# Patient Record
Sex: MALE | Race: ASIAN | NOT HISPANIC OR LATINO | URBAN - METROPOLITAN AREA
[De-identification: names, ages, dates, MRNs, and addresses within clinical notes are randomized per-mention and may not be internally consistent; named-entity substitution may affect disease eponyms.]

---

## 2017-01-01 ENCOUNTER — EMERGENCY (EMERGENCY)
Facility: HOSPITAL | Age: 62
LOS: 1 days | Discharge: HOME | End: 2017-01-01
Attending: EMERGENCY MEDICINE | Admitting: EMERGENCY MEDICINE
Payer: COMMERCIAL

## 2017-01-01 ENCOUNTER — INPATIENT (INPATIENT)
Facility: HOSPITAL | Age: 62
LOS: 1 days | End: 2017-10-28
Attending: SURGERY | Admitting: SURGERY
Payer: COMMERCIAL

## 2017-01-01 ENCOUNTER — INPATIENT (INPATIENT)
Facility: HOSPITAL | Age: 62
LOS: 7 days | Discharge: ANOTHER HOSPITAL | DRG: 305 | End: 2017-10-26
Attending: INTERNAL MEDICINE | Admitting: INTERNAL MEDICINE
Payer: COMMERCIAL

## 2017-01-01 VITALS
DIASTOLIC BLOOD PRESSURE: 81 MMHG | HEART RATE: 68 BPM | RESPIRATION RATE: 16 BRPM | SYSTOLIC BLOOD PRESSURE: 126 MMHG | TEMPERATURE: 98 F | OXYGEN SATURATION: 99 %

## 2017-01-01 VITALS
DIASTOLIC BLOOD PRESSURE: 76 MMHG | RESPIRATION RATE: 15 BRPM | SYSTOLIC BLOOD PRESSURE: 114 MMHG | HEART RATE: 83 BPM | TEMPERATURE: 99 F | OXYGEN SATURATION: 99 %

## 2017-01-01 VITALS
DIASTOLIC BLOOD PRESSURE: 88 MMHG | HEART RATE: 72 BPM | WEIGHT: 102.96 LBS | RESPIRATION RATE: 16 BRPM | HEIGHT: 74 IN | SYSTOLIC BLOOD PRESSURE: 133 MMHG | OXYGEN SATURATION: 100 % | TEMPERATURE: 94 F

## 2017-01-01 VITALS
WEIGHT: 102.07 LBS | RESPIRATION RATE: 20 BRPM | TEMPERATURE: 97 F | HEIGHT: 72 IN | OXYGEN SATURATION: 100 % | DIASTOLIC BLOOD PRESSURE: 76 MMHG | SYSTOLIC BLOOD PRESSURE: 112 MMHG | HEART RATE: 101 BPM

## 2017-01-01 VITALS — DIASTOLIC BLOOD PRESSURE: 95 MMHG | SYSTOLIC BLOOD PRESSURE: 139 MMHG | TEMPERATURE: 98 F | RESPIRATION RATE: 19 BRPM

## 2017-01-01 VITALS — OXYGEN SATURATION: 59 % | RESPIRATION RATE: 8 BRPM

## 2017-01-01 DIAGNOSIS — Z29.9 ENCOUNTER FOR PROPHYLACTIC MEASURES, UNSPECIFIED: ICD-10-CM

## 2017-01-01 DIAGNOSIS — R64 CACHEXIA: ICD-10-CM

## 2017-01-01 DIAGNOSIS — E87.1 HYPO-OSMOLALITY AND HYPONATREMIA: ICD-10-CM

## 2017-01-01 DIAGNOSIS — R62.7 ADULT FAILURE TO THRIVE: ICD-10-CM

## 2017-01-01 DIAGNOSIS — J93.9 PNEUMOTHORAX, UNSPECIFIED: ICD-10-CM

## 2017-01-01 DIAGNOSIS — E83.39 OTHER DISORDERS OF PHOSPHORUS METABOLISM: ICD-10-CM

## 2017-01-01 DIAGNOSIS — R63.0 ANOREXIA: ICD-10-CM

## 2017-01-01 DIAGNOSIS — K52.9 NONINFECTIVE GASTROENTERITIS AND COLITIS, UNSPECIFIED: ICD-10-CM

## 2017-01-01 DIAGNOSIS — E87.6 HYPOKALEMIA: ICD-10-CM

## 2017-01-01 DIAGNOSIS — E87.8 OTHER DISORDERS OF ELECTROLYTE AND FLUID BALANCE, NOT ELSEWHERE CLASSIFIED: ICD-10-CM

## 2017-01-01 DIAGNOSIS — E43 UNSPECIFIED SEVERE PROTEIN-CALORIE MALNUTRITION: ICD-10-CM

## 2017-01-01 DIAGNOSIS — D72.829 ELEVATED WHITE BLOOD CELL COUNT, UNSPECIFIED: ICD-10-CM

## 2017-01-01 DIAGNOSIS — E46 UNSPECIFIED PROTEIN-CALORIE MALNUTRITION: ICD-10-CM

## 2017-01-01 DIAGNOSIS — I73.9 PERIPHERAL VASCULAR DISEASE, UNSPECIFIED: ICD-10-CM

## 2017-01-01 DIAGNOSIS — E16.2 HYPOGLYCEMIA, UNSPECIFIED: ICD-10-CM

## 2017-01-01 LAB
% ALBUMIN: 53.8 % — SIGNIFICANT CHANGE UP
% ALPHA 1: 7.7 % — SIGNIFICANT CHANGE UP
% ALPHA 2: 13.4 % — SIGNIFICANT CHANGE UP
% BETA: 11.1 % — SIGNIFICANT CHANGE UP
% GAMMA: 14 % — SIGNIFICANT CHANGE UP
24R-OH-CALCIDIOL SERPL-MCNC: 26.4 NG/ML — LOW (ref 30–80)
4/8 RATIO: 3.68 RATIO — SIGNIFICANT CHANGE UP (ref 0.9–3.6)
ABS CD8: 10 /UL — LOW (ref 136–757)
ACANTHOCYTES BLD QL SMEAR: SIGNIFICANT CHANGE UP
ACE SERPL-CCNC: 23 U/L — SIGNIFICANT CHANGE UP (ref 14–82)
ACETONE SERPL-MCNC: NEGATIVE — SIGNIFICANT CHANGE UP
ACTH SER-ACNC: 13 PG/ML — SIGNIFICANT CHANGE UP (ref 0–46)
ACTH SER-ACNC: 83 PG/ML — HIGH (ref 0–46)
ALBUMIN SERPL ELPH-MCNC: 1.9 G/DL — LOW (ref 3.3–5)
ALBUMIN SERPL ELPH-MCNC: 2 G/DL — LOW (ref 3.3–5)
ALBUMIN SERPL ELPH-MCNC: 2 G/DL — LOW (ref 3.3–5)
ALBUMIN SERPL ELPH-MCNC: 2.2 G/DL — LOW (ref 3.3–5)
ALBUMIN SERPL ELPH-MCNC: 2.7 G/DL — LOW (ref 3.6–5.5)
ALBUMIN SERPL ELPH-MCNC: 2.9 G/DL — LOW (ref 3.5–5)
ALBUMIN SERPL ELPH-MCNC: 2.9 G/DL — LOW (ref 3.5–5)
ALBUMIN SERPL ELPH-MCNC: 3 G/DL — LOW (ref 3.5–5)
ALBUMIN SERPL ELPH-MCNC: 4 G/DL — SIGNIFICANT CHANGE UP (ref 3.3–5)
ALBUMIN/GLOB SERPL ELPH: 1.2 RATIO — SIGNIFICANT CHANGE UP
ALP SERPL-CCNC: 57 U/L — SIGNIFICANT CHANGE UP (ref 40–120)
ALP SERPL-CCNC: 66 U/L — SIGNIFICANT CHANGE UP (ref 40–120)
ALP SERPL-CCNC: 71 U/L — SIGNIFICANT CHANGE UP (ref 40–120)
ALP SERPL-CCNC: 72 U/L — SIGNIFICANT CHANGE UP (ref 40–120)
ALP SERPL-CCNC: 83 U/L — SIGNIFICANT CHANGE UP (ref 40–120)
ALP SERPL-CCNC: 85 U/L — SIGNIFICANT CHANGE UP (ref 40–120)
ALP SERPL-CCNC: 89 U/L — SIGNIFICANT CHANGE UP (ref 40–120)
ALPHA1 GLOB SERPL ELPH-MCNC: 0.4 G/DL — SIGNIFICANT CHANGE UP (ref 0.1–0.4)
ALPHA2 GLOB SERPL ELPH-MCNC: 0.7 G/DL — SIGNIFICANT CHANGE UP (ref 0.5–1)
ALT FLD-CCNC: 1598 U/L — HIGH (ref 4–41)
ALT FLD-CCNC: 1832 U/L — HIGH (ref 4–41)
ALT FLD-CCNC: 25 U/L RC — SIGNIFICANT CHANGE UP (ref 10–45)
ALT FLD-CCNC: 67 U/L DA — HIGH (ref 10–60)
ALT FLD-CCNC: 687 U/L — HIGH (ref 4–41)
ALT FLD-CCNC: 70 U/L DA — HIGH (ref 10–60)
ALT FLD-CCNC: 72 U/L DA — HIGH (ref 10–60)
AMYLASE P1 CFR SERPL: 117 U/L — HIGH (ref 25–115)
ANA TITR SER: NEGATIVE — SIGNIFICANT CHANGE UP
ANION GAP SERPL CALC-SCNC: 10 MMOL/L — SIGNIFICANT CHANGE UP (ref 5–17)
ANION GAP SERPL CALC-SCNC: 12 MMOL/L — SIGNIFICANT CHANGE UP (ref 5–17)
ANION GAP SERPL CALC-SCNC: 3 MMOL/L — LOW (ref 5–17)
ANION GAP SERPL CALC-SCNC: 4 MMOL/L — LOW (ref 5–17)
ANION GAP SERPL CALC-SCNC: 5 MMOL/L — SIGNIFICANT CHANGE UP (ref 5–17)
ANION GAP SERPL CALC-SCNC: 5 MMOL/L — SIGNIFICANT CHANGE UP (ref 5–17)
ANION GAP SERPL CALC-SCNC: 6 MMOL/L — SIGNIFICANT CHANGE UP (ref 5–17)
ANION GAP SERPL CALC-SCNC: 7 MMOL/L — SIGNIFICANT CHANGE UP (ref 5–17)
ANION GAP SERPL CALC-SCNC: 7 MMOL/L — SIGNIFICANT CHANGE UP (ref 5–17)
ANION GAP SERPL CALC-SCNC: 8 MMOL/L — SIGNIFICANT CHANGE UP (ref 5–17)
ANISOCYTOSIS BLD QL: SLIGHT — SIGNIFICANT CHANGE UP
ANISOCYTOSIS BLD QL: SLIGHT — SIGNIFICANT CHANGE UP
APPEARANCE UR: CLEAR — SIGNIFICANT CHANGE UP
APTT BLD: 32.6 SEC — SIGNIFICANT CHANGE UP (ref 27.5–37.4)
APTT BLD: 38.8 SEC — HIGH (ref 27.5–37.4)
APTT BLD: 42.7 SEC — HIGH (ref 27.5–37.4)
APTT BLD: 46.3 SEC — HIGH (ref 27.5–37.4)
APTT BLD: 50.2 SEC — HIGH (ref 27.5–37.4)
AST SERPL-CCNC: 1120 U/L — HIGH (ref 4–40)
AST SERPL-CCNC: 27 U/L — SIGNIFICANT CHANGE UP (ref 10–40)
AST SERPL-CCNC: 3611 U/L — HIGH (ref 4–40)
AST SERPL-CCNC: 3668 U/L — HIGH (ref 4–40)
AST SERPL-CCNC: 75 U/L — HIGH (ref 10–40)
AST SERPL-CCNC: 78 U/L — HIGH (ref 10–40)
AST SERPL-CCNC: 79 U/L — HIGH (ref 10–40)
B-GLOBULIN SERPL ELPH-MCNC: 0.6 G/DL — SIGNIFICANT CHANGE UP (ref 0.5–1)
BASE EXCESS BLDA CALC-SCNC: -3.6 MMOL/L — SIGNIFICANT CHANGE UP
BASE EXCESS BLDA CALC-SCNC: -6.3 MMOL/L — SIGNIFICANT CHANGE UP
BASE EXCESS BLDA CALC-SCNC: -7.2 MMOL/L — SIGNIFICANT CHANGE UP
BASE EXCESS BLDA CALC-SCNC: -7.5 MMOL/L — SIGNIFICANT CHANGE UP
BASE EXCESS BLDA CALC-SCNC: -9.6 MMOL/L — SIGNIFICANT CHANGE UP
BASE EXCESS BLDV CALC-SCNC: -0.5 MMOL/L — SIGNIFICANT CHANGE UP
BASE EXCESS BLDV CALC-SCNC: 4.1 MMOL/L — HIGH (ref -2–2)
BASOPHILS # BLD AUTO: 0 K/UL — SIGNIFICANT CHANGE UP (ref 0–0.2)
BASOPHILS # BLD AUTO: 0.02 K/UL — SIGNIFICANT CHANGE UP (ref 0–0.2)
BASOPHILS # BLD AUTO: 0.02 K/UL — SIGNIFICANT CHANGE UP (ref 0–0.2)
BASOPHILS # BLD AUTO: 0.1 K/UL — SIGNIFICANT CHANGE UP (ref 0–0.2)
BASOPHILS # BLD AUTO: 0.1 K/UL — SIGNIFICANT CHANGE UP (ref 0–0.2)
BASOPHILS # BLD AUTO: 0.2 K/UL — SIGNIFICANT CHANGE UP (ref 0–0.2)
BASOPHILS NFR BLD AUTO: 0.2 % — SIGNIFICANT CHANGE UP (ref 0–2)
BASOPHILS NFR BLD AUTO: 0.2 % — SIGNIFICANT CHANGE UP (ref 0–2)
BASOPHILS NFR BLD AUTO: 0.3 % — SIGNIFICANT CHANGE UP (ref 0–2)
BASOPHILS NFR BLD AUTO: 0.4 % — SIGNIFICANT CHANGE UP (ref 0–2)
BASOPHILS NFR BLD AUTO: 0.5 % — SIGNIFICANT CHANGE UP (ref 0–2)
BASOPHILS NFR BLD AUTO: 0.5 % — SIGNIFICANT CHANGE UP (ref 0–2)
BASOPHILS NFR BLD AUTO: 0.6 % — SIGNIFICANT CHANGE UP (ref 0–2)
BASOPHILS NFR BLD AUTO: 1.5 % — SIGNIFICANT CHANGE UP (ref 0–2)
BASOPHILS NFR SPEC: 0 % — SIGNIFICANT CHANGE UP (ref 0–2)
BILIRUB DIRECT SERPL-MCNC: 0.3 MG/DL — HIGH (ref 0.1–0.2)
BILIRUB DIRECT SERPL-MCNC: 0.4 MG/DL — HIGH (ref 0.1–0.2)
BILIRUB SERPL-MCNC: 0.5 MG/DL — SIGNIFICANT CHANGE UP (ref 0.2–1.2)
BILIRUB SERPL-MCNC: 0.7 MG/DL — SIGNIFICANT CHANGE UP (ref 0.2–1.2)
BILIRUB SERPL-MCNC: 0.8 MG/DL — SIGNIFICANT CHANGE UP (ref 0.2–1.2)
BILIRUB SERPL-MCNC: 0.8 MG/DL — SIGNIFICANT CHANGE UP (ref 0.2–1.2)
BILIRUB SERPL-MCNC: 0.9 MG/DL — SIGNIFICANT CHANGE UP (ref 0.2–1.2)
BILIRUB SERPL-MCNC: 0.9 MG/DL — SIGNIFICANT CHANGE UP (ref 0.2–1.2)
BILIRUB SERPL-MCNC: 1 MG/DL — SIGNIFICANT CHANGE UP (ref 0.2–1.2)
BILIRUB UR-MCNC: NEGATIVE — SIGNIFICANT CHANGE UP
BLD GP AB SCN SERPL QL: NEGATIVE — SIGNIFICANT CHANGE UP
BLD GP AB SCN SERPL QL: NEGATIVE — SIGNIFICANT CHANGE UP
BLOOD GAS VENOUS - CREATININE: 0.38 MG/DL — LOW (ref 0.5–1.3)
BLOOD UR QL VISUAL: NEGATIVE — SIGNIFICANT CHANGE UP
BUN SERPL-MCNC: 10 MG/DL — SIGNIFICANT CHANGE UP (ref 7–18)
BUN SERPL-MCNC: 10 MG/DL — SIGNIFICANT CHANGE UP (ref 7–18)
BUN SERPL-MCNC: 11 MG/DL — SIGNIFICANT CHANGE UP (ref 7–18)
BUN SERPL-MCNC: 11 MG/DL — SIGNIFICANT CHANGE UP (ref 7–18)
BUN SERPL-MCNC: 13 MG/DL — SIGNIFICANT CHANGE UP (ref 7–18)
BUN SERPL-MCNC: 15 MG/DL — SIGNIFICANT CHANGE UP (ref 7–18)
BUN SERPL-MCNC: 17 MG/DL — SIGNIFICANT CHANGE UP (ref 7–23)
BUN SERPL-MCNC: 19 MG/DL — HIGH (ref 7–18)
BUN SERPL-MCNC: 19 MG/DL — HIGH (ref 7–18)
BUN SERPL-MCNC: 20 MG/DL — HIGH (ref 7–18)
BUN SERPL-MCNC: 20 MG/DL — HIGH (ref 7–18)
BUN SERPL-MCNC: 32 MG/DL — HIGH (ref 7–18)
BUN SERPL-MCNC: 42 MG/DL — HIGH (ref 7–23)
BUN SERPL-MCNC: 43 MG/DL — HIGH (ref 7–23)
BUN SERPL-MCNC: 44 MG/DL — HIGH (ref 7–23)
BUN SERPL-MCNC: 7 MG/DL — SIGNIFICANT CHANGE UP (ref 7–18)
BUN SERPL-MCNC: 8 MG/DL — SIGNIFICANT CHANGE UP (ref 7–18)
BUN SERPL-MCNC: 9 MG/DL — SIGNIFICANT CHANGE UP (ref 7–18)
C DIFF BY PCR RESULT: SIGNIFICANT CHANGE UP
C DIFF TOX GENS STL QL NAA+PROBE: SIGNIFICANT CHANGE UP
C PEPTIDE SERPL-MCNC: 0.6 NG/ML — LOW (ref 0.9–7.1)
C PEPTIDE SERPL-MCNC: 1 NG/ML — SIGNIFICANT CHANGE UP (ref 0.9–7.1)
CA-I BLDA-SCNC: 0.67 MMOL/L — CRITICAL LOW (ref 1.15–1.29)
CA-I BLDA-SCNC: 0.95 MMOL/L — LOW (ref 1.15–1.29)
CA-I BLDA-SCNC: 1.13 MMOL/L — LOW (ref 1.15–1.29)
CA-I BLDA-SCNC: 1.19 MMOL/L — SIGNIFICANT CHANGE UP (ref 1.15–1.29)
CA-I BLDA-SCNC: 1.2 MMOL/L — SIGNIFICANT CHANGE UP (ref 1.15–1.29)
CA-I SERPL-SCNC: 1.26 MMOL/L — SIGNIFICANT CHANGE UP (ref 1.12–1.3)
CALCIUM SERPL-MCNC: 7.1 MG/DL — LOW (ref 8.4–10.5)
CALCIUM SERPL-MCNC: 7.2 MG/DL — LOW (ref 8.4–10.5)
CALCIUM SERPL-MCNC: 7.3 MG/DL — LOW (ref 8.4–10.5)
CALCIUM SERPL-MCNC: 7.4 MG/DL — LOW (ref 8.4–10.5)
CALCIUM SERPL-MCNC: 7.5 MG/DL — LOW (ref 8.4–10.5)
CALCIUM SERPL-MCNC: 7.6 MG/DL — LOW (ref 8.4–10.5)
CALCIUM SERPL-MCNC: 7.7 MG/DL — LOW (ref 8.4–10.5)
CALCIUM SERPL-MCNC: 7.7 MG/DL — LOW (ref 8.4–10.5)
CALCIUM SERPL-MCNC: 7.8 MG/DL — LOW (ref 8.4–10.5)
CALCIUM SERPL-MCNC: 7.9 MG/DL — LOW (ref 8.4–10.5)
CALCIUM SERPL-MCNC: 8 MG/DL — LOW (ref 8.4–10.5)
CALCIUM SERPL-MCNC: 8 MG/DL — LOW (ref 8.4–10.5)
CALCIUM SERPL-MCNC: 8.2 MG/DL — LOW (ref 8.4–10.5)
CALCIUM SERPL-MCNC: 8.4 MG/DL — SIGNIFICANT CHANGE UP (ref 8.4–10.5)
CALCIUM SERPL-MCNC: 9.5 MG/DL — SIGNIFICANT CHANGE UP (ref 8.4–10.5)
CD3 BLASTS SPEC-ACNC: 31 % — LOW (ref 59–85)
CD3 BLASTS SPEC-ACNC: 45 /UL — LOW (ref 799–2171)
CD4 %: 24 % — LOW (ref 36–65)
CD8 %: 7 % — LOW (ref 11–36)
CHLORIDE BLDV-SCNC: 95 MMOL/L — LOW (ref 96–108)
CHLORIDE BLDV-SCNC: 97 MMOL/L — SIGNIFICANT CHANGE UP (ref 96–108)
CHLORIDE SERPL-SCNC: 101 MMOL/L — SIGNIFICANT CHANGE UP (ref 96–108)
CHLORIDE SERPL-SCNC: 81 MMOL/L — LOW (ref 96–108)
CHLORIDE SERPL-SCNC: 94 MMOL/L — LOW (ref 96–108)
CHLORIDE SERPL-SCNC: 94 MMOL/L — LOW (ref 96–108)
CHLORIDE SERPL-SCNC: 94 MMOL/L — LOW (ref 98–107)
CHLORIDE SERPL-SCNC: 95 MMOL/L — LOW (ref 96–108)
CHLORIDE SERPL-SCNC: 95 MMOL/L — LOW (ref 96–108)
CHLORIDE SERPL-SCNC: 96 MMOL/L — LOW (ref 98–107)
CHLORIDE SERPL-SCNC: 96 MMOL/L — SIGNIFICANT CHANGE UP (ref 96–108)
CHLORIDE SERPL-SCNC: 97 MMOL/L — SIGNIFICANT CHANGE UP (ref 96–108)
CHLORIDE SERPL-SCNC: 98 MMOL/L — SIGNIFICANT CHANGE UP (ref 96–108)
CHLORIDE SERPL-SCNC: 98 MMOL/L — SIGNIFICANT CHANGE UP (ref 98–107)
CHLORIDE SERPL-SCNC: 99 MMOL/L — SIGNIFICANT CHANGE UP (ref 96–108)
CHLORIDE SERPL-SCNC: 99 MMOL/L — SIGNIFICANT CHANGE UP (ref 96–108)
CHLORIDE UR-SCNC: 56 MMOL/L — SIGNIFICANT CHANGE UP (ref 55–125)
CHLORIDE UR-SCNC: 91 MMOL/L — SIGNIFICANT CHANGE UP (ref 55–125)
CHOLEST SERPL-MCNC: 83 MG/DL — SIGNIFICANT CHANGE UP (ref 10–199)
CK MB BLD-MCNC: 6.87 NG/ML — HIGH (ref 1–6.6)
CK SERPL-CCNC: 135 U/L — SIGNIFICANT CHANGE UP (ref 30–200)
CO2 BLDV-SCNC: 33 MMOL/L — HIGH (ref 22–30)
CO2 SERPL-SCNC: 15 MMOL/L — LOW (ref 22–31)
CO2 SERPL-SCNC: 18 MMOL/L — LOW (ref 22–31)
CO2 SERPL-SCNC: 25 MMOL/L — SIGNIFICANT CHANGE UP (ref 22–31)
CO2 SERPL-SCNC: 25 MMOL/L — SIGNIFICANT CHANGE UP (ref 22–31)
CO2 SERPL-SCNC: 27 MMOL/L — SIGNIFICANT CHANGE UP (ref 22–31)
CO2 SERPL-SCNC: 27 MMOL/L — SIGNIFICANT CHANGE UP (ref 22–31)
CO2 SERPL-SCNC: 29 MMOL/L — SIGNIFICANT CHANGE UP (ref 22–31)
CO2 SERPL-SCNC: 29 MMOL/L — SIGNIFICANT CHANGE UP (ref 22–31)
CO2 SERPL-SCNC: 30 MMOL/L — SIGNIFICANT CHANGE UP (ref 22–31)
CO2 SERPL-SCNC: 31 MMOL/L — SIGNIFICANT CHANGE UP (ref 22–31)
CO2 SERPL-SCNC: 32 MMOL/L — HIGH (ref 22–31)
CO2 SERPL-SCNC: 33 MMOL/L — HIGH (ref 22–31)
CO2 SERPL-SCNC: 33 MMOL/L — HIGH (ref 22–31)
CO2 SERPL-SCNC: 34 MMOL/L — HIGH (ref 22–31)
CO2 SERPL-SCNC: 34 MMOL/L — HIGH (ref 22–31)
COD CRY URNS QL: SIGNIFICANT CHANGE UP (ref 0–0)
COLOR SPEC: YELLOW — SIGNIFICANT CHANGE UP
CORTIS AM PEAK SERPL-MCNC: 24.4 UG/DL — HIGH (ref 6–18.4)
CORTIS AM PEAK SERPL-MCNC: 24.7 UG/DL — HIGH (ref 6–18.4)
CORTIS AM PEAK SERPL-MCNC: 25.4 UG/DL — HIGH (ref 6–18.4)
CORTIS AM PEAK SERPL-MCNC: 62.9 UG/DL — HIGH (ref 6–18.4)
CREAT ?TM UR-MCNC: <13 MG/DL — SIGNIFICANT CHANGE UP
CREAT ?TM UR-MCNC: <13 MG/DL — SIGNIFICANT CHANGE UP
CREAT SERPL-MCNC: 0.23 MG/DL — LOW (ref 0.5–1.3)
CREAT SERPL-MCNC: 0.23 MG/DL — LOW (ref 0.5–1.3)
CREAT SERPL-MCNC: 0.25 MG/DL — LOW (ref 0.5–1.3)
CREAT SERPL-MCNC: 0.26 MG/DL — LOW (ref 0.5–1.3)
CREAT SERPL-MCNC: 0.27 MG/DL — LOW (ref 0.5–1.3)
CREAT SERPL-MCNC: 0.29 MG/DL — LOW (ref 0.5–1.3)
CREAT SERPL-MCNC: 0.29 MG/DL — LOW (ref 0.5–1.3)
CREAT SERPL-MCNC: 0.3 MG/DL — LOW (ref 0.5–1.3)
CREAT SERPL-MCNC: 0.33 MG/DL — LOW (ref 0.5–1.3)
CREAT SERPL-MCNC: 0.37 MG/DL — LOW (ref 0.5–1.3)
CREAT SERPL-MCNC: 0.39 MG/DL — LOW (ref 0.5–1.3)
CREAT SERPL-MCNC: 0.4 MG/DL — LOW (ref 0.5–1.3)
CREAT SERPL-MCNC: 0.42 MG/DL — LOW (ref 0.5–1.3)
CREAT SERPL-MCNC: 0.48 MG/DL — LOW (ref 0.5–1.3)
CREAT SERPL-MCNC: 0.55 MG/DL — SIGNIFICANT CHANGE UP (ref 0.5–1.3)
CREAT SERPL-MCNC: 0.8 MG/DL — SIGNIFICANT CHANGE UP (ref 0.5–1.3)
CREAT SERPL-MCNC: <0.2 MG/DL — LOW (ref 0.5–1.3)
CREAT SERPL-MCNC: <0.2 MG/DL — LOW (ref 0.5–1.3)
CRP SERPL-MCNC: <0.2 MG/DL — SIGNIFICANT CHANGE UP (ref 0–0.4)
CULTURE - ACID FAST SMEAR CONCENTRATED: SIGNIFICANT CHANGE UP
CULTURE RESULTS: SIGNIFICANT CHANGE UP
D DIMER BLD IA.RAPID-MCNC: 866 NG/ML DDU — HIGH
DIFF PNL FLD: ABNORMAL
DIFF PNL FLD: NEGATIVE — SIGNIFICANT CHANGE UP
ELLIPTOCYTES BLD QL SMEAR: SLIGHT — SIGNIFICANT CHANGE UP
EOSINOPHIL # BLD AUTO: 0 K/UL — SIGNIFICANT CHANGE UP (ref 0–0.5)
EOSINOPHIL NFR BLD AUTO: 0 % — SIGNIFICANT CHANGE UP (ref 0–6)
EOSINOPHIL NFR BLD AUTO: 0.1 % — SIGNIFICANT CHANGE UP (ref 0–6)
EOSINOPHIL NFR BLD AUTO: 0.1 % — SIGNIFICANT CHANGE UP (ref 0–6)
EOSINOPHIL NFR BLD AUTO: 0.2 % — SIGNIFICANT CHANGE UP (ref 0–6)
EOSINOPHIL NFR FLD: 0 % — SIGNIFICANT CHANGE UP (ref 0–6)
ERYTHROCYTE [SEDIMENTATION RATE] IN BLOOD: 6 MM/HR — SIGNIFICANT CHANGE UP (ref 0–20)
FERRITIN SERPL-MCNC: 2385 NG/ML — HIGH (ref 30–400)
FIBRINOGEN PPP-MCNC: 866 MG/DL — HIGH (ref 310–510)
FOLATE SERPL-MCNC: 12.3 NG/ML — SIGNIFICANT CHANGE UP (ref 4.8–24.2)
FOLATE SERPL-MCNC: 15.2 NG/ML — SIGNIFICANT CHANGE UP (ref 4.8–24.2)
FOLATE SERPL-MCNC: > 20 NG/ML — HIGH (ref 4.7–20)
GAMMA GLOBULIN: 0.7 G/DL — SIGNIFICANT CHANGE UP (ref 0.6–1.6)
GAS PNL BLDV: 125 MMOL/L — LOW (ref 136–146)
GAS PNL BLDV: 135 MMOL/L — LOW (ref 136–145)
GAS PNL BLDV: SIGNIFICANT CHANGE UP
GAS PNL BLDV: SIGNIFICANT CHANGE UP
GIANT PLATELETS BLD QL SMEAR: PRESENT — SIGNIFICANT CHANGE UP
GLUCOSE BLDA-MCNC: 104 MG/DL — HIGH (ref 70–99)
GLUCOSE BLDA-MCNC: 108 MG/DL — HIGH (ref 70–99)
GLUCOSE BLDA-MCNC: 126 MG/DL — HIGH (ref 70–99)
GLUCOSE BLDA-MCNC: 60 MG/DL — LOW (ref 70–99)
GLUCOSE BLDA-MCNC: 80 MG/DL — SIGNIFICANT CHANGE UP (ref 70–99)
GLUCOSE BLDC GLUCOMTR-MCNC: 102 MG/DL — HIGH (ref 70–99)
GLUCOSE BLDC GLUCOMTR-MCNC: 104 MG/DL — HIGH (ref 70–99)
GLUCOSE BLDC GLUCOMTR-MCNC: 106 MG/DL — HIGH (ref 70–99)
GLUCOSE BLDC GLUCOMTR-MCNC: 114 MG/DL — HIGH (ref 70–99)
GLUCOSE BLDC GLUCOMTR-MCNC: 118 MG/DL — HIGH (ref 70–99)
GLUCOSE BLDC GLUCOMTR-MCNC: 123 MG/DL — HIGH (ref 70–99)
GLUCOSE BLDC GLUCOMTR-MCNC: 125 MG/DL — HIGH (ref 70–99)
GLUCOSE BLDC GLUCOMTR-MCNC: 126 MG/DL — HIGH (ref 70–99)
GLUCOSE BLDC GLUCOMTR-MCNC: 129 MG/DL — HIGH (ref 70–99)
GLUCOSE BLDC GLUCOMTR-MCNC: 130 MG/DL — HIGH (ref 70–99)
GLUCOSE BLDC GLUCOMTR-MCNC: 135 MG/DL — HIGH (ref 70–99)
GLUCOSE BLDC GLUCOMTR-MCNC: 148 MG/DL — HIGH (ref 70–99)
GLUCOSE BLDC GLUCOMTR-MCNC: 168 MG/DL — HIGH (ref 70–99)
GLUCOSE BLDC GLUCOMTR-MCNC: 193 MG/DL — HIGH (ref 70–99)
GLUCOSE BLDC GLUCOMTR-MCNC: 202 MG/DL — HIGH (ref 70–99)
GLUCOSE BLDC GLUCOMTR-MCNC: 207 MG/DL — HIGH (ref 70–99)
GLUCOSE BLDC GLUCOMTR-MCNC: 233 MG/DL — HIGH (ref 70–99)
GLUCOSE BLDC GLUCOMTR-MCNC: 332 MG/DL — HIGH (ref 70–99)
GLUCOSE BLDC GLUCOMTR-MCNC: 342 MG/DL — HIGH (ref 70–99)
GLUCOSE BLDC GLUCOMTR-MCNC: 42 MG/DL — CRITICAL LOW (ref 70–99)
GLUCOSE BLDC GLUCOMTR-MCNC: 45 MG/DL — CRITICAL LOW (ref 70–99)
GLUCOSE BLDC GLUCOMTR-MCNC: 56 MG/DL — LOW (ref 70–99)
GLUCOSE BLDC GLUCOMTR-MCNC: 58 MG/DL — LOW (ref 70–99)
GLUCOSE BLDC GLUCOMTR-MCNC: 62 MG/DL — LOW (ref 70–99)
GLUCOSE BLDC GLUCOMTR-MCNC: 73 MG/DL — SIGNIFICANT CHANGE UP (ref 70–99)
GLUCOSE BLDC GLUCOMTR-MCNC: 79 MG/DL — SIGNIFICANT CHANGE UP (ref 70–99)
GLUCOSE BLDC GLUCOMTR-MCNC: 81 MG/DL — SIGNIFICANT CHANGE UP (ref 70–99)
GLUCOSE BLDC GLUCOMTR-MCNC: 85 MG/DL — SIGNIFICANT CHANGE UP (ref 70–99)
GLUCOSE BLDC GLUCOMTR-MCNC: 86 MG/DL — SIGNIFICANT CHANGE UP (ref 70–99)
GLUCOSE BLDC GLUCOMTR-MCNC: 99 MG/DL — SIGNIFICANT CHANGE UP (ref 70–99)
GLUCOSE BLDC GLUCOMTR-MCNC: >600 MG/DL — CRITICAL HIGH (ref 70–99)
GLUCOSE BLDV-MCNC: 132 — HIGH (ref 70–99)
GLUCOSE BLDV-MCNC: 83 MG/DL — SIGNIFICANT CHANGE UP (ref 70–99)
GLUCOSE SERPL-MCNC: 108 MG/DL — HIGH (ref 70–99)
GLUCOSE SERPL-MCNC: 110 MG/DL — HIGH (ref 70–99)
GLUCOSE SERPL-MCNC: 139 MG/DL — HIGH (ref 70–99)
GLUCOSE SERPL-MCNC: 151 MG/DL — HIGH (ref 70–99)
GLUCOSE SERPL-MCNC: 166 MG/DL — HIGH (ref 70–99)
GLUCOSE SERPL-MCNC: 23 MG/DL — CRITICAL LOW (ref 70–99)
GLUCOSE SERPL-MCNC: 53 MG/DL — LOW (ref 70–99)
GLUCOSE SERPL-MCNC: 54 MG/DL — LOW (ref 70–99)
GLUCOSE SERPL-MCNC: 54 MG/DL — LOW (ref 70–99)
GLUCOSE SERPL-MCNC: 61 MG/DL — LOW (ref 70–99)
GLUCOSE SERPL-MCNC: 62 MG/DL — LOW (ref 70–99)
GLUCOSE SERPL-MCNC: 78 MG/DL — SIGNIFICANT CHANGE UP (ref 70–99)
GLUCOSE SERPL-MCNC: 82 MG/DL — SIGNIFICANT CHANGE UP (ref 70–99)
GLUCOSE SERPL-MCNC: 83 MG/DL — SIGNIFICANT CHANGE UP (ref 70–99)
GLUCOSE SERPL-MCNC: 84 MG/DL — SIGNIFICANT CHANGE UP (ref 70–99)
GLUCOSE SERPL-MCNC: 85 MG/DL — SIGNIFICANT CHANGE UP (ref 70–99)
GLUCOSE SERPL-MCNC: 87 MG/DL — SIGNIFICANT CHANGE UP (ref 70–99)
GLUCOSE SERPL-MCNC: 90 MG/DL — SIGNIFICANT CHANGE UP (ref 70–99)
GLUCOSE SERPL-MCNC: 93 MG/DL — SIGNIFICANT CHANGE UP (ref 70–99)
GLUCOSE SERPL-MCNC: 95 MG/DL — SIGNIFICANT CHANGE UP (ref 70–99)
GLUCOSE UR QL: NEGATIVE — SIGNIFICANT CHANGE UP
GLUCOSE UR QL: NEGATIVE — SIGNIFICANT CHANGE UP
GLUCOSE UR-MCNC: 250 — SIGNIFICANT CHANGE UP
GRAM STN WND: SIGNIFICANT CHANGE UP
H PYLORI AB SER-ACNC: 12.9 UNITS — SIGNIFICANT CHANGE UP
H PYLORI AG STL QL: NEGATIVE — SIGNIFICANT CHANGE UP
HBA1C BLD-MCNC: 5.7 % — HIGH (ref 4–5.6)
HBA1C BLD-MCNC: 5.8 % — HIGH (ref 4–5.6)
HCO3 BLDA-SCNC: 17 MMOL/L — LOW (ref 22–26)
HCO3 BLDA-SCNC: 18 MMOL/L — LOW (ref 22–26)
HCO3 BLDA-SCNC: 20 MMOL/L — LOW (ref 22–26)
HCO3 BLDV-SCNC: 24 MMOL/L — SIGNIFICANT CHANGE UP (ref 20–27)
HCO3 BLDV-SCNC: 31 MMOL/L — HIGH (ref 21–29)
HCT VFR BLD CALC: 21.1 % — LOW (ref 39–50)
HCT VFR BLD CALC: 22.2 % — LOW (ref 39–50)
HCT VFR BLD CALC: 25.8 % — LOW (ref 39–50)
HCT VFR BLD CALC: 34 % — LOW (ref 39–50)
HCT VFR BLD CALC: 34.1 % — LOW (ref 39–50)
HCT VFR BLD CALC: 35.3 % — LOW (ref 39–50)
HCT VFR BLD CALC: 36.4 % — LOW (ref 39–50)
HCT VFR BLD CALC: 36.5 % — LOW (ref 39–50)
HCT VFR BLD CALC: 38.4 % — LOW (ref 39–50)
HCT VFR BLD CALC: 38.5 % — LOW (ref 39–50)
HCT VFR BLD CALC: 38.5 % — LOW (ref 39–50)
HCT VFR BLD CALC: 42.6 % — SIGNIFICANT CHANGE UP (ref 39–50)
HCT VFR BLDA CALC: 23.3 % — LOW (ref 39–51)
HCT VFR BLDA CALC: 27.3 % — LOW (ref 39–51)
HCT VFR BLDA CALC: 29.2 % — LOW (ref 39–51)
HCT VFR BLDA CALC: 34.4 % — LOW (ref 39–51)
HCT VFR BLDA CALC: 36.5 % — LOW (ref 39–51)
HCT VFR BLDA CALC: 43 % — SIGNIFICANT CHANGE UP (ref 39–50)
HCT VFR BLDV CALC: 26.6 % — LOW (ref 39–51)
HDLC SERPL-MCNC: 75 MG/DL — SIGNIFICANT CHANGE UP (ref 40–125)
HGB BLD CALC-MCNC: 13.9 G/DL — SIGNIFICANT CHANGE UP (ref 13–17)
HGB BLD-MCNC: 11.7 G/DL — LOW (ref 13–17)
HGB BLD-MCNC: 11.8 G/DL — LOW (ref 13–17)
HGB BLD-MCNC: 11.9 G/DL — LOW (ref 13–17)
HGB BLD-MCNC: 12.2 G/DL — LOW (ref 13–17)
HGB BLD-MCNC: 12.9 G/DL — LOW (ref 13–17)
HGB BLD-MCNC: 13 G/DL — SIGNIFICANT CHANGE UP (ref 13–17)
HGB BLD-MCNC: 14 G/DL — SIGNIFICANT CHANGE UP (ref 13–17)
HGB BLD-MCNC: 7 G/DL — CRITICAL LOW (ref 13–17)
HGB BLD-MCNC: 7.7 G/DL — LOW (ref 13–17)
HGB BLD-MCNC: 8.7 G/DL — LOW (ref 13–17)
HGB BLDA-MCNC: 11.2 G/DL — LOW (ref 13–17)
HGB BLDA-MCNC: 11.9 G/DL — LOW (ref 13–17)
HGB BLDA-MCNC: 7.5 G/DL — LOW (ref 13–17)
HGB BLDA-MCNC: 8.8 G/DL — LOW (ref 13–17)
HGB BLDA-MCNC: 9.4 G/DL — LOW (ref 13–17)
HGB BLDV-MCNC: 8.6 G/DL — LOW (ref 13–17)
HIV 1 & 2 AB SERPL IA.RAPID: SIGNIFICANT CHANGE UP
HYALINE CASTS # UR AUTO: SIGNIFICANT CHANGE UP (ref 0–?)
IGA FLD-MCNC: 166 MG/DL — SIGNIFICANT CHANGE UP (ref 68–378)
IGA FLD-MCNC: 177 MG/DL — SIGNIFICANT CHANGE UP (ref 68–378)
IGG FLD-MCNC: 643 MG/DL — LOW (ref 694–1618)
IGG FLD-MCNC: 653 MG/DL — LOW (ref 694–1618)
IGM SERPL-MCNC: 164 MG/DL — SIGNIFICANT CHANGE UP (ref 40–230)
IGM SERPL-MCNC: 170 MG/DL — SIGNIFICANT CHANGE UP (ref 40–230)
IMM GRANULOCYTES # BLD AUTO: 0.03 # — SIGNIFICANT CHANGE UP
IMM GRANULOCYTES # BLD AUTO: 0.04 # — SIGNIFICANT CHANGE UP
IMM GRANULOCYTES NFR BLD AUTO: 0.4 % — SIGNIFICANT CHANGE UP (ref 0–1.5)
IMM GRANULOCYTES NFR BLD AUTO: 0.5 % — SIGNIFICANT CHANGE UP (ref 0–1.5)
INR BLD: 1.37 RATIO — HIGH (ref 0.88–1.16)
INR BLD: 1.42 RATIO — HIGH (ref 0.88–1.16)
INR BLD: 1.52 RATIO — HIGH (ref 0.88–1.16)
INR BLD: 1.75 — HIGH (ref 0.88–1.17)
INR BLD: 2.43 — HIGH (ref 0.88–1.17)
INR BLD: 3.57 — HIGH (ref 0.88–1.17)
INSULIN SERPL-MCNC: 1.8 UU/ML — LOW (ref 3–17)
INTERPRETATION SERPL IFE-IMP: SIGNIFICANT CHANGE UP
IRON SATN MFR SERPL: 175 UG/DL — HIGH (ref 65–170)
IRON SATN MFR SERPL: 88 % — HIGH (ref 20–55)
KAPPA LC SER QL IFE: 0.78 MG/DL — SIGNIFICANT CHANGE UP (ref 0.33–1.94)
KAPPA LC SER QL IFE: 0.98 MG/DL — SIGNIFICANT CHANGE UP (ref 0.33–1.94)
KAPPA/LAMBDA FREE LIGHT CHAIN RATIO, SERUM: 0.63 RATIO — SIGNIFICANT CHANGE UP (ref 0.26–1.65)
KAPPA/LAMBDA FREE LIGHT CHAIN RATIO, SERUM: 0.71 RATIO — SIGNIFICANT CHANGE UP (ref 0.26–1.65)
KETONES UR-MCNC: NEGATIVE — SIGNIFICANT CHANGE UP
LACTATE BLDA-SCNC: 6.4 MMOL/L — CRITICAL HIGH (ref 0.5–2)
LACTATE BLDA-SCNC: 8.3 MMOL/L — CRITICAL HIGH (ref 0.5–2)
LACTATE BLDA-SCNC: 8.6 MMOL/L — CRITICAL HIGH (ref 0.5–2)
LACTATE BLDV-MCNC: 1.4 MMOL/L — SIGNIFICANT CHANGE UP (ref 0.7–2)
LACTATE BLDV-MCNC: 4 MMOL/L — CRITICAL HIGH (ref 0.5–2)
LACTATE SERPL-SCNC: 0.7 MMOL/L — SIGNIFICANT CHANGE UP (ref 0.7–2)
LACTATE SERPL-SCNC: 1.1 MMOL/L — SIGNIFICANT CHANGE UP (ref 0.7–2)
LACTATE SERPL-SCNC: 4.9 MMOL/L — CRITICAL HIGH (ref 0.5–2)
LAMBDA LC SER QL IFE: 1.23 MG/DL — SIGNIFICANT CHANGE UP (ref 0.57–2.63)
LAMBDA LC SER QL IFE: 1.39 MG/DL — SIGNIFICANT CHANGE UP (ref 0.57–2.63)
LDH SERPL L TO P-CCNC: 332 U/L — HIGH (ref 120–225)
LEUKOCYTE ESTERASE UR-ACNC: NEGATIVE — SIGNIFICANT CHANGE UP
LIDOCAIN IGE QN: 377 U/L — SIGNIFICANT CHANGE UP (ref 73–393)
LIDOCAIN IGE QN: 638 U/L — HIGH (ref 73–393)
LIDOCAIN IGE QN: 66 U/L — HIGH (ref 7–60)
LIPID PNL WITH DIRECT LDL SERPL: >5 MG/DL — SIGNIFICANT CHANGE UP
LYMPHOCYTES # BLD AUTO: 0.12 K/UL — LOW (ref 1–3.3)
LYMPHOCYTES # BLD AUTO: 0.2 K/UL — LOW (ref 1–3.3)
LYMPHOCYTES # BLD AUTO: 1.1 K/UL — SIGNIFICANT CHANGE UP (ref 1–3.3)
LYMPHOCYTES # BLD AUTO: 1.2 K/UL — SIGNIFICANT CHANGE UP (ref 1–3.3)
LYMPHOCYTES # BLD AUTO: 1.2 K/UL — SIGNIFICANT CHANGE UP (ref 1–3.3)
LYMPHOCYTES # BLD AUTO: 1.3 K/UL — SIGNIFICANT CHANGE UP (ref 1–3.3)
LYMPHOCYTES # BLD AUTO: 1.4 % — LOW (ref 13–44)
LYMPHOCYTES # BLD AUTO: 1.4 K/UL — SIGNIFICANT CHANGE UP (ref 1–3.3)
LYMPHOCYTES # BLD AUTO: 1.8 K/UL — SIGNIFICANT CHANGE UP (ref 1–3.3)
LYMPHOCYTES # BLD AUTO: 10.6 % — LOW (ref 13–44)
LYMPHOCYTES # BLD AUTO: 12 % — LOW (ref 13–44)
LYMPHOCYTES # BLD AUTO: 12.4 % — LOW (ref 13–44)
LYMPHOCYTES # BLD AUTO: 12.7 % — LOW (ref 13–44)
LYMPHOCYTES # BLD AUTO: 2.8 % — LOW (ref 13–44)
LYMPHOCYTES # BLD AUTO: 8.1 % — LOW (ref 13–44)
LYMPHOCYTES # BLD AUTO: 8.1 % — LOW (ref 13–44)
LYMPHOCYTES NFR SPEC AUTO: 1.7 % — LOW (ref 13–44)
M TB TUBERC IFN-G BLD QL: -0.02 IU/ML — SIGNIFICANT CHANGE UP
M TB TUBERC IFN-G BLD QL: 0.04 IU/ML — SIGNIFICANT CHANGE UP
M TB TUBERC IFN-G BLD QL: ABNORMAL
MACROCYTES BLD QL: SLIGHT — SIGNIFICANT CHANGE UP
MAGNESIUM SERPL-MCNC: 1.7 MG/DL — SIGNIFICANT CHANGE UP (ref 1.6–2.6)
MAGNESIUM SERPL-MCNC: 1.7 MG/DL — SIGNIFICANT CHANGE UP (ref 1.6–2.6)
MAGNESIUM SERPL-MCNC: 1.8 MG/DL — SIGNIFICANT CHANGE UP (ref 1.6–2.6)
MAGNESIUM SERPL-MCNC: 1.9 MG/DL — SIGNIFICANT CHANGE UP (ref 1.6–2.6)
MAGNESIUM SERPL-MCNC: 1.9 MG/DL — SIGNIFICANT CHANGE UP (ref 1.6–2.6)
MAGNESIUM SERPL-MCNC: 2 MG/DL — SIGNIFICANT CHANGE UP (ref 1.6–2.6)
MAGNESIUM SERPL-MCNC: 2 MG/DL — SIGNIFICANT CHANGE UP (ref 1.6–2.6)
MANUAL SMEAR VERIFICATION: SIGNIFICANT CHANGE UP
MANUAL SMEAR VERIFICATION: SIGNIFICANT CHANGE UP
MCHC RBC-ENTMCNC: 30.3 PG — SIGNIFICANT CHANGE UP (ref 27–34)
MCHC RBC-ENTMCNC: 30.3 PG — SIGNIFICANT CHANGE UP (ref 27–34)
MCHC RBC-ENTMCNC: 30.6 PG — SIGNIFICANT CHANGE UP (ref 27–34)
MCHC RBC-ENTMCNC: 30.9 PG — SIGNIFICANT CHANGE UP (ref 27–34)
MCHC RBC-ENTMCNC: 30.9 PG — SIGNIFICANT CHANGE UP (ref 27–34)
MCHC RBC-ENTMCNC: 31.4 PG — SIGNIFICANT CHANGE UP (ref 27–34)
MCHC RBC-ENTMCNC: 31.6 PG — SIGNIFICANT CHANGE UP (ref 27–34)
MCHC RBC-ENTMCNC: 31.9 GM/DL — LOW (ref 32–36)
MCHC RBC-ENTMCNC: 31.9 PG — SIGNIFICANT CHANGE UP (ref 27–34)
MCHC RBC-ENTMCNC: 32.1 PG — SIGNIFICANT CHANGE UP (ref 27–34)
MCHC RBC-ENTMCNC: 32.1 PG — SIGNIFICANT CHANGE UP (ref 27–34)
MCHC RBC-ENTMCNC: 32.4 PG — SIGNIFICANT CHANGE UP (ref 27–34)
MCHC RBC-ENTMCNC: 32.6 PG — SIGNIFICANT CHANGE UP (ref 27–34)
MCHC RBC-ENTMCNC: 32.9 GM/DL — SIGNIFICANT CHANGE UP (ref 32–36)
MCHC RBC-ENTMCNC: 33.2 % — SIGNIFICANT CHANGE UP (ref 32–36)
MCHC RBC-ENTMCNC: 33.2 GM/DL — SIGNIFICANT CHANGE UP (ref 32–36)
MCHC RBC-ENTMCNC: 33.3 GM/DL — SIGNIFICANT CHANGE UP (ref 32–36)
MCHC RBC-ENTMCNC: 33.4 GM/DL — SIGNIFICANT CHANGE UP (ref 32–36)
MCHC RBC-ENTMCNC: 33.5 GM/DL — SIGNIFICANT CHANGE UP (ref 32–36)
MCHC RBC-ENTMCNC: 33.7 % — SIGNIFICANT CHANGE UP (ref 32–36)
MCHC RBC-ENTMCNC: 33.9 GM/DL — SIGNIFICANT CHANGE UP (ref 32–36)
MCHC RBC-ENTMCNC: 34.5 GM/DL — SIGNIFICANT CHANGE UP (ref 32–36)
MCHC RBC-ENTMCNC: 34.7 % — SIGNIFICANT CHANGE UP (ref 32–36)
MCHC RBC-ENTMCNC: 35 GM/DL — SIGNIFICANT CHANGE UP (ref 32–36)
MCV RBC AUTO: 88.1 FL — SIGNIFICANT CHANGE UP (ref 80–100)
MCV RBC AUTO: 91.3 FL — SIGNIFICANT CHANGE UP (ref 80–100)
MCV RBC AUTO: 91.5 FL — SIGNIFICANT CHANGE UP (ref 80–100)
MCV RBC AUTO: 91.8 FL — SIGNIFICANT CHANGE UP (ref 80–100)
MCV RBC AUTO: 92.7 FL — SIGNIFICANT CHANGE UP (ref 80–100)
MCV RBC AUTO: 94.4 FL — SIGNIFICANT CHANGE UP (ref 80–100)
MCV RBC AUTO: 94.4 FL — SIGNIFICANT CHANGE UP (ref 80–100)
MCV RBC AUTO: 94.7 FL — SIGNIFICANT CHANGE UP (ref 80–100)
MCV RBC AUTO: 94.9 FL — SIGNIFICANT CHANGE UP (ref 80–100)
MCV RBC AUTO: 95.2 FL — SIGNIFICANT CHANGE UP (ref 80–100)
MCV RBC AUTO: 95.5 FL — SIGNIFICANT CHANGE UP (ref 80–100)
MCV RBC AUTO: 97.7 FL — SIGNIFICANT CHANGE UP (ref 80–100)
METAMYELOCYTES # FLD: 20 % — HIGH (ref 0–1)
MITOGEN IGNF BCKGRD COR BLD-ACNC: 0 IU/ML — SIGNIFICANT CHANGE UP
MONOCYTES # BLD AUTO: 0.11 K/UL — SIGNIFICANT CHANGE UP (ref 0–0.9)
MONOCYTES # BLD AUTO: 0.2 K/UL — SIGNIFICANT CHANGE UP (ref 0–0.9)
MONOCYTES # BLD AUTO: 0.3 K/UL — SIGNIFICANT CHANGE UP (ref 0–0.9)
MONOCYTES # BLD AUTO: 0.4 K/UL — SIGNIFICANT CHANGE UP (ref 0–0.9)
MONOCYTES # BLD AUTO: 0.5 K/UL — SIGNIFICANT CHANGE UP (ref 0–0.9)
MONOCYTES # BLD AUTO: 0.8 K/UL — SIGNIFICANT CHANGE UP (ref 0–0.9)
MONOCYTES NFR BLD AUTO: 1.5 % — LOW (ref 2–14)
MONOCYTES NFR BLD AUTO: 2.1 % — SIGNIFICANT CHANGE UP (ref 2–14)
MONOCYTES NFR BLD AUTO: 2.3 % — SIGNIFICANT CHANGE UP (ref 2–14)
MONOCYTES NFR BLD AUTO: 2.9 % — SIGNIFICANT CHANGE UP (ref 2–14)
MONOCYTES NFR BLD AUTO: 3.3 % — SIGNIFICANT CHANGE UP (ref 2–14)
MONOCYTES NFR BLD AUTO: 3.6 % — SIGNIFICANT CHANGE UP (ref 2–14)
MONOCYTES NFR BLD AUTO: 3.8 % — SIGNIFICANT CHANGE UP (ref 2–14)
MONOCYTES NFR BLD AUTO: 5.3 % — SIGNIFICANT CHANGE UP (ref 2–14)
MONOCYTES NFR BLD: 4.4 % — SIGNIFICANT CHANGE UP (ref 2–9)
MUCOUS THREADS # UR AUTO: SIGNIFICANT CHANGE UP
MYELOCYTES NFR BLD: 2.6 % — HIGH (ref 0–0)
NEUTROPHIL AB SER-ACNC: 37.4 % — LOW (ref 43–77)
NEUTROPHILS # BLD AUTO: 10.2 K/UL — HIGH (ref 1.8–7.4)
NEUTROPHILS # BLD AUTO: 10.4 K/UL — HIGH (ref 1.8–7.4)
NEUTROPHILS # BLD AUTO: 11.6 K/UL — HIGH (ref 1.8–7.4)
NEUTROPHILS # BLD AUTO: 11.8 K/UL — HIGH (ref 1.8–7.4)
NEUTROPHILS # BLD AUTO: 12.8 K/UL — HIGH (ref 1.8–7.4)
NEUTROPHILS # BLD AUTO: 6.81 K/UL — SIGNIFICANT CHANGE UP (ref 1.8–7.4)
NEUTROPHILS # BLD AUTO: 8 K/UL — HIGH (ref 1.8–7.4)
NEUTROPHILS # BLD AUTO: 8.26 K/UL — HIGH (ref 1.8–7.4)
NEUTROPHILS NFR BLD AUTO: 81.6 % — HIGH (ref 43–77)
NEUTROPHILS NFR BLD AUTO: 83 % — HIGH (ref 43–77)
NEUTROPHILS NFR BLD AUTO: 84.2 % — HIGH (ref 43–77)
NEUTROPHILS NFR BLD AUTO: 86.7 % — HIGH (ref 43–77)
NEUTROPHILS NFR BLD AUTO: 87.5 % — HIGH (ref 43–77)
NEUTROPHILS NFR BLD AUTO: 88.1 % — HIGH (ref 43–77)
NEUTROPHILS NFR BLD AUTO: 95 % — HIGH (ref 43–77)
NEUTROPHILS NFR BLD AUTO: 95.6 % — HIGH (ref 43–77)
NEUTS BAND # BLD: 33.9 % — HIGH (ref 0–6)
NITRITE UR-MCNC: NEGATIVE — SIGNIFICANT CHANGE UP
NON-SQ EPI CELLS # UR AUTO: <1 — SIGNIFICANT CHANGE UP
NRBC # FLD: 0 — SIGNIFICANT CHANGE UP
OB PNL STL: POSITIVE
OB PNL STL: POSITIVE — SIGNIFICANT CHANGE UP
OSMOLALITY SERPL: 258 MOS/KG — LOW (ref 275–300)
OSMOLALITY UR: 207 MOS/KG — SIGNIFICANT CHANGE UP (ref 50–1200)
OSMOLALITY UR: 321 MOS/KG — SIGNIFICANT CHANGE UP (ref 50–1200)
OSMOLALITY UR: 342 MOS/KG — SIGNIFICANT CHANGE UP (ref 50–1200)
OVALOCYTES BLD QL SMEAR: SLIGHT — SIGNIFICANT CHANGE UP
PCO2 BLDA: 38 MMHG — SIGNIFICANT CHANGE UP (ref 35–48)
PCO2 BLDA: 45 MMHG — SIGNIFICANT CHANGE UP (ref 35–48)
PCO2 BLDA: 46 MMHG — SIGNIFICANT CHANGE UP (ref 35–48)
PCO2 BLDA: 56 MMHG — HIGH (ref 35–48)
PCO2 BLDA: 61 MMHG — HIGH (ref 35–48)
PCO2 BLDV: 50 MMHG — SIGNIFICANT CHANGE UP (ref 41–51)
PCO2 BLDV: 60 MMHG — HIGH (ref 35–50)
PCP SPEC-MCNC: SIGNIFICANT CHANGE UP
PF4 HEPARIN CMPLX AB SER-ACNC: NEGATIVE — SIGNIFICANT CHANGE UP
PF4 HEPARIN CMPLX AB SERPL QL IA: 0.23 ABS — SIGNIFICANT CHANGE UP
PH BLDA: 7.16 PH — CRITICAL LOW (ref 7.35–7.45)
PH BLDA: 7.23 PH — LOW (ref 7.35–7.45)
PH BLDA: 7.24 PH — LOW (ref 7.35–7.45)
PH BLDA: 7.24 PH — LOW (ref 7.35–7.45)
PH BLDA: 7.25 PH — LOW (ref 7.35–7.45)
PH BLDV: 7.32 PH — SIGNIFICANT CHANGE UP (ref 7.32–7.43)
PH BLDV: 7.33 — LOW (ref 7.35–7.45)
PH UR: 6 — SIGNIFICANT CHANGE UP (ref 4.6–8)
PH UR: 7 — SIGNIFICANT CHANGE UP (ref 5–8)
PH UR: 8 — SIGNIFICANT CHANGE UP (ref 5–8)
PHOSPHATE 24H UR-MCNC: 27 MG/DL — SIGNIFICANT CHANGE UP
PHOSPHATE SERPL-MCNC: 0.8 MG/DL — CRITICAL LOW (ref 2.5–4.5)
PHOSPHATE SERPL-MCNC: 1.5 MG/DL — LOW (ref 2.5–4.5)
PHOSPHATE SERPL-MCNC: 1.9 MG/DL — LOW (ref 2.5–4.5)
PHOSPHATE SERPL-MCNC: 2 MG/DL — LOW (ref 2.5–4.5)
PHOSPHATE SERPL-MCNC: 2.9 MG/DL — SIGNIFICANT CHANGE UP (ref 2.5–4.5)
PHOSPHATE SERPL-MCNC: 3.3 MG/DL — SIGNIFICANT CHANGE UP (ref 2.5–4.5)
PHOSPHATE SERPL-MCNC: 4.6 MG/DL — HIGH (ref 2.5–4.5)
PLATELET # BLD AUTO: 110 K/UL — LOW (ref 150–400)
PLATELET # BLD AUTO: 114 K/UL — LOW (ref 150–400)
PLATELET # BLD AUTO: 119 K/UL — LOW (ref 150–400)
PLATELET # BLD AUTO: 139 K/UL — LOW (ref 150–400)
PLATELET # BLD AUTO: 164 K/UL — SIGNIFICANT CHANGE UP (ref 150–400)
PLATELET # BLD AUTO: 186 K/UL — SIGNIFICANT CHANGE UP (ref 150–400)
PLATELET # BLD AUTO: 196 K/UL — SIGNIFICANT CHANGE UP (ref 150–400)
PLATELET # BLD AUTO: 209 K/UL — SIGNIFICANT CHANGE UP (ref 150–400)
PLATELET # BLD AUTO: 245 K/UL — SIGNIFICANT CHANGE UP (ref 150–400)
PLATELET # BLD AUTO: 358 K/UL — SIGNIFICANT CHANGE UP (ref 150–400)
PLATELET # BLD AUTO: 76 K/UL — LOW (ref 150–400)
PLATELET # BLD AUTO: 82 K/UL — LOW (ref 150–400)
PLATELET COUNT - ESTIMATE: SIGNIFICANT CHANGE UP
PLATELET COUNT - ESTIMATE: SIGNIFICANT CHANGE UP
PMV BLD: 12.3 FL — SIGNIFICANT CHANGE UP (ref 7–13)
PMV BLD: 13 FL — SIGNIFICANT CHANGE UP (ref 7–13)
PMV BLD: SIGNIFICANT CHANGE UP FL (ref 7–13)
PO2 BLDA: 276 MMHG — HIGH (ref 83–108)
PO2 BLDA: 336 MMHG — HIGH (ref 83–108)
PO2 BLDA: 418 MMHG — HIGH (ref 83–108)
PO2 BLDA: 61 MMHG — LOW (ref 83–108)
PO2 BLDA: 86 MMHG — SIGNIFICANT CHANGE UP (ref 83–108)
PO2 BLDV: 19 MMHG — LOW (ref 25–45)
PO2 BLDV: 57 MMHG — HIGH (ref 35–40)
POIKILOCYTOSIS BLD QL AUTO: SLIGHT — SIGNIFICANT CHANGE UP
POIKILOCYTOSIS BLD QL AUTO: SLIGHT — SIGNIFICANT CHANGE UP
POTASSIUM BLDA-SCNC: 3.6 MMOL/L — SIGNIFICANT CHANGE UP (ref 3.4–4.5)
POTASSIUM BLDA-SCNC: 3.9 MMOL/L — SIGNIFICANT CHANGE UP (ref 3.4–4.5)
POTASSIUM BLDA-SCNC: 3.9 MMOL/L — SIGNIFICANT CHANGE UP (ref 3.4–4.5)
POTASSIUM BLDA-SCNC: 4.7 MMOL/L — HIGH (ref 3.4–4.5)
POTASSIUM BLDA-SCNC: 5.2 MMOL/L — HIGH (ref 3.4–4.5)
POTASSIUM BLDV-SCNC: 3.8 MMOL/L — SIGNIFICANT CHANGE UP (ref 3.5–5)
POTASSIUM BLDV-SCNC: 4.4 MMOL/L — SIGNIFICANT CHANGE UP (ref 3.4–4.5)
POTASSIUM SERPL-MCNC: 3 MMOL/L — LOW (ref 3.5–5.3)
POTASSIUM SERPL-MCNC: 3.2 MMOL/L — LOW (ref 3.5–5.3)
POTASSIUM SERPL-MCNC: 3.3 MMOL/L — LOW (ref 3.5–5.3)
POTASSIUM SERPL-MCNC: 3.4 MMOL/L — LOW (ref 3.5–5.3)
POTASSIUM SERPL-MCNC: 3.4 MMOL/L — LOW (ref 3.5–5.3)
POTASSIUM SERPL-MCNC: 3.5 MMOL/L — SIGNIFICANT CHANGE UP (ref 3.5–5.3)
POTASSIUM SERPL-MCNC: 3.5 MMOL/L — SIGNIFICANT CHANGE UP (ref 3.5–5.3)
POTASSIUM SERPL-MCNC: 3.6 MMOL/L — SIGNIFICANT CHANGE UP (ref 3.5–5.3)
POTASSIUM SERPL-MCNC: 3.6 MMOL/L — SIGNIFICANT CHANGE UP (ref 3.5–5.3)
POTASSIUM SERPL-MCNC: 3.7 MMOL/L — SIGNIFICANT CHANGE UP (ref 3.5–5.3)
POTASSIUM SERPL-MCNC: 4.1 MMOL/L — SIGNIFICANT CHANGE UP (ref 3.5–5.3)
POTASSIUM SERPL-MCNC: 4.2 MMOL/L — SIGNIFICANT CHANGE UP (ref 3.5–5.3)
POTASSIUM SERPL-MCNC: 4.3 MMOL/L — SIGNIFICANT CHANGE UP (ref 3.5–5.3)
POTASSIUM SERPL-MCNC: 4.3 MMOL/L — SIGNIFICANT CHANGE UP (ref 3.5–5.3)
POTASSIUM SERPL-MCNC: 4.5 MMOL/L — SIGNIFICANT CHANGE UP (ref 3.5–5.3)
POTASSIUM SERPL-MCNC: 4.6 MMOL/L — SIGNIFICANT CHANGE UP (ref 3.5–5.3)
POTASSIUM SERPL-MCNC: 4.9 MMOL/L — SIGNIFICANT CHANGE UP (ref 3.5–5.3)
POTASSIUM SERPL-MCNC: 5 MMOL/L — SIGNIFICANT CHANGE UP (ref 3.5–5.3)
POTASSIUM SERPL-SCNC: 3 MMOL/L — LOW (ref 3.5–5.3)
POTASSIUM SERPL-SCNC: 3.2 MMOL/L — LOW (ref 3.5–5.3)
POTASSIUM SERPL-SCNC: 3.3 MMOL/L — LOW (ref 3.5–5.3)
POTASSIUM SERPL-SCNC: 3.4 MMOL/L — LOW (ref 3.5–5.3)
POTASSIUM SERPL-SCNC: 3.4 MMOL/L — LOW (ref 3.5–5.3)
POTASSIUM SERPL-SCNC: 3.5 MMOL/L — SIGNIFICANT CHANGE UP (ref 3.5–5.3)
POTASSIUM SERPL-SCNC: 3.5 MMOL/L — SIGNIFICANT CHANGE UP (ref 3.5–5.3)
POTASSIUM SERPL-SCNC: 3.6 MMOL/L — SIGNIFICANT CHANGE UP (ref 3.5–5.3)
POTASSIUM SERPL-SCNC: 3.6 MMOL/L — SIGNIFICANT CHANGE UP (ref 3.5–5.3)
POTASSIUM SERPL-SCNC: 3.7 MMOL/L — SIGNIFICANT CHANGE UP (ref 3.5–5.3)
POTASSIUM SERPL-SCNC: 4.1 MMOL/L — SIGNIFICANT CHANGE UP (ref 3.5–5.3)
POTASSIUM SERPL-SCNC: 4.2 MMOL/L — SIGNIFICANT CHANGE UP (ref 3.5–5.3)
POTASSIUM SERPL-SCNC: 4.3 MMOL/L — SIGNIFICANT CHANGE UP (ref 3.5–5.3)
POTASSIUM SERPL-SCNC: 4.3 MMOL/L — SIGNIFICANT CHANGE UP (ref 3.5–5.3)
POTASSIUM SERPL-SCNC: 4.5 MMOL/L — SIGNIFICANT CHANGE UP (ref 3.5–5.3)
POTASSIUM SERPL-SCNC: 4.6 MMOL/L — SIGNIFICANT CHANGE UP (ref 3.5–5.3)
POTASSIUM SERPL-SCNC: 4.9 MMOL/L — SIGNIFICANT CHANGE UP (ref 3.5–5.3)
POTASSIUM SERPL-SCNC: 5 MMOL/L — SIGNIFICANT CHANGE UP (ref 3.5–5.3)
PREALB SERPL-MCNC: 5 MG/DL — LOW (ref 20–40)
PROT PATTERN SERPL ELPH-IMP: SIGNIFICANT CHANGE UP
PROT SERPL-MCNC: 3.5 G/DL — LOW (ref 6–8.3)
PROT SERPL-MCNC: 3.8 G/DL — LOW (ref 6–8.3)
PROT SERPL-MCNC: 4.1 G/DL — LOW (ref 6–8.3)
PROT SERPL-MCNC: 5 G/DL — LOW (ref 6–8.3)
PROT SERPL-MCNC: 5 G/DL — LOW (ref 6–8.3)
PROT SERPL-MCNC: 5.5 G/DL — LOW (ref 6–8.3)
PROT SERPL-MCNC: 5.6 G/DL — LOW (ref 6–8.3)
PROT SERPL-MCNC: 6 G/DL — SIGNIFICANT CHANGE UP (ref 6–8.3)
PROT SERPL-MCNC: 6.9 G/DL — SIGNIFICANT CHANGE UP (ref 6–8.3)
PROT UR-MCNC: 100 — SIGNIFICANT CHANGE UP
PROT UR-MCNC: NEGATIVE — SIGNIFICANT CHANGE UP
PROT UR-MCNC: NEGATIVE — SIGNIFICANT CHANGE UP
PROTHROM AB SERPL-ACNC: 15 SEC — HIGH (ref 9.8–12.7)
PROTHROM AB SERPL-ACNC: 15.6 SEC — HIGH (ref 9.8–12.7)
PROTHROM AB SERPL-ACNC: 16.7 SEC — HIGH (ref 9.8–12.7)
PROTHROM AB SERPL-ACNC: 19.8 SEC — HIGH (ref 9.8–13.1)
PROTHROM AB SERPL-ACNC: 27.7 SEC — HIGH (ref 9.8–13.1)
PROTHROM AB SERPL-ACNC: 41 SEC — HIGH (ref 9.8–13.1)
RBC # BLD: 2.31 M/UL — LOW (ref 4.2–5.8)
RBC # BLD: 2.52 M/UL — LOW (ref 4.2–5.8)
RBC # BLD: 2.82 M/UL — LOW (ref 4.2–5.8)
RBC # BLD: 3.61 M/UL — LOW (ref 4.2–5.8)
RBC # BLD: 3.7 M/UL — LOW (ref 4.2–5.8)
RBC # BLD: 3.74 M/UL — LOW (ref 4.2–5.8)
RBC # BLD: 3.82 M/UL — LOW (ref 4.2–5.8)
RBC # BLD: 3.94 M/UL — LOW (ref 4.2–5.8)
RBC # BLD: 4.03 M/UL — LOW (ref 4.2–5.8)
RBC # BLD: 4.04 M/UL — LOW (ref 4.2–5.8)
RBC # BLD: 4.06 M/UL — LOW (ref 4.2–5.8)
RBC # BLD: 4.1 M/UL — LOW (ref 4.2–5.8)
RBC # BLD: 4.36 M/UL — SIGNIFICANT CHANGE UP (ref 4.2–5.8)
RBC # FLD: 12.4 % — SIGNIFICANT CHANGE UP (ref 10.3–14.5)
RBC # FLD: 12.4 % — SIGNIFICANT CHANGE UP (ref 10.3–14.5)
RBC # FLD: 12.8 % — SIGNIFICANT CHANGE UP (ref 10.3–14.5)
RBC # FLD: 12.8 % — SIGNIFICANT CHANGE UP (ref 10.3–14.5)
RBC # FLD: 13 % — SIGNIFICANT CHANGE UP (ref 10.3–14.5)
RBC # FLD: 13.2 % — SIGNIFICANT CHANGE UP (ref 10.3–14.5)
RBC # FLD: 13.2 % — SIGNIFICANT CHANGE UP (ref 10.3–14.5)
RBC # FLD: 13.9 % — SIGNIFICANT CHANGE UP (ref 10.3–14.5)
RBC # FLD: 14 % — SIGNIFICANT CHANGE UP (ref 10.3–14.5)
RBC # FLD: SIGNIFICANT CHANGE UP % (ref 10.3–14.5)
RBC CASTS # UR COMP ASSIST: SIGNIFICANT CHANGE UP (ref 0–?)
RETICS #: 18 K/UL — LOW (ref 25–125)
RETICS/RBC NFR: 0.4 % — LOW (ref 0.5–2.5)
RH IG SCN BLD-IMP: POSITIVE — SIGNIFICANT CHANGE UP
RH IG SCN BLD-IMP: POSITIVE — SIGNIFICANT CHANGE UP
SAO2 % BLDA: 87 % — LOW (ref 95–99)
SAO2 % BLDA: 93.7 % — LOW (ref 95–99)
SAO2 % BLDA: 98.9 % — SIGNIFICANT CHANGE UP (ref 95–99)
SAO2 % BLDA: 99.2 % — HIGH (ref 95–99)
SAO2 % BLDA: 99.2 % — HIGH (ref 95–99)
SAO2 % BLDV: 26 % — LOW (ref 67–88)
SAO2 % BLDV: 84.6 % — SIGNIFICANT CHANGE UP (ref 60–85)
SCHISTOCYTES BLD QL AUTO: SLIGHT — SIGNIFICANT CHANGE UP
SCHISTOCYTES BLD QL AUTO: SLIGHT — SIGNIFICANT CHANGE UP
SODIUM BLDA-SCNC: 128 MMOL/L — LOW (ref 136–146)
SODIUM BLDA-SCNC: 132 MMOL/L — LOW (ref 136–146)
SODIUM BLDA-SCNC: 134 MMOL/L — LOW (ref 136–146)
SODIUM BLDA-SCNC: 135 MMOL/L — LOW (ref 136–146)
SODIUM BLDA-SCNC: 135 MMOL/L — LOW (ref 136–146)
SODIUM SERPL-SCNC: 121 MMOL/L — LOW (ref 135–145)
SODIUM SERPL-SCNC: 129 MMOL/L — LOW (ref 135–145)
SODIUM SERPL-SCNC: 129 MMOL/L — LOW (ref 135–145)
SODIUM SERPL-SCNC: 130 MMOL/L — LOW (ref 135–145)
SODIUM SERPL-SCNC: 130 MMOL/L — LOW (ref 135–145)
SODIUM SERPL-SCNC: 131 MMOL/L — LOW (ref 135–145)
SODIUM SERPL-SCNC: 132 MMOL/L — LOW (ref 135–145)
SODIUM SERPL-SCNC: 133 MMOL/L — LOW (ref 135–145)
SODIUM SERPL-SCNC: 134 MMOL/L — LOW (ref 135–145)
SODIUM SERPL-SCNC: 135 MMOL/L — SIGNIFICANT CHANGE UP (ref 135–145)
SODIUM SERPL-SCNC: 135 MMOL/L — SIGNIFICANT CHANGE UP (ref 135–145)
SODIUM SERPL-SCNC: 136 MMOL/L — SIGNIFICANT CHANGE UP (ref 135–145)
SODIUM SERPL-SCNC: 137 MMOL/L — SIGNIFICANT CHANGE UP (ref 135–145)
SODIUM SERPL-SCNC: 137 MMOL/L — SIGNIFICANT CHANGE UP (ref 135–145)
SODIUM UR-SCNC: 65 MMOL/L — SIGNIFICANT CHANGE UP (ref 40–220)
SODIUM UR-SCNC: 70 MMOL/L — SIGNIFICANT CHANGE UP (ref 40–220)
SODIUM UR-SCNC: 96 MMOL/L — SIGNIFICANT CHANGE UP (ref 40–220)
SP GR SPEC: 1.01 — SIGNIFICANT CHANGE UP (ref 1.01–1.02)
SP GR SPEC: 1.01 — SIGNIFICANT CHANGE UP (ref 1.01–1.02)
SP GR SPEC: 1.03 — HIGH (ref 1–1.03)
SPECIMEN SOURCE: SIGNIFICANT CHANGE UP
T PALLIDUM AB TITR SER: NEGATIVE — SIGNIFICANT CHANGE UP
T-CELL CD4 SUBSET PNL BLD: 36 /UL — LOW (ref 489–1457)
T4 FREE SERPL-MCNC: 1.6 NG/DL — SIGNIFICANT CHANGE UP (ref 0.9–1.8)
TIBC SERPL-MCNC: 199 UG/DL — LOW (ref 250–450)
TOTAL CHOLESTEROL/HDL RATIO MEASUREMENT: 1.1 RATIO — LOW (ref 3.4–9.6)
TRIGL SERPL-MCNC: <15 MG/DL — SIGNIFICANT CHANGE UP (ref 10–149)
TROPONIN T SERPL-MCNC: 0.31 NG/ML — HIGH (ref 0–0.06)
TSH SERPL-MCNC: 3.33 UU/ML — SIGNIFICANT CHANGE UP (ref 0.34–4.82)
UIBC SERPL-MCNC: 24 UG/DL — LOW (ref 110–370)
UROBILINOGEN FLD QL: NEGATIVE — SIGNIFICANT CHANGE UP
UROBILINOGEN FLD QL: NEGATIVE — SIGNIFICANT CHANGE UP
UROBILINOGEN FLD QL: NORMAL E.U. — SIGNIFICANT CHANGE UP (ref 0.1–0.2)
VIT B12 SERPL-MCNC: >2000 PG/ML — HIGH (ref 243–894)
VIT B12 SERPL-MCNC: >2000 PG/ML — HIGH (ref 243–894)
VIT D25+D1,25 OH+D1,25 PNL SERPL-MCNC: 82.5 PG/ML — HIGH (ref 19.9–79.3)
WBC # BLD: 10.1 K/UL — SIGNIFICANT CHANGE UP (ref 3.8–10.5)
WBC # BLD: 10.95 K/UL — HIGH (ref 3.8–10.5)
WBC # BLD: 12 K/UL — HIGH (ref 3.8–10.5)
WBC # BLD: 12 K/UL — HIGH (ref 3.8–10.5)
WBC # BLD: 12.5 K/UL — HIGH (ref 3.8–10.5)
WBC # BLD: 13.4 K/UL — HIGH (ref 3.8–10.5)
WBC # BLD: 14.3 K/UL — HIGH (ref 3.8–10.5)
WBC # BLD: 14.6 K/UL — HIGH (ref 3.8–10.5)
WBC # BLD: 15.3 K/UL — HIGH (ref 3.8–10.5)
WBC # BLD: 7.17 K/UL — SIGNIFICANT CHANGE UP (ref 3.8–10.5)
WBC # BLD: 8.64 K/UL — SIGNIFICANT CHANGE UP (ref 3.8–10.5)
WBC # BLD: 9.6 K/UL — SIGNIFICANT CHANGE UP (ref 3.8–10.5)
WBC # FLD AUTO: 10.1 K/UL — SIGNIFICANT CHANGE UP (ref 3.8–10.5)
WBC # FLD AUTO: 10.95 K/UL — HIGH (ref 3.8–10.5)
WBC # FLD AUTO: 12 K/UL — HIGH (ref 3.8–10.5)
WBC # FLD AUTO: 12 K/UL — HIGH (ref 3.8–10.5)
WBC # FLD AUTO: 12.5 K/UL — HIGH (ref 3.8–10.5)
WBC # FLD AUTO: 13.4 K/UL — HIGH (ref 3.8–10.5)
WBC # FLD AUTO: 14.3 K/UL — HIGH (ref 3.8–10.5)
WBC # FLD AUTO: 14.6 K/UL — HIGH (ref 3.8–10.5)
WBC # FLD AUTO: 15.3 K/UL — HIGH (ref 3.8–10.5)
WBC # FLD AUTO: 7.17 K/UL — SIGNIFICANT CHANGE UP (ref 3.8–10.5)
WBC # FLD AUTO: 8.64 K/UL — SIGNIFICANT CHANGE UP (ref 3.8–10.5)
WBC # FLD AUTO: 9.6 K/UL — SIGNIFICANT CHANGE UP (ref 3.8–10.5)
WBC UR QL: SIGNIFICANT CHANGE UP (ref 0–?)

## 2017-01-01 PROCEDURE — 82533 TOTAL CORTISOL: CPT

## 2017-01-01 PROCEDURE — 84165 PROTEIN E-PHORESIS SERUM: CPT

## 2017-01-01 PROCEDURE — 82652 VIT D 1 25-DIHYDROXY: CPT

## 2017-01-01 PROCEDURE — 74020: CPT | Mod: 26

## 2017-01-01 PROCEDURE — 87177 OVA AND PARASITES SMEARS: CPT

## 2017-01-01 PROCEDURE — 87329 GIARDIA AG IA: CPT

## 2017-01-01 PROCEDURE — 81001 URINALYSIS AUTO W/SCOPE: CPT

## 2017-01-01 PROCEDURE — 86780 TREPONEMA PALLIDUM: CPT

## 2017-01-01 PROCEDURE — 82570 ASSAY OF URINE CREATININE: CPT

## 2017-01-01 PROCEDURE — 85384 FIBRINOGEN ACTIVITY: CPT

## 2017-01-01 PROCEDURE — 87493 C DIFF AMPLIFIED PROBE: CPT

## 2017-01-01 PROCEDURE — 85045 AUTOMATED RETICULOCYTE COUNT: CPT

## 2017-01-01 PROCEDURE — 99254 IP/OBS CNSLTJ NEW/EST MOD 60: CPT | Mod: 57

## 2017-01-01 PROCEDURE — 84300 ASSAY OF URINE SODIUM: CPT

## 2017-01-01 PROCEDURE — 80048 BASIC METABOLIC PNL TOTAL CA: CPT

## 2017-01-01 PROCEDURE — 71010: CPT | Mod: 26

## 2017-01-01 PROCEDURE — 99053 MED SERV 10PM-8AM 24 HR FAC: CPT

## 2017-01-01 PROCEDURE — 82705 FATS/LIPIDS FECES QUAL: CPT

## 2017-01-01 PROCEDURE — 85379 FIBRIN DEGRADATION QUANT: CPT

## 2017-01-01 PROCEDURE — 85014 HEMATOCRIT: CPT

## 2017-01-01 PROCEDURE — 99223 1ST HOSP IP/OBS HIGH 75: CPT | Mod: 57,GC

## 2017-01-01 PROCEDURE — 85027 COMPLETE CBC AUTOMATED: CPT

## 2017-01-01 PROCEDURE — 99239 HOSP IP/OBS DSCHRG MGMT >30: CPT

## 2017-01-01 PROCEDURE — 99285 EMERGENCY DEPT VISIT HI MDM: CPT | Mod: 25

## 2017-01-01 PROCEDURE — 99233 SBSQ HOSP IP/OBS HIGH 50: CPT | Mod: GC

## 2017-01-01 PROCEDURE — 86703 HIV-1/HIV-2 1 RESULT ANTBDY: CPT

## 2017-01-01 PROCEDURE — 83690 ASSAY OF LIPASE: CPT

## 2017-01-01 PROCEDURE — 84295 ASSAY OF SERUM SODIUM: CPT

## 2017-01-01 PROCEDURE — 82164 ANGIOTENSIN I ENZYME TEST: CPT

## 2017-01-01 PROCEDURE — 71010: CPT | Mod: 26,77

## 2017-01-01 PROCEDURE — 83605 ASSAY OF LACTIC ACID: CPT

## 2017-01-01 PROCEDURE — 99285 EMERGENCY DEPT VISIT HI MDM: CPT

## 2017-01-01 PROCEDURE — 81003 URINALYSIS AUTO W/O SCOPE: CPT

## 2017-01-01 PROCEDURE — 82746 ASSAY OF FOLIC ACID SERUM: CPT

## 2017-01-01 PROCEDURE — 93010 ELECTROCARDIOGRAM REPORT: CPT

## 2017-01-01 PROCEDURE — 86682 HELMINTH ANTIBODY: CPT

## 2017-01-01 PROCEDURE — 82728 ASSAY OF FERRITIN: CPT

## 2017-01-01 PROCEDURE — 74177 CT ABD & PELVIS W/CONTRAST: CPT

## 2017-01-01 PROCEDURE — 35761: CPT | Mod: GC

## 2017-01-01 PROCEDURE — 70551 MRI BRAIN STEM W/O DYE: CPT

## 2017-01-01 PROCEDURE — 82962 GLUCOSE BLOOD TEST: CPT

## 2017-01-01 PROCEDURE — 82150 ASSAY OF AMYLASE: CPT

## 2017-01-01 PROCEDURE — 80061 LIPID PANEL: CPT

## 2017-01-01 PROCEDURE — 86038 ANTINUCLEAR ANTIBODIES: CPT

## 2017-01-01 PROCEDURE — 49000 EXPLORATION OF ABDOMEN: CPT | Mod: GC

## 2017-01-01 PROCEDURE — 99232 SBSQ HOSP IP/OBS MODERATE 35: CPT

## 2017-01-01 PROCEDURE — 84100 ASSAY OF PHOSPHORUS: CPT

## 2017-01-01 PROCEDURE — 71250 CT THORAX DX C-: CPT | Mod: 26

## 2017-01-01 PROCEDURE — 80053 COMPREHEN METABOLIC PANEL: CPT

## 2017-01-01 PROCEDURE — 85730 THROMBOPLASTIN TIME PARTIAL: CPT

## 2017-01-01 PROCEDURE — 74177 CT ABD & PELVIS W/CONTRAST: CPT | Mod: 26

## 2017-01-01 PROCEDURE — 82009 KETONE BODYS QUAL: CPT

## 2017-01-01 PROCEDURE — 82803 BLOOD GASES ANY COMBINATION: CPT

## 2017-01-01 PROCEDURE — 74020: CPT

## 2017-01-01 PROCEDURE — 99284 EMERGENCY DEPT VISIT MOD MDM: CPT | Mod: 25

## 2017-01-01 PROCEDURE — 86900 BLOOD TYPING SEROLOGIC ABO: CPT

## 2017-01-01 PROCEDURE — 83993 ASSAY FOR CALPROTECTIN FECAL: CPT

## 2017-01-01 PROCEDURE — 74185 MRA ABD W OR W/O CNTRST: CPT | Mod: 26

## 2017-01-01 PROCEDURE — 99233 SBSQ HOSP IP/OBS HIGH 50: CPT

## 2017-01-01 PROCEDURE — C8900: CPT

## 2017-01-01 PROCEDURE — 71045 X-RAY EXAM CHEST 1 VIEW: CPT

## 2017-01-01 PROCEDURE — 83550 IRON BINDING TEST: CPT

## 2017-01-01 PROCEDURE — 87040 BLOOD CULTURE FOR BACTERIA: CPT

## 2017-01-01 PROCEDURE — 84439 ASSAY OF FREE THYROXINE: CPT

## 2017-01-01 PROCEDURE — 87338 HPYLORI STOOL AG IA: CPT

## 2017-01-01 PROCEDURE — 86901 BLOOD TYPING SEROLOGIC RH(D): CPT

## 2017-01-01 PROCEDURE — 82306 VITAMIN D 25 HYDROXY: CPT

## 2017-01-01 PROCEDURE — 36415 COLL VENOUS BLD VENIPUNCTURE: CPT

## 2017-01-01 PROCEDURE — 82784 ASSAY IGA/IGD/IGG/IGM EACH: CPT

## 2017-01-01 PROCEDURE — 83036 HEMOGLOBIN GLYCOSYLATED A1C: CPT

## 2017-01-01 PROCEDURE — 84681 ASSAY OF C-PEPTIDE: CPT

## 2017-01-01 PROCEDURE — 83935 ASSAY OF URINE OSMOLALITY: CPT

## 2017-01-01 PROCEDURE — 88312 SPECIAL STAINS GROUP 1: CPT

## 2017-01-01 PROCEDURE — 85652 RBC SED RATE AUTOMATED: CPT

## 2017-01-01 PROCEDURE — 99223 1ST HOSP IP/OBS HIGH 75: CPT | Mod: 25

## 2017-01-01 PROCEDURE — 88312 SPECIAL STAINS GROUP 1: CPT | Mod: 26

## 2017-01-01 PROCEDURE — 82607 VITAMIN B-12: CPT

## 2017-01-01 PROCEDURE — 96375 TX/PRO/DX INJ NEW DRUG ADDON: CPT

## 2017-01-01 PROCEDURE — 70551 MRI BRAIN STEM W/O DYE: CPT | Mod: 26

## 2017-01-01 PROCEDURE — 86140 C-REACTIVE PROTEIN: CPT

## 2017-01-01 PROCEDURE — 86850 RBC ANTIBODY SCREEN: CPT

## 2017-01-01 PROCEDURE — 74183 MRI ABD W/O CNTR FLWD CNTR: CPT | Mod: 26

## 2017-01-01 PROCEDURE — 83525 ASSAY OF INSULIN: CPT

## 2017-01-01 PROCEDURE — 71250 CT THORAX DX C-: CPT

## 2017-01-01 PROCEDURE — 84132 ASSAY OF SERUM POTASSIUM: CPT

## 2017-01-01 PROCEDURE — 93005 ELECTROCARDIOGRAM TRACING: CPT

## 2017-01-01 PROCEDURE — 99223 1ST HOSP IP/OBS HIGH 75: CPT | Mod: GC

## 2017-01-01 PROCEDURE — 96374 THER/PROPH/DIAG INJ IV PUSH: CPT | Mod: XU

## 2017-01-01 PROCEDURE — 94640 AIRWAY INHALATION TREATMENT: CPT

## 2017-01-01 PROCEDURE — 82436 ASSAY OF URINE CHLORIDE: CPT

## 2017-01-01 PROCEDURE — 82272 OCCULT BLD FECES 1-3 TESTS: CPT

## 2017-01-01 PROCEDURE — 86360 T CELL ABSOLUTE COUNT/RATIO: CPT

## 2017-01-01 PROCEDURE — 82947 ASSAY GLUCOSE BLOOD QUANT: CPT

## 2017-01-01 PROCEDURE — 87046 STOOL CULTR AEROBIC BACT EA: CPT

## 2017-01-01 PROCEDURE — 86480 TB TEST CELL IMMUN MEASURE: CPT

## 2017-01-01 PROCEDURE — 82024 ASSAY OF ACTH: CPT

## 2017-01-01 PROCEDURE — 82435 ASSAY OF BLOOD CHLORIDE: CPT

## 2017-01-01 PROCEDURE — 74183 MRI ABD W/O CNTR FLWD CNTR: CPT

## 2017-01-01 PROCEDURE — 83615 LACTATE (LD) (LDH) ENZYME: CPT

## 2017-01-01 PROCEDURE — 80307 DRUG TEST PRSMV CHEM ANLYZR: CPT

## 2017-01-01 PROCEDURE — 87045 FECES CULTURE AEROBIC BACT: CPT

## 2017-01-01 PROCEDURE — 84105 ASSAY OF URINE PHOSPHORUS: CPT

## 2017-01-01 PROCEDURE — 43239 EGD BIOPSY SINGLE/MULTIPLE: CPT

## 2017-01-01 PROCEDURE — 97162 PT EVAL MOD COMPLEX 30 MIN: CPT

## 2017-01-01 PROCEDURE — 86022 PLATELET ANTIBODIES: CPT

## 2017-01-01 PROCEDURE — 83930 ASSAY OF BLOOD OSMOLALITY: CPT

## 2017-01-01 PROCEDURE — 99291 CRITICAL CARE FIRST HOUR: CPT

## 2017-01-01 PROCEDURE — 85610 PROTHROMBIN TIME: CPT

## 2017-01-01 PROCEDURE — 82330 ASSAY OF CALCIUM: CPT

## 2017-01-01 PROCEDURE — 83735 ASSAY OF MAGNESIUM: CPT

## 2017-01-01 PROCEDURE — 84443 ASSAY THYROID STIM HORMONE: CPT

## 2017-01-01 RX ORDER — DEXTROSE 50 % IN WATER 50 %
1 SYRINGE (ML) INTRAVENOUS ONCE
Qty: 0 | Refills: 0 | Status: DISCONTINUED | OUTPATIENT
Start: 2017-01-01 | End: 2017-01-01

## 2017-01-01 RX ORDER — DEXTROSE 50 % IN WATER 50 %
25 SYRINGE (ML) INTRAVENOUS ONCE
Qty: 0 | Refills: 0 | Status: COMPLETED | OUTPATIENT
Start: 2017-01-01 | End: 2017-01-01

## 2017-01-01 RX ORDER — DEXTROSE MONOHYDRATE, SODIUM CHLORIDE, AND POTASSIUM CHLORIDE 50; .745; 4.5 G/1000ML; G/1000ML; G/1000ML
1000 INJECTION, SOLUTION INTRAVENOUS
Qty: 0 | Refills: 0 | Status: DISCONTINUED | OUTPATIENT
Start: 2017-01-01 | End: 2017-01-01

## 2017-01-01 RX ORDER — DEXTROSE 50 % IN WATER 50 %
25 SYRINGE (ML) INTRAVENOUS ONCE
Qty: 0 | Refills: 0 | Status: DISCONTINUED | OUTPATIENT
Start: 2017-01-01 | End: 2017-01-01

## 2017-01-01 RX ORDER — DEXTROSE 50 % IN WATER 50 %
12.5 SYRINGE (ML) INTRAVENOUS ONCE
Qty: 0 | Refills: 0 | Status: COMPLETED | OUTPATIENT
Start: 2017-01-01 | End: 2017-01-01

## 2017-01-01 RX ORDER — MORPHINE SULFATE 50 MG/1
2 CAPSULE, EXTENDED RELEASE ORAL EVERY 6 HOURS
Qty: 0 | Refills: 0 | Status: DISCONTINUED | OUTPATIENT
Start: 2017-01-01 | End: 2017-01-01

## 2017-01-01 RX ORDER — SODIUM CHLORIDE 9 MG/ML
1000 INJECTION INTRAMUSCULAR; INTRAVENOUS; SUBCUTANEOUS ONCE
Qty: 0 | Refills: 0 | Status: COMPLETED | OUTPATIENT
Start: 2017-01-01 | End: 2017-01-01

## 2017-01-01 RX ORDER — MORPHINE SULFATE 50 MG/1
2 CAPSULE, EXTENDED RELEASE ORAL ONCE
Qty: 0 | Refills: 0 | Status: DISCONTINUED | OUTPATIENT
Start: 2017-01-01 | End: 2017-01-01

## 2017-01-01 RX ORDER — SODIUM CHLORIDE 9 MG/ML
1000 INJECTION, SOLUTION INTRAVENOUS
Qty: 0 | Refills: 0 | Status: DISCONTINUED | OUTPATIENT
Start: 2017-01-01 | End: 2017-01-01

## 2017-01-01 RX ORDER — OXYCODONE HYDROCHLORIDE 5 MG/1
0 TABLET ORAL
Qty: 0 | Refills: 0 | COMMUNITY

## 2017-01-01 RX ORDER — NOREPINEPHRINE BITARTRATE/D5W 8 MG/250ML
0.1 PLASTIC BAG, INJECTION (ML) INTRAVENOUS
Qty: 8 | Refills: 0 | Status: DISCONTINUED | OUTPATIENT
Start: 2017-01-01 | End: 2017-01-01

## 2017-01-01 RX ORDER — MORPHINE SULFATE 50 MG/1
1 CAPSULE, EXTENDED RELEASE ORAL EVERY 6 HOURS
Qty: 0 | Refills: 0 | Status: DISCONTINUED | OUTPATIENT
Start: 2017-01-01 | End: 2017-01-01

## 2017-01-01 RX ORDER — TUBERCULIN PURIFIED PROTEIN DERIVATIVE 5 [IU]/.1ML
5 INJECTION, SOLUTION INTRADERMAL ONCE
Qty: 0 | Refills: 0 | Status: COMPLETED | OUTPATIENT
Start: 2017-01-01 | End: 2017-01-01

## 2017-01-01 RX ORDER — ONDANSETRON 8 MG/1
4 TABLET, FILM COATED ORAL
Qty: 0 | Refills: 0 | COMMUNITY
Start: 2017-01-01

## 2017-01-01 RX ORDER — POTASSIUM CHLORIDE 20 MEQ
10 PACKET (EA) ORAL ONCE
Qty: 0 | Refills: 0 | Status: COMPLETED | OUTPATIENT
Start: 2017-01-01 | End: 2017-01-01

## 2017-01-01 RX ORDER — POTASSIUM CHLORIDE 20 MEQ
40 PACKET (EA) ORAL ONCE
Qty: 0 | Refills: 0 | Status: COMPLETED | OUTPATIENT
Start: 2017-01-01 | End: 2017-01-01

## 2017-01-01 RX ORDER — ENOXAPARIN SODIUM 100 MG/ML
40 INJECTION SUBCUTANEOUS EVERY 24 HOURS
Qty: 0 | Refills: 0 | Status: DISCONTINUED | OUTPATIENT
Start: 2017-01-01 | End: 2017-01-01

## 2017-01-01 RX ORDER — SODIUM CHLORIDE 9 MG/ML
1 INJECTION INTRAMUSCULAR; INTRAVENOUS; SUBCUTANEOUS
Qty: 0 | Refills: 0 | COMMUNITY
Start: 2017-01-01

## 2017-01-01 RX ORDER — ASPIRIN/CALCIUM CARB/MAGNESIUM 324 MG
81 TABLET ORAL DAILY
Qty: 0 | Refills: 0 | Status: DISCONTINUED | OUTPATIENT
Start: 2017-01-01 | End: 2017-01-01

## 2017-01-01 RX ORDER — PHYTONADIONE (VIT K1) 5 MG
5 TABLET ORAL ONCE
Qty: 0 | Refills: 0 | Status: COMPLETED | OUTPATIENT
Start: 2017-01-01 | End: 2017-01-01

## 2017-01-01 RX ORDER — DEXTROSE 50 % IN WATER 50 %
50 SYRINGE (ML) INTRAVENOUS ONCE
Qty: 0 | Refills: 0 | Status: COMPLETED | OUTPATIENT
Start: 2017-01-01 | End: 2017-01-01

## 2017-01-01 RX ORDER — PANTOPRAZOLE SODIUM 20 MG/1
0 TABLET, DELAYED RELEASE ORAL
Qty: 0 | Refills: 0 | COMMUNITY

## 2017-01-01 RX ORDER — PANTOPRAZOLE SODIUM 20 MG/1
40 TABLET, DELAYED RELEASE ORAL DAILY
Qty: 0 | Refills: 0 | Status: DISCONTINUED | OUTPATIENT
Start: 2017-01-01 | End: 2017-01-01

## 2017-01-01 RX ORDER — VASOPRESSIN 20 [USP'U]/ML
0.04 INJECTION INTRAVENOUS
Qty: 100 | Refills: 0 | Status: DISCONTINUED | OUTPATIENT
Start: 2017-01-01 | End: 2017-01-01

## 2017-01-01 RX ORDER — PHYTONADIONE (VIT K1) 5 MG
5 TABLET ORAL ONCE
Qty: 0 | Refills: 0 | Status: DISCONTINUED | OUTPATIENT
Start: 2017-01-01 | End: 2017-01-01

## 2017-01-01 RX ORDER — DOCUSATE SODIUM 100 MG
0 CAPSULE ORAL
Qty: 0 | Refills: 0 | COMMUNITY

## 2017-01-01 RX ORDER — SODIUM CHLORIDE 9 MG/ML
1 INJECTION INTRAMUSCULAR; INTRAVENOUS; SUBCUTANEOUS
Qty: 0 | Refills: 0 | Status: DISCONTINUED | OUTPATIENT
Start: 2017-01-01 | End: 2017-01-01

## 2017-01-01 RX ORDER — GLUCAGON INJECTION, SOLUTION 0.5 MG/.1ML
1 INJECTION, SOLUTION SUBCUTANEOUS ONCE
Qty: 0 | Refills: 0 | Status: DISCONTINUED | OUTPATIENT
Start: 2017-01-01 | End: 2017-01-01

## 2017-01-01 RX ORDER — IPRATROPIUM/ALBUTEROL SULFATE 18-103MCG
3 AEROSOL WITH ADAPTER (GRAM) INHALATION
Qty: 0 | Refills: 0 | COMMUNITY
Start: 2017-01-01

## 2017-01-01 RX ORDER — ENOXAPARIN SODIUM 100 MG/ML
40 INJECTION SUBCUTANEOUS DAILY
Qty: 0 | Refills: 0 | Status: DISCONTINUED | OUTPATIENT
Start: 2017-01-01 | End: 2017-01-01

## 2017-01-01 RX ORDER — POTASSIUM PHOSPHATE, MONOBASIC POTASSIUM PHOSPHATE, DIBASIC 236; 224 MG/ML; MG/ML
15 INJECTION, SOLUTION INTRAVENOUS ONCE
Qty: 0 | Refills: 0 | Status: COMPLETED | OUTPATIENT
Start: 2017-01-01 | End: 2017-01-01

## 2017-01-01 RX ORDER — FAMOTIDINE 10 MG/ML
20 INJECTION INTRAVENOUS ONCE
Qty: 0 | Refills: 0 | Status: COMPLETED | OUTPATIENT
Start: 2017-01-01 | End: 2017-01-01

## 2017-01-01 RX ORDER — SODIUM CHLORIDE 9 MG/ML
1000 INJECTION, SOLUTION INTRAVENOUS
Qty: 0 | Refills: 0 | COMMUNITY
Start: 2017-01-01

## 2017-01-01 RX ORDER — POTASSIUM CHLORIDE 20 MEQ
10 PACKET (EA) ORAL ONCE
Qty: 0 | Refills: 0 | Status: DISCONTINUED | OUTPATIENT
Start: 2017-01-01 | End: 2017-01-01

## 2017-01-01 RX ORDER — MULTIVIT-MIN/FERROUS GLUCONATE 9 MG/15 ML
1 LIQUID (ML) ORAL
Qty: 0 | Refills: 0 | COMMUNITY
Start: 2017-01-01

## 2017-01-01 RX ORDER — SODIUM,POTASSIUM PHOSPHATES 278-250MG
1 POWDER IN PACKET (EA) ORAL
Qty: 0 | Refills: 0 | Status: COMPLETED | OUTPATIENT
Start: 2017-01-01 | End: 2017-01-01

## 2017-01-01 RX ORDER — LIDOCAINE 4 G/100G
10 CREAM TOPICAL ONCE
Qty: 0 | Refills: 0 | Status: COMPLETED | OUTPATIENT
Start: 2017-01-01 | End: 2017-01-01

## 2017-01-01 RX ORDER — IPRATROPIUM/ALBUTEROL SULFATE 18-103MCG
3 AEROSOL WITH ADAPTER (GRAM) INHALATION EVERY 6 HOURS
Qty: 0 | Refills: 0 | Status: DISCONTINUED | OUTPATIENT
Start: 2017-01-01 | End: 2017-01-01

## 2017-01-01 RX ORDER — METRONIDAZOLE 500 MG
0 TABLET ORAL
Qty: 0 | Refills: 0 | COMMUNITY

## 2017-01-01 RX ORDER — POTASSIUM CHLORIDE 20 MEQ
20 PACKET (EA) ORAL
Qty: 0 | Refills: 0 | Status: COMPLETED | OUTPATIENT
Start: 2017-01-01 | End: 2017-01-01

## 2017-01-01 RX ORDER — LOPERAMIDE HCL 2 MG
0 TABLET ORAL
Qty: 0 | Refills: 0 | COMMUNITY

## 2017-01-01 RX ORDER — THIAMINE MONONITRATE (VIT B1) 100 MG
1 TABLET ORAL
Qty: 0 | Refills: 0 | COMMUNITY
Start: 2017-01-01

## 2017-01-01 RX ORDER — FLUCONAZOLE 150 MG/1
200 TABLET ORAL ONCE
Qty: 0 | Refills: 0 | Status: COMPLETED | OUTPATIENT
Start: 2017-01-01 | End: 2017-01-01

## 2017-01-01 RX ORDER — THIAMINE MONONITRATE (VIT B1) 100 MG
500 TABLET ORAL DAILY
Qty: 0 | Refills: 0 | Status: DISCONTINUED | OUTPATIENT
Start: 2017-01-01 | End: 2017-01-01

## 2017-01-01 RX ORDER — DEXTROSE 50 % IN WATER 50 %
12.5 SYRINGE (ML) INTRAVENOUS ONCE
Qty: 0 | Refills: 0 | Status: DISCONTINUED | OUTPATIENT
Start: 2017-01-01 | End: 2017-01-01

## 2017-01-01 RX ORDER — PROTHROMBIN COMPLEX CONCENTRATE (HUMAN) 25.5; 16.5; 24; 22; 22; 26 [IU]/ML; [IU]/ML; [IU]/ML; [IU]/ML; [IU]/ML; [IU]/ML
1500 POWDER, FOR SOLUTION INTRAVENOUS ONCE
Qty: 0 | Refills: 0 | Status: COMPLETED | OUTPATIENT
Start: 2017-01-01 | End: 2017-01-01

## 2017-01-01 RX ORDER — SODIUM,POTASSIUM PHOSPHATES 278-250MG
1 POWDER IN PACKET (EA) ORAL
Qty: 0 | Refills: 0 | Status: DISCONTINUED | OUTPATIENT
Start: 2017-01-01 | End: 2017-01-01

## 2017-01-01 RX ORDER — ASPIRIN/CALCIUM CARB/MAGNESIUM 324 MG
1 TABLET ORAL
Qty: 0 | Refills: 0 | COMMUNITY
Start: 2017-01-01

## 2017-01-01 RX ORDER — FLUCONAZOLE 150 MG/1
100 TABLET ORAL DAILY
Qty: 0 | Refills: 0 | Status: DISCONTINUED | OUTPATIENT
Start: 2017-01-01 | End: 2017-01-01

## 2017-01-01 RX ORDER — THIAMINE MONONITRATE (VIT B1) 100 MG
100 TABLET ORAL DAILY
Qty: 0 | Refills: 0 | Status: DISCONTINUED | OUTPATIENT
Start: 2017-01-01 | End: 2017-01-01

## 2017-01-01 RX ORDER — SODIUM CHLORIDE 9 MG/ML
1 INJECTION INTRAMUSCULAR; INTRAVENOUS; SUBCUTANEOUS DAILY
Qty: 0 | Refills: 0 | Status: DISCONTINUED | OUTPATIENT
Start: 2017-01-01 | End: 2017-01-01

## 2017-01-01 RX ORDER — INFLUENZA VIRUS VACCINE 15; 15; 15; 15 UG/.5ML; UG/.5ML; UG/.5ML; UG/.5ML
0.5 SUSPENSION INTRAMUSCULAR ONCE
Qty: 0 | Refills: 0 | Status: DISCONTINUED | OUTPATIENT
Start: 2017-01-01 | End: 2017-01-01

## 2017-01-01 RX ORDER — SODIUM CHLORIDE 9 MG/ML
1000 INJECTION, SOLUTION INTRAVENOUS ONCE
Qty: 0 | Refills: 0 | Status: COMPLETED | OUTPATIENT
Start: 2017-01-01 | End: 2017-01-01

## 2017-01-01 RX ORDER — PHYTONADIONE (VIT K1) 5 MG
10 TABLET ORAL ONCE
Qty: 0 | Refills: 0 | Status: COMPLETED | OUTPATIENT
Start: 2017-01-01 | End: 2017-01-01

## 2017-01-01 RX ORDER — SIMETHICONE 80 MG/1
1 TABLET, CHEWABLE ORAL
Qty: 0 | Refills: 0 | COMMUNITY
Start: 2017-01-01

## 2017-01-01 RX ORDER — ONDANSETRON 8 MG/1
4 TABLET, FILM COATED ORAL EVERY 8 HOURS
Qty: 0 | Refills: 0 | Status: DISCONTINUED | OUTPATIENT
Start: 2017-01-01 | End: 2017-01-01

## 2017-01-01 RX ORDER — MORPHINE SULFATE 50 MG/1
2 CAPSULE, EXTENDED RELEASE ORAL
Qty: 0 | Refills: 0 | COMMUNITY
Start: 2017-01-01

## 2017-01-01 RX ORDER — FENTANYL CITRATE 50 UG/ML
4 INJECTION INTRAVENOUS
Qty: 2500 | Refills: 0 | Status: DISCONTINUED | OUTPATIENT
Start: 2017-01-01 | End: 2017-01-01

## 2017-01-01 RX ORDER — SODIUM CHLORIDE 9 MG/ML
3 INJECTION INTRAMUSCULAR; INTRAVENOUS; SUBCUTANEOUS ONCE
Qty: 0 | Refills: 0 | Status: COMPLETED | OUTPATIENT
Start: 2017-01-01 | End: 2017-01-01

## 2017-01-01 RX ORDER — VASOPRESSIN 20 [USP'U]/ML
0.01 INJECTION INTRAVENOUS
Qty: 100 | Refills: 0 | Status: DISCONTINUED | OUTPATIENT
Start: 2017-01-01 | End: 2017-01-01

## 2017-01-01 RX ORDER — SIMETHICONE 80 MG/1
80 TABLET, CHEWABLE ORAL DAILY
Qty: 0 | Refills: 0 | Status: DISCONTINUED | OUTPATIENT
Start: 2017-01-01 | End: 2017-01-01

## 2017-01-01 RX ORDER — ONDANSETRON 8 MG/1
4 TABLET, FILM COATED ORAL ONCE
Qty: 0 | Refills: 0 | Status: COMPLETED | OUTPATIENT
Start: 2017-01-01 | End: 2017-01-01

## 2017-01-01 RX ORDER — POTASSIUM CHLORIDE 20 MEQ
40 PACKET (EA) ORAL EVERY 4 HOURS
Qty: 0 | Refills: 0 | Status: COMPLETED | OUTPATIENT
Start: 2017-01-01 | End: 2017-01-01

## 2017-01-01 RX ORDER — MULTIVIT-MIN/FERROUS GLUCONATE 9 MG/15 ML
1 LIQUID (ML) ORAL DAILY
Qty: 0 | Refills: 0 | Status: DISCONTINUED | OUTPATIENT
Start: 2017-01-01 | End: 2017-01-01

## 2017-01-01 RX ORDER — DEXTROSE MONOHYDRATE, SODIUM CHLORIDE, AND POTASSIUM CHLORIDE 50; .745; 4.5 G/1000ML; G/1000ML; G/1000ML
1000 INJECTION, SOLUTION INTRAVENOUS
Qty: 0 | Refills: 0 | COMMUNITY
Start: 2017-01-01

## 2017-01-01 RX ORDER — PANTOPRAZOLE SODIUM 20 MG/1
40 TABLET, DELAYED RELEASE ORAL ONCE
Qty: 0 | Refills: 0 | Status: COMPLETED | OUTPATIENT
Start: 2017-01-01 | End: 2017-01-01

## 2017-01-01 RX ORDER — ENOXAPARIN SODIUM 100 MG/ML
40 INJECTION SUBCUTANEOUS
Qty: 0 | Refills: 0 | COMMUNITY
Start: 2017-01-01

## 2017-01-01 RX ORDER — DEXTROSE 10 % IN WATER 10 %
1000 INTRAVENOUS SOLUTION INTRAVENOUS
Qty: 0 | Refills: 0 | Status: DISCONTINUED | OUTPATIENT
Start: 2017-01-01 | End: 2017-01-01

## 2017-01-01 RX ORDER — KETOROLAC TROMETHAMINE 30 MG/ML
15 SYRINGE (ML) INJECTION ONCE
Qty: 0 | Refills: 0 | Status: DISCONTINUED | OUTPATIENT
Start: 2017-01-01 | End: 2017-01-01

## 2017-01-01 RX ORDER — ASPIRIN/CALCIUM CARB/MAGNESIUM 324 MG
1 TABLET ORAL
Qty: 30 | Refills: 0 | OUTPATIENT
Start: 2017-01-01 | End: 2017-11-25

## 2017-01-01 RX ADMIN — Medication 1 TABLET(S): at 12:35

## 2017-01-01 RX ADMIN — VASOPRESSIN 0.6 UNIT(S)/MIN: 20 INJECTION INTRAVENOUS at 15:46

## 2017-01-01 RX ADMIN — Medication 1 TABLET(S): at 17:05

## 2017-01-01 RX ADMIN — Medication 200 MILLILITER(S): at 08:24

## 2017-01-01 RX ADMIN — PANTOPRAZOLE SODIUM 40 MILLIGRAM(S): 20 TABLET, DELAYED RELEASE ORAL at 11:55

## 2017-01-01 RX ADMIN — FLUCONAZOLE 200 MILLIGRAM(S): 150 TABLET ORAL at 17:18

## 2017-01-01 RX ADMIN — PANTOPRAZOLE SODIUM 40 MILLIGRAM(S): 20 TABLET, DELAYED RELEASE ORAL at 13:04

## 2017-01-01 RX ADMIN — Medication 2 MILLIGRAM(S): at 08:22

## 2017-01-01 RX ADMIN — FAMOTIDINE 20 MILLIGRAM(S): 10 INJECTION INTRAVENOUS at 16:27

## 2017-01-01 RX ADMIN — SODIUM CHLORIDE 1000 MILLILITER(S): 9 INJECTION INTRAMUSCULAR; INTRAVENOUS; SUBCUTANEOUS at 16:28

## 2017-01-01 RX ADMIN — FENTANYL CITRATE 4.63 MICROGRAM(S)/KG/HR: 50 INJECTION INTRAVENOUS at 17:30

## 2017-01-01 RX ADMIN — Medication 20 MILLIEQUIVALENT(S): at 12:47

## 2017-01-01 RX ADMIN — Medication 50 MILLILITER(S): at 16:14

## 2017-01-01 RX ADMIN — Medication 1 TABLET(S): at 22:40

## 2017-01-01 RX ADMIN — TUBERCULIN PURIFIED PROTEIN DERIVATIVE 5 UNIT(S): 5 INJECTION, SOLUTION INTRADERMAL at 17:50

## 2017-01-01 RX ADMIN — Medication 50 MILLILITER(S): at 11:57

## 2017-01-01 RX ADMIN — ENOXAPARIN SODIUM 40 MILLIGRAM(S): 100 INJECTION SUBCUTANEOUS at 15:52

## 2017-01-01 RX ADMIN — DEXTROSE MONOHYDRATE, SODIUM CHLORIDE, AND POTASSIUM CHLORIDE 100 MILLILITER(S): 50; .745; 4.5 INJECTION, SOLUTION INTRAVENOUS at 12:07

## 2017-01-01 RX ADMIN — ONDANSETRON 4 MILLIGRAM(S): 8 TABLET, FILM COATED ORAL at 16:27

## 2017-01-01 RX ADMIN — MORPHINE SULFATE 2 MILLIGRAM(S): 50 CAPSULE, EXTENDED RELEASE ORAL at 01:24

## 2017-01-01 RX ADMIN — SIMETHICONE 80 MILLIGRAM(S): 80 TABLET, CHEWABLE ORAL at 14:28

## 2017-01-01 RX ADMIN — FENTANYL CITRATE 9.26 MICROGRAM(S)/KG/HR: 50 INJECTION INTRAVENOUS at 20:15

## 2017-01-01 RX ADMIN — Medication 40 MILLIEQUIVALENT(S): at 17:05

## 2017-01-01 RX ADMIN — DEXTROSE MONOHYDRATE, SODIUM CHLORIDE, AND POTASSIUM CHLORIDE 100 MILLILITER(S): 50; .745; 4.5 INJECTION, SOLUTION INTRAVENOUS at 01:00

## 2017-01-01 RX ADMIN — DEXTROSE MONOHYDRATE, SODIUM CHLORIDE, AND POTASSIUM CHLORIDE 100 MILLILITER(S): 50; .745; 4.5 INJECTION, SOLUTION INTRAVENOUS at 22:20

## 2017-01-01 RX ADMIN — Medication 15 MILLIGRAM(S): at 18:34

## 2017-01-01 RX ADMIN — MORPHINE SULFATE 1 MILLIGRAM(S): 50 CAPSULE, EXTENDED RELEASE ORAL at 14:33

## 2017-01-01 RX ADMIN — Medication 100 MILLIEQUIVALENT(S): at 05:15

## 2017-01-01 RX ADMIN — DEXTROSE MONOHYDRATE, SODIUM CHLORIDE, AND POTASSIUM CHLORIDE 100 MILLILITER(S): 50; .745; 4.5 INJECTION, SOLUTION INTRAVENOUS at 06:59

## 2017-01-01 RX ADMIN — FLUCONAZOLE 100 MILLIGRAM(S): 150 TABLET ORAL at 12:11

## 2017-01-01 RX ADMIN — Medication 20 MILLIEQUIVALENT(S): at 08:55

## 2017-01-01 RX ADMIN — Medication 40 MILLIEQUIVALENT(S): at 20:34

## 2017-01-01 RX ADMIN — Medication 3 MILLILITER(S): at 14:43

## 2017-01-01 RX ADMIN — SODIUM CHLORIDE 2000 MILLILITER(S): 9 INJECTION, SOLUTION INTRAVENOUS at 08:01

## 2017-01-01 RX ADMIN — LIDOCAINE 10 MILLILITER(S): 4 CREAM TOPICAL at 16:27

## 2017-01-01 RX ADMIN — Medication 1 TABLET(S): at 15:52

## 2017-01-01 RX ADMIN — DEXTROSE MONOHYDRATE, SODIUM CHLORIDE, AND POTASSIUM CHLORIDE 100 MILLILITER(S): 50; .745; 4.5 INJECTION, SOLUTION INTRAVENOUS at 17:26

## 2017-01-01 RX ADMIN — Medication 200 MILLILITER(S): at 17:15

## 2017-01-01 RX ADMIN — FLUCONAZOLE 100 MILLIGRAM(S): 150 TABLET ORAL at 12:35

## 2017-01-01 RX ADMIN — PANTOPRAZOLE SODIUM 40 MILLIGRAM(S): 20 TABLET, DELAYED RELEASE ORAL at 01:24

## 2017-01-01 RX ADMIN — SIMETHICONE 80 MILLIGRAM(S): 80 TABLET, CHEWABLE ORAL at 23:43

## 2017-01-01 RX ADMIN — FLUCONAZOLE 100 MILLIGRAM(S): 150 TABLET ORAL at 11:55

## 2017-01-01 RX ADMIN — ENOXAPARIN SODIUM 40 MILLIGRAM(S): 100 INJECTION SUBCUTANEOUS at 12:11

## 2017-01-01 RX ADMIN — Medication 25 GRAM(S): at 06:57

## 2017-01-01 RX ADMIN — FENTANYL CITRATE 4 MICROGRAM(S)/KG/HR: 50 INJECTION INTRAVENOUS at 20:30

## 2017-01-01 RX ADMIN — SODIUM CHLORIDE 100 MILLILITER(S): 9 INJECTION, SOLUTION INTRAVENOUS at 22:45

## 2017-01-01 RX ADMIN — PANTOPRAZOLE SODIUM 40 MILLIGRAM(S): 20 TABLET, DELAYED RELEASE ORAL at 12:00

## 2017-01-01 RX ADMIN — PANTOPRAZOLE SODIUM 40 MILLIGRAM(S): 20 TABLET, DELAYED RELEASE ORAL at 13:40

## 2017-01-01 RX ADMIN — PANTOPRAZOLE SODIUM 40 MILLIGRAM(S): 20 TABLET, DELAYED RELEASE ORAL at 12:34

## 2017-01-01 RX ADMIN — ENOXAPARIN SODIUM 40 MILLIGRAM(S): 100 INJECTION SUBCUTANEOUS at 13:40

## 2017-01-01 RX ADMIN — DEXTROSE MONOHYDRATE, SODIUM CHLORIDE, AND POTASSIUM CHLORIDE 60 MILLILITER(S): 50; .745; 4.5 INJECTION, SOLUTION INTRAVENOUS at 22:40

## 2017-01-01 RX ADMIN — ENOXAPARIN SODIUM 40 MILLIGRAM(S): 100 INJECTION SUBCUTANEOUS at 22:45

## 2017-01-01 RX ADMIN — Medication 5 MILLIGRAM(S): at 03:05

## 2017-01-01 RX ADMIN — Medication 1 TABLET(S): at 23:49

## 2017-01-01 RX ADMIN — Medication 10 MILLIGRAM(S): at 06:01

## 2017-01-01 RX ADMIN — Medication 8.68 MICROGRAM(S)/KG/MIN: at 20:15

## 2017-01-01 RX ADMIN — Medication 3 MILLILITER(S): at 09:46

## 2017-01-01 RX ADMIN — Medication 12.5 GRAM(S): at 08:00

## 2017-01-01 RX ADMIN — ENOXAPARIN SODIUM 40 MILLIGRAM(S): 100 INJECTION SUBCUTANEOUS at 12:46

## 2017-01-01 RX ADMIN — PROTHROMBIN COMPLEX CONCENTRATE (HUMAN) 400 INTERNATIONAL UNIT(S): 25.5; 16.5; 24; 22; 22; 26 POWDER, FOR SOLUTION INTRAVENOUS at 10:04

## 2017-01-01 RX ADMIN — VASOPRESSIN 2.4 UNIT(S)/MIN: 20 INJECTION INTRAVENOUS at 20:15

## 2017-01-01 RX ADMIN — Medication 2 MILLIGRAM(S): at 09:12

## 2017-01-01 RX ADMIN — Medication 20 MILLIEQUIVALENT(S): at 16:46

## 2017-01-01 RX ADMIN — SODIUM CHLORIDE 3000 MILLILITER(S): 9 INJECTION INTRAMUSCULAR; INTRAVENOUS; SUBCUTANEOUS at 01:24

## 2017-01-01 RX ADMIN — Medication 20 MILLIEQUIVALENT(S): at 14:52

## 2017-01-01 RX ADMIN — FENTANYL CITRATE 4 MICROGRAM(S)/KG/HR: 50 INJECTION INTRAVENOUS at 18:00

## 2017-01-01 RX ADMIN — POTASSIUM PHOSPHATE, MONOBASIC POTASSIUM PHOSPHATE, DIBASIC 62.5 MILLIMOLE(S): 236; 224 INJECTION, SOLUTION INTRAVENOUS at 12:01

## 2017-01-01 RX ADMIN — Medication 1 TABLET(S): at 11:54

## 2017-01-01 RX ADMIN — MORPHINE SULFATE 2 MILLIGRAM(S): 50 CAPSULE, EXTENDED RELEASE ORAL at 01:25

## 2017-01-01 RX ADMIN — Medication 20 MILLIEQUIVALENT(S): at 12:01

## 2017-01-01 RX ADMIN — Medication 50 MILLILITER(S): at 09:50

## 2017-01-01 RX ADMIN — Medication 50 MILLILITER(S): at 17:18

## 2017-01-01 RX ADMIN — Medication 100 MILLIEQUIVALENT(S): at 10:04

## 2017-01-01 RX ADMIN — Medication 30 MILLILITER(S): at 16:27

## 2017-01-01 RX ADMIN — MORPHINE SULFATE 1 MILLIGRAM(S): 50 CAPSULE, EXTENDED RELEASE ORAL at 01:20

## 2017-01-01 RX ADMIN — FENTANYL CITRATE 9.26 MICROGRAM(S)/KG/HR: 50 INJECTION INTRAVENOUS at 08:24

## 2017-01-01 RX ADMIN — DEXTROSE MONOHYDRATE, SODIUM CHLORIDE, AND POTASSIUM CHLORIDE 75 MILLILITER(S): 50; .745; 4.5 INJECTION, SOLUTION INTRAVENOUS at 05:56

## 2017-01-01 RX ADMIN — DEXTROSE MONOHYDRATE, SODIUM CHLORIDE, AND POTASSIUM CHLORIDE 75 MILLILITER(S): 50; .745; 4.5 INJECTION, SOLUTION INTRAVENOUS at 18:35

## 2017-01-01 RX ADMIN — SODIUM CHLORIDE 3000 MILLILITER(S): 9 INJECTION INTRAMUSCULAR; INTRAVENOUS; SUBCUTANEOUS at 04:16

## 2017-01-01 RX ADMIN — DEXTROSE MONOHYDRATE, SODIUM CHLORIDE, AND POTASSIUM CHLORIDE 60 MILLILITER(S): 50; .745; 4.5 INJECTION, SOLUTION INTRAVENOUS at 12:00

## 2017-01-01 RX ADMIN — POTASSIUM PHOSPHATE, MONOBASIC POTASSIUM PHOSPHATE, DIBASIC 62.5 MILLIMOLE(S): 236; 224 INJECTION, SOLUTION INTRAVENOUS at 09:31

## 2017-01-01 RX ADMIN — PANTOPRAZOLE SODIUM 40 MILLIGRAM(S): 20 TABLET, DELAYED RELEASE ORAL at 12:11

## 2017-01-01 RX ADMIN — DEXTROSE MONOHYDRATE, SODIUM CHLORIDE, AND POTASSIUM CHLORIDE 60 MILLILITER(S): 50; .745; 4.5 INJECTION, SOLUTION INTRAVENOUS at 17:48

## 2017-01-01 RX ADMIN — Medication 40 MILLIEQUIVALENT(S): at 13:40

## 2017-01-01 RX ADMIN — ENOXAPARIN SODIUM 40 MILLIGRAM(S): 100 INJECTION SUBCUTANEOUS at 11:55

## 2017-01-01 RX ADMIN — Medication 2 MILLIGRAM(S): at 19:15

## 2017-01-01 RX ADMIN — SODIUM CHLORIDE 100 MILLILITER(S): 9 INJECTION, SOLUTION INTRAVENOUS at 18:49

## 2017-01-01 RX ADMIN — PANTOPRAZOLE SODIUM 40 MILLIGRAM(S): 20 TABLET, DELAYED RELEASE ORAL at 14:27

## 2017-01-01 RX ADMIN — FLUCONAZOLE 100 MILLIGRAM(S): 150 TABLET ORAL at 12:00

## 2017-01-01 RX ADMIN — SODIUM CHLORIDE 1 GRAM(S): 9 INJECTION INTRAMUSCULAR; INTRAVENOUS; SUBCUTANEOUS at 12:35

## 2017-01-01 RX ADMIN — Medication 25 GRAM(S): at 06:38

## 2017-01-01 RX ADMIN — PANTOPRAZOLE SODIUM 40 MILLIGRAM(S): 20 TABLET, DELAYED RELEASE ORAL at 16:50

## 2017-01-01 RX ADMIN — Medication 200 MILLILITER(S): at 20:15

## 2017-01-01 RX ADMIN — Medication 15 MILLIGRAM(S): at 19:30

## 2017-01-01 RX ADMIN — FLUCONAZOLE 100 MILLIGRAM(S): 150 TABLET ORAL at 12:46

## 2017-01-01 RX ADMIN — Medication 8.68 MICROGRAM(S)/KG/MIN: at 16:46

## 2017-01-01 RX ADMIN — SODIUM CHLORIDE 3 MILLILITER(S): 9 INJECTION INTRAMUSCULAR; INTRAVENOUS; SUBCUTANEOUS at 01:25

## 2017-01-01 RX ADMIN — Medication 20 MILLIEQUIVALENT(S): at 14:05

## 2017-01-01 RX ADMIN — ENOXAPARIN SODIUM 40 MILLIGRAM(S): 100 INJECTION SUBCUTANEOUS at 12:01

## 2017-01-01 RX ADMIN — Medication 101 MILLIGRAM(S): at 23:59

## 2017-01-01 RX ADMIN — Medication 1 TABLET(S): at 08:47

## 2017-01-01 RX ADMIN — Medication 25 MILLILITER(S): at 13:30

## 2017-01-01 RX ADMIN — DEXTROSE MONOHYDRATE, SODIUM CHLORIDE, AND POTASSIUM CHLORIDE 125 MILLILITER(S): 50; .745; 4.5 INJECTION, SOLUTION INTRAVENOUS at 11:55

## 2017-01-01 RX ADMIN — MORPHINE SULFATE 1 MILLIGRAM(S): 50 CAPSULE, EXTENDED RELEASE ORAL at 01:06

## 2017-09-08 NOTE — ED PROVIDER NOTE - ATTENDING CONTRIBUTION TO CARE
Patient with chronic abdominal pain, intermittent intensity over last year, having worsening pain today.  No persisting trigger that he has noted.  On protonix and ciproheptadine without relief.  Reporting outpatient CT (results unknown), also with outpatient EGD/colonoscopy that just showed "inflammation".  Reporting associated weight loss.    On exam patient vital signs within normal limits, non toxic appearing, thin, RRR S1/S2, lungs clear to ascultation bilaterally, abdomen soft, non tender, non distended.    DDx includes chronic gastritis, chronic pancreatitis, PUD, gastric/GI cancer, no evidence of acute surgical process by exam, plan for labs, CT A/P for possible mass and re-evaluate.

## 2017-09-08 NOTE — ED ADULT NURSE NOTE - OBJECTIVE STATEMENT
61 year old ambulatory male presents to ED c/o losing weight x1year unintentional and abd pain x 1 year. Patient states he has no PMH and went to his PMD who scanned him and did blood work of which he has no results other than a diagnosis of acid reflux. Patient states he is a 10 cigarettes per day for 42 years. Lung sounds ronchi bilaterally. Abd nondistended slightly tender to epigastric region. Skin warm and dry. In no acute distress.

## 2017-09-08 NOTE — ED PROVIDER NOTE - OBJECTIVE STATEMENT
61 year old male, hx of inguinal hernia in past, acid reflux, presents to the ED for abdominal pain. Reports periumbilical abdominal pain, non-radiating, intermittent, sharp, no aggravating or relieving factors. . Reports decreased PO intake. Reports anorexia, reports 60 pound weight loss in past year. No nausea or vomiting. Reports non bloody diarrhea over past year multiple episodes per day but no bowel movement in 3-4 days. Passing flatulence. No fevers  or chills. No chest pain or shortness of breath. No sick contacts or recent travel.     PCP: Dr. Samanta Strange

## 2017-09-08 NOTE — ED PROVIDER NOTE - CARE PLAN
Instructions for follow-up, activity and diet:	1) Follow up with GI in 1-2 days call [phone] for an appointment   2) You were given a copy of your results, please show them to your doctor for review.   3) Also, Please follow up with your primary medical doctor in 1-2 days for reevaluation. If you do not have pmd please call the general medicine clinic for an appointment at [phone].   4) Return to the ED for worsening pain, nausea, vomiting, fever greater than 100.4, chest pain, shortness of breath, diarrhea, constipation, blood in the stool, or if you have any other new, worsening, or concerning symptoms. Principal Discharge DX:	Abdominal pain  Instructions for follow-up, activity and diet:	1) Follow up with GI in 1-2 days call [phone] for an appointment   2) You were given a copy of your results, please show them to your doctor for review.   3) Also, Please follow up with your primary medical doctor in 1-2 days for reevaluation. If you do not have pmd please call the general medicine clinic for an appointment at [phone].   4) Return to the ED for worsening pain, nausea, vomiting, fever greater than 100.4, chest pain, shortness of breath, diarrhea, constipation, blood in the stool, or if you have any other new, worsening, or concerning symptoms.

## 2017-09-08 NOTE — ED PROVIDER NOTE - PLAN OF CARE
1) Follow up with GI in 1-2 days call [phone] for an appointment   2) You were given a copy of your results, please show them to your doctor for review.   3) Also, Please follow up with your primary medical doctor in 1-2 days for reevaluation. If you do not have pmd please call the general medicine clinic for an appointment at [phone].   4) Return to the ED for worsening pain, nausea, vomiting, fever greater than 100.4, chest pain, shortness of breath, diarrhea, constipation, blood in the stool, or if you have any other new, worsening, or concerning symptoms.

## 2017-09-08 NOTE — ED PROVIDER NOTE - MEDICAL DECISION MAKING DETAILS
61 year old male, hx of inguinal hernia in past, acid reflux, presents to the ED for abdominal pain. Minimal epigastric suprapubic tenderness. Could be GED vs ulcer, but given significant weight loss eval for cancer. Will obtain labwork and ct. pain control.

## 2017-09-08 NOTE — ED PROVIDER NOTE - PROGRESS NOTE DETAILS
feels improved, CT with enteritis, patient to follow up with GI, ok for discharge, strict return precautions,

## 2017-10-19 NOTE — CONSULT NOTE ADULT - ATTENDING COMMENTS
[facility] NEPHROLOGY  Bob Peñaloza M.D.  Bandar Angel D.O.  Matilde Robbins M.D.  Darlyn Luo, MSN, ANP-C  [phone]    [id number] [place] [address]

## 2017-10-19 NOTE — H&P ADULT - NSHPREVIEWOFSYSTEMS_GEN_ALL_CORE
REVIEW OF SYSTEMS:    CONSTITUTIONAL: No fevers or chills. very cachetic looking, weak and unable to ambulate due to generalized weakness.     RESPIRATORY: No cough, wheezing, hemoptysis; No shortness of breath    CARDIOVASCULAR: No chest pain or palpitations    GASTROINTESTINAL: Chronic Abdominal pain, nausea, vomiting and diarrhea as mentioned above.     NEUROLOGICAL: generalized weakness and feeling heavy to move around from weakness.     All other review of systems is negative unless indicated above.

## 2017-10-19 NOTE — H&P ADULT - PROBLEM SELECTOR PLAN 1
Likely from GI malignancy as associated with nausea, vomiting, weight loss and loss of appetite.   DDx -IBD as patient stated that EGD and colonoscopy with inflammation in GI tract.   CT abd with no acute pathology.   Unable to reach wife for medication history, HIPPA form faxed to [facility] at . Please follow up with patient medical record.   IVF as appropriate to patient electrolyte status with BMP.   Liquid diet, GI-Dr Holden. Plan for EDG/ ?colonoscopy tomorrow.

## 2017-10-19 NOTE — H&P ADULT - ASSESSMENT
61 year old male from home with no significant PMH presented with chronic abdominal pain, nausea, vomiting and diarrhea for 7 months since Feb 2017.

## 2017-10-19 NOTE — ED PROVIDER NOTE - MEDICAL DECISION MAKING DETAILS
60 y/o M pt with recurrent nausea, vomiting, diarrhea, and with severe weight loss and recent hospitalization. Will obtain labs, CT abd, EKG and reassess.

## 2017-10-19 NOTE — ED PROVIDER NOTE - OBJECTIVE STATEMENT
62 y/o M pt with no significant PMHx presents to ED c/o abd pain. Pt reports for the past month he has recurrent nausea, vomiting, diarrhea and abd pain and has been seen in [facility] for evaluation for complaint. Pt states he was admitted for 1 day and was discharge 2 days ago. Pt notes that sx recurred today therefore visits today for further evaluation. As per pt, pt has lost 70-80 lbs in the last year. Pt denies fever, chills, shortness of breath, cough, chest pain, palpitations, dysuria, urinary frequency, hematuria, recent outside [place] travels, sick contacts or known spoiled food consumption, or any other complaints. NKDA.

## 2017-10-19 NOTE — H&P ADULT - PROBLEM SELECTOR PLAN 2
Likely from chronic diarrhea, concern for malignancy.   EGD colonoscopy tomorrow with Dr Holden as above.  Concern for TB as patient from [facility], f/u [facility] and CT chest without contrast.

## 2017-10-19 NOTE — CONSULT NOTE ADULT - PROBLEM SELECTOR RECOMMENDATION 5
with 70 lb weightloss/ decreased PO intake.   r/o malignancy. ?insulinoma vs, gastric pathology, check C pepetide   consider EGD  Management as per primary team. with 70 lb weightloss/ decreased PO intake. HIV neg.    Check stool for H. Pylori  r/o malignancy. ?insulinoma vs, gastric pathology, check C pepetide   consider EGD  Management as per primary team.

## 2017-10-19 NOTE — ED ADULT NURSE REASSESSMENT NOTE - NS ED NURSE REASSESS COMMENT FT1
Hypoglycemia reported from lab Dex 50%/50cc as per MAR order Pt seen and evaluated by Dr Osmin Davies continue monitoring safety maintained
Pt alert and verbally responsive,  due meds given reputed BMP done admitted to 4S hand off report given to Barbara SAHU transferred via stretcher safety maintained
Pt noted cachectic  alert and oriented x1, VS WNL confused and become restless at times MAR Rayo called and made aware IV Potasium in progress
Pt  become more alert and cooperative VS WNL denies having abd pain or diarrhea continue monitoring

## 2017-10-19 NOTE — H&P ADULT - NSHPPHYSICALEXAM_GEN_ALL_CORE
Physical Exam    General: Anorexic and cachetic looking patient.     Neurology: A&Ox3, generalized weakness but no focal neurological deficit.     Respiratory: CTA B/L, no wheezes, no rhonchi, no rales    CV: RRR, S1S2, no murmur    Abdominal: Scaphoid abdomen with bowel sound positive, non tender on exam, No rebound, guarding or rigidity.     Genital exam; within normal limit with no discharge or no scrotal swelling seen.     Digital Rectal Exam: Multiple External hemorrhoid with no evident of bleeding. No stool in rectum. No fistula, fissure, ulcer evident on exam. No sign of constipation.      MSK: No edema, + peripheral pulses    No other pertinent positive finding on physical exam except mentioned above.

## 2017-10-19 NOTE — H&P ADULT - PROBLEM SELECTOR PLAN 3
Hyponatremia, Hypokalemia and Hypoglycemia  Concern for adrenal insufficiency.   f/u random cortisol level.   Hyponatremia 121 on admission corrected to 130 in 9 hour.   Will hold on IVF for now. f/u BMP Q4 for now.  Dr Lopez

## 2017-10-19 NOTE — ED PROVIDER NOTE - PROGRESS NOTE DETAILS
labs show Na 121-given 2L NS, K 3.3-given IV repletion, CR 0.3  UA neg for uti, CT abd unremarkable  repeat temp 97 on bear hugger  patient stable for admission

## 2017-10-19 NOTE — ED PROVIDER NOTE - CHPI ED SYMPTOMS NEG
no fever, no chills, no shortness of breath, no cough, no chest pain, no palpitations, no dysuria, no urinary frequency, no hematuria

## 2017-10-19 NOTE — CONSULT NOTE ADULT - SUBJECTIVE AND OBJECTIVE BOX
Patient is a 61y old  Male who presents with a chief complaint of abdominal pain, nausea, vomiting and diarrhea (19 Oct 2017 12:21)      HPI:  61 year old male from home with no significant PMH presented with chronic abdominal pain, nausea, vomiting and diarrhea. Patient stated that he started having pain in his stomach for 7 months with no relationship to food. He had loss of appetite and loss of weight for 70 lb over this 7 months. The pain is also associated with nausea, vomiting and diarrhea. Patient stated that sometimes he has diarrhea about 8 times per days. he went to [facility] and had EGD and colonoscopy and was tole he had inflammation in his gastrointestinal tract.  Also patient was evaluated in September at [facility]. Patient also stated that he has some obstruction causing him constipation and sometime he had dark bowel movement. Patient denied having any other symptom such as chest pain, cough, palpitation or any urinary habit change. Patient was originally from [facility] but denied recent travel or diet change. Patient is smoker and use alcohol before with risk for malignancy. (19 Oct 2017 12:21)pt admits to having hypoglycemic sx for the last 2 wks. + postural dizziness      PAST MEDICAL & SURGICAL HISTORY:  Chronic diarrhea  Anorexia  Cachexia  Acid reflux  No significant past surgical history         MEDICATIONS  (STANDING):  dextrose 50% Injectable 25 milliLiter(s) IV Push once  dextrose 50% Injectable 50 milliLiter(s) IV Push once  enoxaparin Injectable 40 milliGRAM(s) SubCutaneous daily  pantoprazole  Injectable 40 milliGRAM(s) IV Push daily  potassium chloride    Tablet ER 40 milliEquivalent(s) Oral every 4 hours    MEDICATIONS  (PRN):  ondansetron Injectable 4 milliGRAM(s) IV Push every 8 hours PRN Nausea and/or Vomiting      FAMILY HISTORY:  No pertinent family history in first degree relatives      SOCIAL HISTORY:      REVIEW OF SYSTEMS:  CONSTITUTIONAL: No fever, weight loss, or fatigue  EYES: No eye pain, visual disturbances, or discharge  ENT:  No difficulty hearing, tinnitus, vertigo; No sinus or throat pain  NECK: No pain or stiffness  RESPIRATORY: No cough, wheezing, chills or hemoptysis; No Shortness of Breath  CARDIOVASCULAR: No chest pain, palpitations, passing out, dizziness, or leg swelling  GASTROINTESTINAL: No abdominal or epigastric pain. No nausea, vomiting, or hematemesis; No diarrhea or constipation. No melena or hematochezia.  GENITOURINARY: No dysuria, frequency, hematuria, or incontinence  NEUROLOGICAL: No headaches, memory loss, loss of strength, numbness, or tremors  SKIN: No itching, burning, rashes, or lesions   LYMPH Nodes: No enlarged glands  ENDOCRINE: No heat or cold intolerance; No hair loss  MUSCULOSKELETAL: No joint pain or swelling; No muscle, back, or extremity pain  PSYCHIATRIC: No depression, anxiety, mood swings, or difficulty sleeping  HEME/LYMPH: No easy bruising, or bleeding gums  ALLERGY AND IMMUNOLOGIC: No hives or eczema	        Vital Signs Last 24 Hrs  T(C): 34.4 (19 Oct 2017 16:06), Max: 36.4 (19 Oct 2017 07:20)  T(F): 94 (19 Oct 2017 16:06), Max: 97.5 (19 Oct 2017 07:20)  HR: 77 (19 Oct 2017 16:06) (66 - 94)  BP: 114/79 (19 Oct 2017 16:06) (105/75 - 133/88)  BP(mean): --  RR: 14 (19 Oct 2017 16:06) (14 - 18)  SpO2: 100% (19 Oct 2017 16:06) (97% - 100%)      Constitutional:    HEENT: temporal wasting    Neck:  No JVD, bruits or thyromegaly    Respiratory:  Clear without rales or rhonchi    Cardiovascular:  RR without murmur, rub or gallop.    Gastrointestinal: Soft without hepatosplenomegaly.    Extremities: without cyanosis, clubbing or edema.    Neurological:  Oriented   x3      . No gross sensory or motor defects.        LABS:                        11.9   14.6  )-----------( 196      ( 19 Oct 2017 09:27 )             34.0     10-19    130<L>  |  94<L>  |  20<H>  ----------------------------<  23<LL>  3.2<L>   |  30  |  <0.20<L>    Ca    7.3<L>      19 Oct 2017 09:27  Phos  1.9     10-19  Mg     1.9     10-19    TPro  5.5<L>  /  Alb  2.9<L>  /  TBili  0.8  /  DBili  x   /  AST  79<H>  /  ALT  67<H>  /  AlkPhos  83  10-19        PT/INR - ( 19 Oct 2017 15:09 )   PT: 15.0 sec;   INR: 1.37 ratio           Urinalysis Basic - ( 19 Oct 2017 01:05 )    Color: Yellow / Appearance: Clear / S.010 / pH: x  Gluc: x / Ketone: Negative  / Bili: Negative / Urobili: Negative   Blood: x / Protein: Negative / Nitrite: Negative   Leuk Esterase: Negative / RBC: 0-2 /HPF / WBC 0-2 /HPF   Sq Epi: x / Non Sq Epi: Occasional / Bacteria: Few /HPF      CAPILLARY BLOOD GLUCOSE  50 (19 Oct 2017 16:06)  81 (19 Oct 2017 15:09)  42 (19 Oct 2017 13:42)  104 (19 Oct 2017 11:19)      POCT Blood Glucose.: 81 mg/dL (19 Oct 2017 15:06)  POCT Blood Glucose.: 168 mg/dL (19 Oct 2017 13:50)  POCT Blood Glucose.: 42 mg/dL (19 Oct 2017 13:37)  POCT Blood Glucose.: 104 mg/dL (19 Oct 2017 11:17)  POCT Blood Glucose.: 116 mg/dL (19 Oct 2017 02:04)      RADIOLOGY & ADDITIONAL STUDIES:

## 2017-10-19 NOTE — CONSULT NOTE ADULT - PROBLEM SELECTOR RECOMMENDATION 9
likely due to malnutrition  r/o AI, unlikely insulinoma  obtain am cortisol level and acth  obtain insulin and c peptide levels  fsg q2hrs  ensure tid and bed time  consider ivfluids  free t4 level  check orthosatic bp

## 2017-10-19 NOTE — H&P ADULT - NSHPLABSRESULTS_GEN_ALL_CORE
Vital Signs Last 24 Hrs      T(C): 36.4 (19 Oct 2017 11:22), Max: 36.4 (19 Oct 2017 07:20)  T(F): 97.5 (19 Oct 2017 11:22), Max: 97.5 (19 Oct 2017 07:20)  HR: 94 (19 Oct 2017 11:22) (66 - 94)  BP: 118/82 (19 Oct 2017 11:22) (105/75 - 133/88)  BP(mean): --  RR: 18 (19 Oct 2017 11:22) (16 - 18)  SpO2: 100% (19 Oct 2017 11:22) (100% - 100%)

## 2017-10-19 NOTE — ED ADULT NURSE REASSESSMENT NOTE - GENERAL PATIENT STATE
patient care endorsed to RN Acrmen/cooperative/comfortable appearance
comfortable appearance
cooperative/resting/sleeping/comfortable appearance

## 2017-10-19 NOTE — CONSULT NOTE ADULT - PROBLEM SELECTOR RECOMMENDATION 9
Hypovolemic hyponatremia in the setting of diarrhea, n/v and decreased PO intake. Urine lytes reviewed. Urine lytes performed after IVF. TSH wnl  Check AM coritsol  Pt given 2L NS in ER with rapid increase in SNa from 121 to 130. Pt asymptomatic with no neuro deficits at this time.   Please hold all IVF. Check BMP q 4-6 hrs. If serum sodium increasing. Start D5 IV @ 50 cc/ hr. Goal SNa 127-129.  Serum sodium may increase with potassium supplementation. Check BMP q 4-6 hrs. Monitor serum Na.

## 2017-10-19 NOTE — CONSULT NOTE ADULT - SUBJECTIVE AND OBJECTIVE BOX
[facility] NEPHROLOGY- CONSULTATION NOTE    Patient is a 60yo M with no sign PMH p/w intermittent chronic diarrhea 10-18 times / day for 1year, +weight loss 70 lbs/ 1 yr & 3 months with recent N/ Vomiting x2 episodes. Pt c/o decreased PO intake due to decreased taste and no desire to eat. Pt states he suffers from Acid reflux. He was recently seen in [facility] ER in 2017 for similar symptoms s/p CT a/p with ?Enteritis. Pt was not admitted but advised to f/u with GI. Pt states he had an EGD/ Colonscopy in 2017 and as per pt he had gastritis with ?infection and acid reflux. Pt c/o epigastric pain with burning sensation to chest. Pt is unable to tolerate "salty and spicy" food. Pt denies any sick contacts or recent trave. Pt' s uncle had gastric CA. He quit drinking alcohol 7 yrs ago and smokes 5-7 cigg/day.   Pt found to be hypoglycemic and hyponatremic. s/p CT abd/pelvis wnl.   Pt found to be hyponatremic 121 in the ER. s/p 2L NS bolus and serum sodium increased to 130. Renal consulted for hyponatremia.       PAST MEDICAL & SURGICAL HISTORY:  Chronic diarrhea  Anorexia  Cachexia  Acid reflux  No significant past surgical history    No Known Allergies    Home Medications Reviewed  Hospital Medications:   MEDICATIONS  (STANDING):  potassium chloride    Tablet ER 40 milliEquivalent(s) Oral every 4 hours    SOCIAL HISTORY:  Denies ex. ETOh use. Current smoker.    FAMILY HISTORY: Uncle- Gastric CA.       REVIEW OF SYSTEMS:  Gen: + weight loss  HEENT: no rhinorrhea  Neck: no sore throat  Cards: no chest pain  Resp: no dyspnea  GI: +nausea + vomiting +diarrhea + epigastric pain  : no dysuria or hematuria  Vascular: no LE edema  Derm: no rashes  Neuro: no numbness/tingling  All other review of systems is negative unless indicated above.    VITALS:  T(F): 97.5 (10-19-17 @ 11:22), Max: 97.5 (10-19-17 @ 07:20)  HR: 94 (10-19-17 @ 11:22)  BP: 118/82 (10-19-17 @ 11:22)  RR: 18 (10-19-17 @ 11:22)  SpO2: 100% (10-19-17 @ 11:22)  Wt(kg): --    Height (cm): 187.96 (10-18 @ 23:52)  Weight (kg): 46.7 (10-18 @ 23:52)  BMI (kg/m2): 13.2 (10-18 @ 23:52)  BSA (m2): 1.64 (10-18 @ 23:52)    PHYSICAL EXAM:  Gen: Cachectic  HEENT: dry MM  Neck: no JVD  Cards: RRR, +S1/S2, no M/G/R  Resp: CTA B/L  GI: soft, NT/ND, NABS  : no CVA tenderness  Extremities: no LE edema B/L  Derm: no rashes  Neuro: non-focal    LABS:  10-19  130 <--, 121 <--  130<L>  |  94<L>  |  20<H>  ----------------------------<  23<LL>  3.2<L>   |  30  |  <0.20<L>    Ca    7.3<L>      19 Oct 2017 09:27  Phos  1.9     10-19  Mg     1.9     10-19    TPro  5.5<L>  /  Alb  2.9<L>  /  TBili  0.8  /  DBili      /  AST  79<H>  /  ALT  67<H>  /  AlkPhos  83  10-19    Creatinine Trend: <0.20 <--, 0.30 <--                        11.9   14.6  )-----------( 196      ( 19 Oct 2017 09:27 )             34.0     Urine Studies:  Urinalysis Basic - ( 19 Oct 2017 01:05 )    Color: Yellow / Appearance: Clear / S.010 / pH:   Gluc:  / Ketone: Negative  / Bili: Negative / Urobili: Negative   Blood:  / Protein: Negative / Nitrite: Negative   Leuk Esterase: Negative / RBC: 0-2 /HPF / WBC 0-2 /HPF   Sq Epi:  / Non Sq Epi: Occasional / Bacteria: Few /HPF      Osmolality, Random Urine: 207 mos/kg (10-19 @ 09:56)  Sodium, Random Urine: 65 mmol/L (10-19 @ 04:17)  Chloride, Random Urine: 56 mmol/L (10-19 @ 04:17)  Creatinine, Random Urine: <13 mg/dL (10-19 @ 04:17)    RADIOLOGY & ADDITIONAL STUDIES:      < from: CT Abdomen and Pelvis w/ Oral Cont and w/ IV Cont (10.19.17 @ 05:54) >    EXAM:  CT ABDOMEN AND PELVIS OC IC                            PROCEDURE DATE:  10/19/2017      < end of copied text >  < from: CT Abdomen and Pelvis w/ Oral Cont and w/ IV Cont (10.19.17 @ 05:54) >  KIDNEYS/URETERS: Symmetrically enhance without hydronephrosi    < end of copied text >  < from: CT Abdomen and Pelvis w/ Oral Cont and w/ IV Cont (10.19.17 @ 05:54) >  IMPRESSION:   No small bowel obstruction or active bowel inflammation.    Nonvisualization of the appendix.    < end of copied text >

## 2017-10-19 NOTE — H&P ADULT - ATTENDING COMMENTS
Patient seen and examined with PGY2 MAR; Agree with PGY 2 A/P above with editing as needed. d/w Dr. Fischer plan of care Patient seen and examined with PGY2 MAR; Agree with PGY 2 A/P above with editing as needed. d/w Dr. Fischer plan of care    61 year old male from home with no significant PMH presented with chronic abdominal pain, nausea, vomiting and diarrhea. Patient stated that he started having pain in his stomach for 7 months with no relationship to food. He had loss of appetite and loss of weight for 70 lb over this 7 months. The pain is also associated with nausea, vomiting and diarrhea. Patient stated that sometimes he has diarrhea about 8 times per days.  Patient had EGD and Colonoscopy in the past but unable to recall where. He also was in [facility] few days ago. He lives with his wife who we are unable to reach.     Patient is chronic ill looking, cachectic and malnourished. No vomting this morning. has intermittent sharp pain.     Vitals: Reviewed; As above    P/E: As above  Gen: cachectic, severely malnourished;   Neuro: AAO x 3; No gorss focal deficits  CVS: S1S2 present; regular  resp: BLAE+, No wheeze or Rhonchi  GI: Soft, scaphoid abdomen, tender in epigsatrium, non distended  Extr: No edema or Calf tenderness B/L LE    labs: reviewed    CT Abdomen and Pelvis w/ Oral Cont and w/ IV Cont (10.19.17 @ 05:54)     FINDINGS:     LOWER CHEST: Within normal limits.   LIVER: Within normal limits.   BILE DUCTS: Normal caliber.   GALLBLADDER: Within normal limits.   SPLEEN: Within normal limits.   PANCREAS: Within normal limits.   ADRENALS: Within normal limits.   KIDNEYS/URETERS: Symmetrically enhance without hydronephrosis.   BLADDER: Within normal limits.   REPRODUCTIVE ORGANS: The prostate is within normal limits.   BOWEL: No bowel obstruction. The appendix is not definitely visualized.   PERITONEUM: No ascites.   VESSELS:  Atherosclerotic calcifications. Extensive atheromatous plaque in the infrarenal abdominal aorta.  RETROPERITONEUM: No lymphadenopathy.   ABDOMINAL WALL: Within normal limits.   BONES: Within normal limits.     IMPRESSION:  No small bowel obstruction or active bowel inflammation.    Nonvisualization of the appendix.    D/D:  Hyponatremia likely Hypovolemic due to severe dehydration due to poor oral intake and GI losses (diarrhea, vomiting).  Weight loss with severe cachexia and GI symptoms concerning for Malignancy especially Gastric  Ca  Severe Protein Calorie Malnutrition.    Plan:  Medicine; Clear liquids orally; Protonix IV  Add Ensure MH thrice daily  CT chest without contrast to rule out esophageal/ gastric pathology  Na improved with IV Normal saline Patient seen and examined with PGY2 MAR; Agree with PGY 2 A/P above with editing as needed. d/w Dr. Fischer plan of care    61 year old male from home with no significant PMH presented with chronic abdominal pain, nausea, vomiting and diarrhea. Patient stated that he started having pain in his stomach for 7 months with no relationship to food. He had loss of appetite and loss of weight for 70 lb over this 7 months. The pain is also associated with nausea, vomiting and diarrhea. Patient stated that sometimes he has diarrhea about 8 times per days.  Patient had EGD and Colonoscopy in the past but unable to recall where. He also was in [facility] few days ago. He lives with his wife who we are unable to reach.     Patient is chronic ill looking, cachectic and malnourished. No vomting this morning. has intermittent sharp pain.     Vitals: Reviewed; As above    P/E: As above  Gen: cachectic, severely malnourished;   Neuro: AAO x 3; No gorss focal deficits  CVS: S1S2 present; regular  resp: BLAE+, No wheeze or Rhonchi  GI: Soft, scaphoid abdomen, tender in epigsatrium, non distended  Extr: No edema or Calf tenderness B/L LE    labs: reviewed    CT Abdomen and Pelvis w/ Oral Cont and w/ IV Cont (10.19.17 @ 05:54)     FINDINGS:     LOWER CHEST: Within normal limits.   LIVER: Within normal limits.   BILE DUCTS: Normal caliber.   GALLBLADDER: Within normal limits.   SPLEEN: Within normal limits.   PANCREAS: Within normal limits.   ADRENALS: Within normal limits.   KIDNEYS/URETERS: Symmetrically enhance without hydronephrosis.   BLADDER: Within normal limits.   REPRODUCTIVE ORGANS: The prostate is within normal limits.   BOWEL: No bowel obstruction. The appendix is not definitely visualized.   PERITONEUM: No ascites.   VESSELS:  Atherosclerotic calcifications. Extensive atheromatous plaque in the infrarenal abdominal aorta.  RETROPERITONEUM: No lymphadenopathy.   ABDOMINAL WALL: Within normal limits.   BONES: Within normal limits.     IMPRESSION:  No small bowel obstruction or active bowel inflammation.    Nonvisualization of the appendix.    D/D:  Hyponatremia likely Hypovolemic due to severe dehydration due to poor oral intake and GI losses (diarrhea, vomiting).  Weight loss with severe cachexia and GI symptoms concerning for Malignancy especially Gastric  Ca  Hypoglycemia and Hypothermia with Hyponatremia concerning for Adrenal insufficiency  Severe Protein Calorie Malnutrition.    Plan:  Medicine; Clear liquids orally; Protonix IV  Add Ensure MH thrice daily  CT chest without contrast to rule out esophageal/ gastric pathology  Na improved with IV Normal saline; Repeat BMP at 2PM; will resume IVF accordingly  GI consult d/w Dr. Gillette will schedule for EGD tomorrow. Will hold off Colonoscopy until clinically more stable  Nephrology eval d/w Dr. Robbins; Consult appreciated  Will get Endocrine lindsey Casey.    Discussed with PGY2 MAR Dr. Fischer; Discussed with RN  Discussed with patient plan of care and possible etiology including need to rule out occult malignancy. Patient seen and examined with PGY2 MAR; Agree with PGY 2 A/P above with editing as needed. d/w Dr. Fischer plan of care    61 year old male from home with no significant PMH presented with chronic abdominal pain, nausea, vomiting and diarrhea. Patient stated that he started having pain in his stomach for 7 months with no relationship to food. He had loss of appetite and loss of weight for 70 lb over this 7 months. The pain is also associated with nausea, vomiting and diarrhea. Patient stated that sometimes he has diarrhea about 8 times per days.  Patient had EGD and Colonoscopy in the past but unable to recall where. He also was in [facility] few days ago. He lives with his wife who we are unable to reach.     Patient is chronic ill looking, cachectic and malnourished. No vomiting this morning. has intermittent sharp pain.     Vitals: Reviewed; As above    P/E: As above  Gen: cachectic, severely malnourished;   Neuro: AAO x 3; No gorss focal deficits  CVS: S1S2 present; regular  resp: BLAE+, No wheeze or Rhonchi  GI: Soft, scaphoid abdomen, tender in epigsatrium, non distended  Extr: No edema or Calf tenderness B/L LE    labs: reviewed    CT Abdomen and Pelvis w/ Oral Cont and w/ IV Cont (10.19.17 @ 05:54)     FINDINGS:     LOWER CHEST: Within normal limits.   LIVER: Within normal limits.   BILE DUCTS: Normal caliber.   GALLBLADDER: Within normal limits.   SPLEEN: Within normal limits.   PANCREAS: Within normal limits.   ADRENALS: Within normal limits.   KIDNEYS/URETERS: Symmetrically enhance without hydronephrosis.   BLADDER: Within normal limits.   REPRODUCTIVE ORGANS: The prostate is within normal limits.   BOWEL: No bowel obstruction. The appendix is not definitely visualized.   PERITONEUM: No ascites.   VESSELS:  Atherosclerotic calcifications. Extensive atheromatous plaque in the infrarenal abdominal aorta.  RETROPERITONEUM: No lymphadenopathy.   ABDOMINAL WALL: Within normal limits.   BONES: Within normal limits.     IMPRESSION:  No small bowel obstruction or active bowel inflammation.    Nonvisualization of the appendix.    D/D:  Hyponatremia likely Hypovolemic due to severe dehydration due to poor oral intake and GI losses (diarrhea, vomiting).  Weight loss with severe cachexia and GI symptoms concerning for Malignancy especially Gastric  Ca  Hypoglycemia and Hypothermia with Hyponatremia concerning for Adrenal insufficiency  Severe Protein Calorie Malnutrition.  Electrolyte imbalance : Hypokalemia/ Hypophosphatemia due to poor oral intake    Plan:  Medicine; Clear liquids orally; Protonix IV  Add Ensure MH thrice daily  CT chest without contrast to rule out esophageal/ gastric pathology  Na improved with IV Normal saline; Repeat BMP at 2PM; will resume IVF accordingly  GI consult d/w Dr. Gillette will schedule for EGD tomorrow. Will hold off Colonoscopy until clinically more stable  Nephrology eval d/w Dr. Robbins; Consult appreciated  Will get Endocrine lindsey Casey.    Discussed with PGY2 MAR Dr. Fischer; Discussed with RN  Discussed with patient plan of care and possible etiology including need to rule out occult malignancy.

## 2017-10-20 NOTE — PROGRESS NOTE ADULT - PROBLEM SELECTOR PLAN 1
Likely from GI malignancy as associated with nausea, vomiting, weight loss and loss of appetite.   DDx -IBD as patient stated that EGD and colonoscopy with inflammation in GI tract.   CT abd with no acute pathology.   FOBT- positive  Unable to reach wife for medication history, HIPPA form faxed to [facility] at . Please follow up with patient medical record.   IVF as appropriate to patient electrolyte status with BMP.   Liquid diet, GI-Dr Holden. Plan for EDG/ ?colonoscopy. Likely from GI malignancy as associated with nausea, vomiting, weight loss and loss of appetite.   DDx -IBD as patient stated that EGD and colonoscopy with inflammation in GI tract.   CT abd with no acute pathology.   FOBT- positive  Unable to reach wife for medication history, HIPPA form faxed to [facility] at - no results faxed  - Called [facility] at [phone] and requested to fax medical records- 10/20  IVF as appropriate to patient electrolyte status with BMP.   Liquid diet, GI-Dr Holden. Plan for EDG/ ?colonoscopy.

## 2017-10-20 NOTE — DIETITIAN INITIAL EVALUATION ADULT. - FACTORS AFF FOOD INTAKE
pain/persistent constipation/vomiting/persistent nausea/weakness, abdominal pain/change in sense of smell or taste/difficulty with food procurement/preparation/persistent diarrhea/other (specify)

## 2017-10-20 NOTE — PROGRESS NOTE ADULT - PROBLEM SELECTOR PLAN 5
with 70 lb weightloss/ decreased PO intake. HIV neg.    r/o malignancy.   s/p EGD today f/u results. Management as per primary team.

## 2017-10-20 NOTE — PROGRESS NOTE ADULT - SUBJECTIVE AND OBJECTIVE BOX
Patient states feeling better this morning. His pain is improved. He had 1 BM this morning. No episodes of nausea and vomiting. Tolerating liquid diet.

## 2017-10-20 NOTE — CONSULT NOTE ADULT - SUBJECTIVE AND OBJECTIVE BOX
Patient is a 61y old  Male who presents with a chief complaint of abdominal pain, nausea, vomiting and diarrhea (19 Oct 2017 12:21)    HPI: 61y Male  presents with a chief complaint of         . The patient has a significant past medical history of           .     REVIEW OF SYSTEMS  Constitutional:   No fever, no fatigue, no pallor, no night sweats, no weight loss.  HEENT:   No eye pain, no vision changes, no icterus, no mouth ulcers.  Respiratory:   No shortness of breath, no cough, no respiratory distress.   Cardiovascular:   No chest pain, no palpitations.   Gastrointestinal: No abdominal pain, no nausea, no vomiting , no diahrrea, no constipation, no hematochezia,no melena.  Skin:   No rashes, no jaundice, no eczema.   Musculoskeletal:   No joint pain, no swelling, no myalgia.   Neurologic:   No headache, no seizure, no weakness.   Genitourinary:   No dysuria, no decreased urine output.  Psychiatric:  No depression, no anxiety,   Endocrine:   No thyroid disease, no diabetes.  Heme/Lymphatic:   No anemia, no blood transfusions, no lymph node enlargement, no bleeding, no bruising.  ___________________________________________________________________________________________  Allergies    No Known Allergies    Intolerances      MEDICATIONS  (STANDING):  dextrose 5% + sodium chloride 0.9% with potassium chloride 20 mEq/L 1000 milliLiter(s) (75 mL/Hr) IV Continuous <Continuous>  enoxaparin Injectable 40 milliGRAM(s) SubCutaneous daily  pantoprazole  Injectable 40 milliGRAM(s) IV Push daily  potassium acid phosphate/sodium acid phosphate tablet (K-PHOS No. 2) 1 Tablet(s) Oral four times a day with meals    MEDICATIONS  (PRN):  ondansetron Injectable 4 milliGRAM(s) IV Push every 8 hours PRN Nausea and/or Vomiting      PAST MEDICAL & SURGICAL HISTORY:  Chronic diarrhea  Anorexia  Cachexia  Acid reflux  No significant past surgical history    FAMILY HISTORY:  No pertinent family history in first degree relatives    Social History: No hsitory of : Tobacco use, IVDA, EToH  ______________________________________________________________________________________    PHYSICAL EXAM    Daily     Daily   BMI: 13.2 (10-18 @ 23:52)  Change in Weight:  Vital Signs Last 24 Hrs  T(C): 36.3 (20 Oct 2017 08:12), Max: 36.4 (19 Oct 2017 11:22)  T(F): 97.3 (20 Oct 2017 08:12), Max: 97.6 (20 Oct 2017 00:25)  HR: 73 (20 Oct 2017 08:12) (72 - 110)  BP: 100/70 (20 Oct 2017 08:12) (97/61 - 121/88)  BP(mean): --  RR: 16 (20 Oct 2017 08:12) (14 - 18)  SpO2: 100% (20 Oct 2017 08:12) (97% - 100%)    General:  Well developed, well nourished, alert and active, no pallor, NAD.  HEENT:    Normal appearance of conjunctiva, ears, nose, lips, oropharynx, and oral mucosa, anicteric.  Neck:  No masses, no asymmetry.  Lymph Nodes:  No lymphadenopathy.   Cardiovascular:  RRR normal S1/S2, no murmur.  Respiratory:  CTA B/L, normal respiratory effort.   Abdominal:   soft, no masses or tenderness, normoactive BS, NT/ND, no HSM.  Extremities:   No clubbing or cyanosis, normal capillary refill, no edema.   Skin:   No rash, jaundice, lesions, eczema.   Musculoskeletal:  No joint swelling, erythema or tenderness.   Neuro: No focal deficits.   Other:   _______________________________________________________________________________________________  Lab Results:                          12.9   13.4  )-----------( 209      ( 20 Oct 2017 06:05 )             38.5     10-20    132<L>  |  97  |  15  ----------------------------<  93  4.2   |  32<H>  |  0.30<L>    Ca    7.8<L>      20 Oct 2017 06:05  Phos  0.8     10-20  Mg     1.9     10-20    TPro  5.6<L>  /  Alb  2.9<L>  /  TBili  0.7  /  DBili  x   /  AST  75<H>  /  ALT  72<H>  /  AlkPhos  89  10-20    LIVER FUNCTIONS - ( 20 Oct 2017 06:05 )  Alb: 2.9 g/dL / Pro: 5.6 g/dL / ALK PHOS: 89 U/L / ALT: 72 U/L DA / AST: 75 U/L / GGT: x           PT/INR - ( 20 Oct 2017 06:05 )   PT: 15.6 sec;   INR: 1.42 ratio         PTT - ( 20 Oct 2017 06:05 )  PTT:38.8 sec        Stool Results:          RADIOLOGY RESULTS:    SURGICAL PATHOLOGY: Patient is a 61y old  Male who presents with a chief complaint of abdominal pain, nausea, vomiting and diarrhea (19 Oct 2017 12:21)    61 year old male from home with no significant PMH presented with chronic abdominal pain, nausea, vomiting and diarrhea. Patient stated that he started having pain in his stomach for around  7 months.  He  also complains of  loss of appetite and over a  70 lb  weight loss over 6-8 months.  The patient also complains  of  episodes of nausea  and  vomiting  with occasional diarrhea.  He recently underwent a EGD and colonoscopy in February of this year and is not sure about his results.   REVIEW OF SYSTEMS  Constitutional:   See HPI   HEENT:   No eye pain, no vision changes, no icterus, no mouth ulcers.  Respiratory:   No shortness of breath, no cough, no respiratory distress.   Cardiovascular:   No chest pain, no palpitations.   Gastrointestinal: See HPI   Skin:   No rashes, no jaundice, no eczema.   Musculoskeletal:   No joint pain, no swelling, no myalgia.   Neurologic:   No headache, no seizure, no weakness.   Genitourinary:   No dysuria, no decreased urine output.  Psychiatric:  No depression, no anxiety,   Endocrine:   No thyroid disease, no diabetes.  Heme/Lymphatic:   No anemia, no blood transfusions, no lymph node enlargement, no bleeding, no bruising.  ___________________________________________________________________________________________  Allergies    No Known Allergies    Intolerances      MEDICATIONS  (STANDING):  dextrose 5% + sodium chloride 0.9% with potassium chloride 20 mEq/L 1000 milliLiter(s) (75 mL/Hr) IV Continuous <Continuous>  enoxaparin Injectable 40 milliGRAM(s) SubCutaneous daily  pantoprazole  Injectable 40 milliGRAM(s) IV Push daily  potassium acid phosphate/sodium acid phosphate tablet (K-PHOS No. 2) 1 Tablet(s) Oral four times a day with meals    MEDICATIONS  (PRN):  ondansetron Injectable 4 milliGRAM(s) IV Push every 8 hours PRN Nausea and/or Vomiting      PAST MEDICAL & SURGICAL HISTORY:  Chronic diarrhea  Anorexia  Cachexia  Acid reflux  No significant past surgical history    FAMILY HISTORY:  No pertinent family history in first degree relatives    Social History: No history of : Tobacco use, IVDA, EToH  ______________________________________________________________________________________    PHYSICAL EXAM    Daily     Daily   BMI: 13.2 (10-18 @ 23:52)  Change in Weight:  Vital Signs Last 24 Hrs  T(C): 36.3 (20 Oct 2017 08:12), Max: 36.4 (19 Oct 2017 11:22)  T(F): 97.3 (20 Oct 2017 08:12), Max: 97.6 (20 Oct 2017 00:25)  HR: 73 (20 Oct 2017 08:12) (72 - 110)  BP: 100/70 (20 Oct 2017 08:12) (97/61 - 121/88)  BP(mean): --  RR: 16 (20 Oct 2017 08:12) (14 - 18)  SpO2: 100% (20 Oct 2017 08:12) (97% - 100%)    General:  Severely cachectic   HEENT:    Normal appearance of conjunctiva, ears, nose, lips, oropharynx, and oral mucosa, anicteric.  Neck:  No masses, no asymmetry.  Lymph Nodes:  No lymphadenopathy.   Cardiovascular:  RRR normal S1/S2, no murmur.  Respiratory:  CTA B/L, normal respiratory effort.   Abdominal:   soft, no masses or tenderness, normoactive BS, NT/ND, no HSM.  Extremities:   No clubbing or cyanosis, normal capillary refill, no edema.   Skin:   No rash, jaundice, lesions, eczema.   Musculoskeletal:  No joint swelling, erythema or tenderness.   Neuro: No focal deficits.   Other:   _______________________________________________________________________________________________  Lab Results:                          12.9   13.4  )-----------( 209      ( 20 Oct 2017 06:05 )             38.5     10-20    132<L>  |  97  |  15  ----------------------------<  93  4.2   |  32<H>  |  0.30<L>    Ca    7.8<L>      20 Oct 2017 06:05  Phos  0.8     10-20  Mg     1.9     10-20    TPro  5.6<L>  /  Alb  2.9<L>  /  TBili  0.7  /  DBili  x   /  AST  75<H>  /  ALT  72<H>  /  AlkPhos  89  10-20    LIVER FUNCTIONS - ( 20 Oct 2017 06:05 )  Alb: 2.9 g/dL / Pro: 5.6 g/dL / ALK PHOS: 89 U/L / ALT: 72 U/L DA / AST: 75 U/L / GGT: x           PT/INR - ( 20 Oct 2017 06:05 )   PT: 15.6 sec;   INR: 1.42 ratio         PTT - ( 20 Oct 2017 06:05 )  PTT:38.8 sec        Stool Results:          RADIOLOGY RESULTS:    EXAM:  CT ABDOMEN AND PELVIS OC IC                            PROCEDURE DATE:  10/19/2017          INTERPRETATION:  CLINICAL INFORMATION: Abdominal pain     COMPARISON: September 8, 2017.     PROCEDURE:   CT of the Abdomen and Pelvis was performed with intravenous contrast.   Intravenous contrast: 90 ml Omnipaque 350. 10 ml discarded.   Oral contrast: positive contrast was administered.   Sagittal and coronal reformats were performed.     FINDINGS:     LOWER CHEST: Within normal limits.     LIVER: Within normal limits.   BILE DUCTS: Normal caliber.   GALLBLADDER: Within normal limits.   SPLEEN: Within normal limits.   PANCREAS: Within normal limits.   ADRENALS: Within normal limits.   KIDNEYS/URETERS: Symmetrically enhance without hydronephrosis.     BLADDER: Within normal limits.   REPRODUCTIVE ORGANS: The prostate is within normal limits.     BOWEL: No bowel obstruction. The appendix is not definitely visualized.   PERITONEUM: No ascites.   VESSELS:  Atherosclerotic calcifications. Extensive atheromatous plaque   in the infrarenal abdominal aorta.  RETROPERITONEUM: No lymphadenopathy.   ABDOMINAL WALL: Within normal limits.   BONES: Within normal limits.     IMPRESSION:   No small bowel obstruction or active bowel inflammation.    Nonvisualization of the appendix.                PHILIP GATES M.D., ATTENDING RADIOLOGIST  This document has been electronically signed. Oct 19 2017  6:13AM                SURGICAL PATHOLOGY:

## 2017-10-20 NOTE — PROGRESS NOTE ADULT - PROBLEM SELECTOR PLAN 3
Hyponatremia, Hypokalemia and Hypoglycemia  Concern for adrenal insufficiency.   f/u random cortisol level.   Hyponatremia 121 on admission corrected to 130 in 9 hour.   On D5 NS@75ml/hr  BMP q6hr  Dr Robbins-Nephro

## 2017-10-20 NOTE — CHART NOTE - NSCHARTNOTEFT_GEN_A_CORE
Upon Nutritional Assessment by the Registered Dietitian your patient was determined to meet criteria / has evidence of the following diagnosis/diagnoses:          [ ]  Mild Protein Calorie Malnutrition        [ ]  Moderate Protein Calorie Malnutrition        [X ] Severe Protein Calorie Malnutrition        [ ] Unspecified Protein Calorie Malnutrition        [ X] Underweight / BMI <19        [ ] Morbid Obesity / BMI > 40      Findings as based on:  •  Comprehensive nutrition assessment and consultation  •  Calorie counts (nutrient intake analysis)  •  Food acceptance and intake status from observations by staff  •  Follow up  •  Patient education  •  Intervention secondary to interdisciplinary rounds  •   concerns      Treatment:    The following diet has been recommended:      PROVIDER Section:     By signing this assessment you are acknowledging and agree with the diagnosis/diagnoses assigned by the Registered Dietitian    Comments:  Add Ensure Clear 1 can TID to Clear Liquid diet & MVI/minerals, as medically feasible.

## 2017-10-20 NOTE — PROGRESS NOTE ADULT - SUBJECTIVE AND OBJECTIVE BOX
[facility] NEPHROLOGY- PROGRESS NOTE    62yo M with no significant PMH p/w intermittent chronic diarrhea 10-18 times / day for 1year, +weight loss 70 lbs (1 yr & 3 months), with nausea/ vomiting and decreased PO intake. Pt a/w FTT, hypoglycemia and hyponatremia. s/p CT abd/pelvis wnl.   Pt found to be hyponatremic 121 in the ER. s/p 2L NS bolus and serum sodium increased to 130. Renal consulted for hyponatremia.     Hospital Medications: Medications reviewed.  REVIEW OF SYSTEMS: Pt s/p EGD. Lethargic. Limited ROS- denies any SOB, n/v Pt c/o diarrhea.     VITALS:  T(F): 97.3 (10-20-17 @ 08:12), Max: 97.6 (10-20-17 @ 00:25)  HR: 73 (10-20-17 @ 08:12)  BP: 100/70 (10-20-17 @ 08:12)  RR: 16 (10-20-17 @ 08:12)  SpO2: 100% (10-20-17 @ 08:12)  Wt(kg): --  Height (cm): 187.96 (10-18 @ 23:52)  Weight (kg): 46.7 (10-18 @ 23:52)  BMI (kg/m2): 13.2 (10-18 @ 23:52)  BSA (m2): 1.64 (10-18 @ 23:52)    10 @ 07:01  -  10-20 @ 07:00  --------------------------------------------------------  IN: 0 mL / OUT: 400 mL / NET: -400 mL      PHYSICAL EXAM:  Gen: Cachectic  HEENT: dry MM  Cards: RRR, +S1/S2, no M/G/R  Resp: CTA B/L  GI: soft, NT/ND, NABS  : no mckeon  Extremities: no LE edema B/L        LABS:  10-20  132 <--, 131 <--, 131 <--, 130 <--, 121 <--  132<L>  |  97  |  15  ----------------------------<  93  4.2   |  32<H>  |  0.30<L>    Ca    7.8<L>      20 Oct 2017 06:05  Phos  0.8     10-20  Mg     1.9     10-20    TPro  5.6<L>  /  Alb  2.9<L>  /  TBili  0.7  /  DBili      /  AST  75<H>  /  ALT  72<H>  /  AlkPhos  89  1020    Creatinine Trend: 0.30 <--, 0.40 <--, 0.30 <--, <0.20 <--, 0.30 <--                        12.9   13.4  )-----------( 209      ( 20 Oct 2017 06:05 )             38.5     Urine Studies:  Urinalysis Basic - ( 19 Oct 2017 01:05 )    Color: Yellow / Appearance: Clear / S.010 / pH:   Gluc:  / Ketone: Negative  / Bili: Negative / Urobili: Negative   Blood:  / Protein: Negative / Nitrite: Negative   Leuk Esterase: Negative / RBC: 0-2 /HPF / WBC 0-2 /HPF   Sq Epi:  / Non Sq Epi: Occasional / Bacteria: Few /HPF      Osmolality, Random Urine: 321 mos/kg (10-19 @ 22:23)  Chloride, Random Urine: 91 mmol/L (10-19 @ 22:23)  Creatinine, Random Urine: <13 mg/dL (10-19 @ 22:23)  Sodium, Random Urine: 70 mmol/L (10-19 @ 22:23)  Osmolality, Random Urine: 207 mos/kg (10-19 @ 09:56)  Sodium, Random Urine: 65 mmol/L (10-19 @ 04:17)  Chloride, Random Urine: 56 mmol/L (10-19 @ 04:17)  Creatinine, Random Urine: <13 mg/dL (10-19 @ 04:17)    RADIOLOGY & ADDITIONAL STUDIES:

## 2017-10-20 NOTE — PROGRESS NOTE ADULT - SUBJECTIVE AND OBJECTIVE BOX
Interval Events:  pt just returned from endoscopy  remains npo    Allergies    No Known Allergies    Intolerances      Endocrine/Metabolic Medications:      Vital Signs Last 24 Hrs  T(C): 36.3 (20 Oct 2017 08:12), Max: 36.4 (20 Oct 2017 00:25)  T(F): 97.3 (20 Oct 2017 08:12), Max: 97.6 (20 Oct 2017 00:25)  HR: 73 (20 Oct 2017 08:12) (72 - 110)  BP: 100/70 (20 Oct 2017 08:12) (97/61 - 121/88)  BP(mean): --  RR: 16 (20 Oct 2017 08:12) (14 - 18)  SpO2: 100% (20 Oct 2017 08:12) (97% - 100%)      PHYSICAL EXAM  All physical exam findings normal, except those marked:  General:	Alert, active, cooperative, NAD, well hydrated  .		[] Abnormal:  Neck		Normal: supple, no cervical adenopathy, no palpable thyroid  .		[] Abnormal:  Cardiovascular	Normal: regular rate, normal S1, S2, no murmurs  .		[] Abnormal:  Respiratory	Normal: no chest wall deformity, normal respiratory pattern, CTA B/L  .		[] Abnormal:  Abdominal	Normal: soft, ND, NT, bowel sounds present, no masses, no organomegaly  .		[] Abnormal:  		Normal normal genitalia, testes descended, circumcised/uncircumcised  .		Jennifer stage:			Breast jennifer:  .		Menstrual history:  .		[] Abnormal:  Extremities	Normal: FROM x4  .		[] Abnormal:  Skin		Normal: intact and not indurated, no rash, no acanthosis nigricans  .		[] Abnormal:  Neurologic	Normal: grossly intact  .		[] Abnormal:    LABS                        12.9   13.4  )-----------( 209      ( 20 Oct 2017 06:05 )             38.5                               132    |  97     |  15                  Calcium: 7.8   / iCa: x      (10-20 @ 06:05)    ----------------------------<  93        Magnesium: 1.9                              4.2     |  32     |  0.30             Phosphorous: 0.8      TPro  5.6    /  Alb  2.9    /  TBili  0.7    /  DBili  x      /  AST  75     /  ALT  72     /  AlkPhos  89     20 Oct 2017 06:05    CAPILLARY BLOOD GLUCOSE  106 (20 Oct 2017 11:47)  86 (20 Oct 2017 08:43)  148 (19 Oct 2017 22:00)  126 (19 Oct 2017 16:58)  50 (19 Oct 2017 16:06)  81 (19 Oct 2017 15:09)      POCT Blood Glucose.: 79 mg/dL (20 Oct 2017 14:00)  POCT Blood Glucose.: 106 mg/dL (20 Oct 2017 11:47)  POCT Blood Glucose.: 86 mg/dL (20 Oct 2017 08:43)  POCT Blood Glucose.: 148 mg/dL (19 Oct 2017 21:57)  POCT Blood Glucose.: 126 mg/dL (19 Oct 2017 16:56)  POCT Blood Glucose.: 81 mg/dL (19 Oct 2017 15:06)        Assesment/plan    Hypoglycemia- due to chr malnutrition  fsg better with iv d5ns  cont ivf  start po feeds   ensure tid  no evidence of AI

## 2017-10-20 NOTE — DIETITIAN INITIAL EVALUATION ADULT. - OTHER INFO
Nutrition consult requested for BMI <18 & Failure to Thrive. Patient from home lives with family. Visited pt. alert but lethargic, reports appetite very poor past 7 months due to persistent diarrhea with abdominal pain & "on & off" nausea/vomiting, aslo in & out of hospitals, eating very little foods at home with change in taste buds, stated  Lbs & loss 70 Lbs >7months ago, in house pt. presently Clear Liquid diet with IV Fluids & awaiting EGD/colonoscopy possible today, followed by GI/team, D/W RN/MD, skin intact.

## 2017-10-20 NOTE — PROGRESS NOTE ADULT - ASSESSMENT
62yo M with no significant PMH p/w intermittent chronic diarrhea 10-18 times / day for 1year, +weight loss 70 lbs (1 yr & 3 months), with nausea/ vomiting and decreased PO intake. Pt a/w FTT, hypoglycemia and hyponatremia. s/p CT abd/pelvis wnl.   Pt found to be hyponatremic 121 in the ER. s/p 2L NS bolus and serum sodium increased to 130. Renal consulted for hyponatremia.

## 2017-10-20 NOTE — CONSULT NOTE ADULT - PROBLEM SELECTOR RECOMMENDATION 2
in the setting of decreased PO intake. Pt give KCl IV/ PO. Monitor serum potassium.
likely due to malnutrition  replace electrolytes  r/o ai  gi w/u
Agree with work up for insulinoma  Consider MRI of the pancreas (better imaging modality than CT scan)  Follow up endo reccs.

## 2017-10-20 NOTE — PROGRESS NOTE ADULT - PROBLEM SELECTOR PLAN 2
Likely from chronic diarrhea, concern for malignancy.   EGD colonoscopy  with Dr Holden as above.  Concern for TB as patient from [facility], f/u QuantiFeron   CT chest done Likely from chronic diarrhea, concern for malignancy.   EGD colonoscopy with Dr Holden as above.  Concern for TB as patient from [facility], f/u QuantiFeron   CT chest done

## 2017-10-20 NOTE — DIETITIAN INITIAL EVALUATION ADULT. - PHYSICAL APPEARANCE
debilitated/BMI 13.2, Cachectic, Bitemporal wasting-severe, muscle fat wasting-severe, subcutaneous fat wasting-severe, Scapula region-wasting/emaciated/other (specify)

## 2017-10-20 NOTE — PROGRESS NOTE ADULT - ATTENDING COMMENTS
[facility] NEPHROLOGY  Bob Peñaloza M.D.  Bandar Angel D.O.  Matilde Robbins M.D.  Darlyn Luo, MSN, ANP-C  [phone]    [address] [place] [address]

## 2017-10-20 NOTE — PROGRESS NOTE ADULT - PROBLEM SELECTOR PLAN 1
Hypovolemic hyponatremia in the setting of diarrhea, n/v and decreased PO intake. Urine lytes reviewed. Urine lytes performed after IVF. TSH wnl  Pt given 2L NS in ER with rapid increase in SNa from 121 to 130. Pt asymptomatic with no neuro deficits at this time.   Pt now with increase in SNa 11 meq/ 24 hrs- rapid rise in serum Na. Please repeat BMP. Change IVF from D5 NS with 20meq KCL to D5 20meq KCl @ 50ml/hr. Please avoid rapid rise in SNa.  Goal SNa 127-129/ 24 hrs or 132-137 after 48hrs from admission.  Serum sodium may increase with potassium supplementation. Check BMP q 4-6 hrs. Monitor serum Na.

## 2017-10-20 NOTE — PROGRESS NOTE ADULT - ATTENDING COMMENTS
Patient seen at bedside s/p EGD, he refused to be examined at this time and asked to respect his wish and leave him to rest.  EGD pertinent for Candidal esophagitis, abnormal duodenal mucosa s/p multiple biopsies.   Physical exam: refused  vitals: stable except for hypoglycemia in spite being on Dextrose in IV fluids, down to 40ies.  Labs: hyponatremia: 132  A/P  1- Cachexia: suspect malignancy induced vs malabsorption.  GI input appreciated, f.u pathology reports. ? colonoscopy next week  continue with Diet, snacks and ensure TID  Trying to obtain records of his previous admission, but no response yet.  2- Hyponatremia: fast correction on first day of admission from 121 to 130, currently stable at 132 since am.  Will increase rate of IV fluids to 100 ml with BMP monitoring q 6 hours.  Renal input appreciated  most likely secondary to GI losses.   3- Hypoglycemia: unclear etiology, suspecting insulinoma.  Please obtain C peptide and Insulin level during the episode of hypoglycemia to denote any evidence of Insulinoma  MRI of the abdomen with gadolinium  Endo input appreciated  D50 pushes and glucagon at standby  multiple snacks Frequently.  consider octreotide scan if continues to have episodes of hypoglycemia  increase rate of IV fluids to 100  resume diet  rest as above.

## 2017-10-20 NOTE — CONSULT NOTE ADULT - PROBLEM SELECTOR RECOMMENDATION 3
Will perform EGD today   NPO for today   Monitor for refeeding syndrome (obtain daily phos and magnesium and replace accordingly) Will perform EGD today   NPO for today   Monitor for refeeding syndrome (obtain daily phos and magnesium and replace accordingly)  Check stool O and P   Check Stool elastase to rule out pancreatic insufficiency

## 2017-10-21 NOTE — PROGRESS NOTE ADULT - SUBJECTIVE AND OBJECTIVE BOX
62yo M with no significant PMH p/w intermittent chronic diarrhea 10-18 times / day for 1year, +weight loss 70 lbs (1 yr & 3 months), with nausea/ vomiting and decreased PO intake. Pt a/w FTT, hypoglycemia and hyponatremia. s/p CT abd/pelvis wnl.   Pt found to be hyponatremic 121 in the ER. s/p 2L NS bolus and serum sodium increased to 130. Renal consulted for hyponatremia.     Patient feeling a little better today.  Had bowel movement that was more formed.       REVIEW OF SYSTEMS: No h/a, cp, sob, dysuria.    Vital Signs Last 24 Hrs  T(C): 37 (21 Oct 2017 16:06), Max: 37 (21 Oct 2017 16:06)  T(F): 98.6 (21 Oct 2017 16:06), Max: 98.6 (21 Oct 2017 16:06)  HR: 89 (21 Oct 2017 16:06) (89 - 94)  BP: 118/82 (21 Oct 2017 16:06) (111/70 - 118/82)  BP(mean): --  RR: 18 (21 Oct 2017 16:06) (18 - 18)  SpO2: 100% (21 Oct 2017 16:06) (100% - 100%)  --------------------------------------------------------  IN: 1000 mL / OUT: 1300 mL / NET: -300 mL      PHYSICAL EXAM:  Gen: Severely cachectic, alert, cooperative  HEENT: dry MM  Cards: RRR, +S1/S2, no M/G/R  Resp: CTA B/L  GI: soft, non-tender, bs present  Extremities: no LE edema B/L  Neurol: alert, oriented X 3, non-focal examination      LABS:  10-21    136  |  96  |  10  ----------------------------<  84  3.5   |  33<H>  |  0.23<L>    Ca    7.7<L>      21 Oct 2017 00:33  Phos  0.8     10-20  Mg     1.9     10-20    TPro  5.6<L>  /  Alb  2.9<L>  /  TBili  0.7  /  DBili      /  AST  75<H>  /  ALT  72<H>  /  AlkPhos  89  10-20    Creatinine Trend: 0.23 <--, 0.37 <--, 0.29 <--, 0.30 <--, 0.40 <--, 0.30 <--, <0.20 <--, 0.30 <--                        12.9   13.4  )-----------( 209      ( 20 Oct 2017 06:05 )             38.5     Urine Studies:  Urinalysis Basic - ( 19 Oct 2017 01:05 )    Color: Yellow / Appearance: Clear / S.010 / pH:   Gluc:  / Ketone: Negative  / Bili: Negative / Urobili: Negative   Blood:  / Protein: Negative / Nitrite: Negative   Leuk Esterase: Negative / RBC: 0-2 /HPF / WBC 0-2 /HPF   Sq Epi:  / Non Sq Epi: Occasional / Bacteria: Few /HPF    Basic Metabolic Panel (10.21.17 @ 19:41)    Sodium, Serum: 135 mmol/L    Potassium, Serum: 3.4 mmol/L    Chloride, Serum: 97 mmol/L    Carbon Dioxide, Serum: 32 mmol/L    Anion Gap, Serum: 6 mmol/L    Blood Urea Nitrogen, Serum: 10 mg/dL    Creatinine, Serum: 0.39 mg/dL    Glucose, Serum: 110 mg/dL    Calcium, Total Serum: 7.8 mg/dL    eGFR if Non : 131: Interpretative comment  The units for eGFR are ml/min/1.73m2 (normalized body surface area). The  eGFR is calculated from a serum creatinine using the CKD-EPI equation.  Other variables required for calculation are race, age and sex. Among  patients with chronic kidney disease (CKD), the eGFR is useful in  determining the stage of disease according to KDOQI CKD classification.  All eGFR results are reported numerically with the following  interpretation.          GFR                    With                 Without     (ml/min/1.73 m2)    Kidney Damage       Kidney Damage        >= 90                    Stage 1                     Normal        60-89                    Stage 2                     Decreased GFR        30-59     Stage 3                     Stage 3        15-29                    Stage 4                     Stage 4        < 15                      Stage 5                     Stage 5  Each stage of CKD assumes that the associated GFR level has been in  effect for at least 3 months. Determination of stages one and two (with  eGFR > 59 ml/min/m2) requires estimation of kidney damage for at least 3  months as defined by structural or functional abnormalities.  Limitations: All estimates of GFR will be less accurate for patients at  extremes of muscle mass (including but not limited to frail elderly,  critically ill, or cancer patients), those with unusual diets, and those  with conditions associated with reduced secretion or extrarenal  elimination of creatinine. The eGFR equation is not recommended for use  in patients with unstable creatinine levels. mL/min/1.73M2    eGFR if African American: 152 mL/min/1.73M2    Cortisol AM, Serum (10.20.17 @ 13:16)    Cortisol AM, Serum: 24.4: Note reference range change for CORTAM and CORTPM. ug/dL      Insulin Level, Serum: 1.8 uU/mL (10.21.17 @ 02:26) (low)    C-Peptide, Serum: 0.6 ng/mL (10.21.17 @ 02:22)  (low)    Adrenocorticotropic Hormone, Serum (10.20.17 @ 11:54)    Adrenocorticotropic Hormone, Serum: 13 pg/mL (0-46)    Ferritin, Serum: 2385.0: Test Repeated ng/mL (10.20.17 @ 09:53)  (very high)

## 2017-10-21 NOTE — PROGRESS NOTE ADULT - ATTENDING COMMENTS
[facility] NEPHROLOGY  Bob Peñaloza M.D.  Bandar Angel D.O.  Matilde Robbins M.D.  Darlyn Luo, MSN, ANP-C  [phone]    [address] [place] [address]

## 2017-10-21 NOTE — PROGRESS NOTE ADULT - PROBLEM SELECTOR PLAN 1
Close monitoring of serum sodium.  If sodium increases above 136, would change IVF to 1/2NS (or D5 1/2NS + KCl).  Avoid rapid correction of serum sodium.

## 2017-10-21 NOTE — PROGRESS NOTE ADULT - PROBLEM SELECTOR PLAN 4
Severe PCM in the setting of decreased PO intake. Agree with ensure tid.  Work-up for cause of PCM by primary team.

## 2017-10-21 NOTE — PROGRESS NOTE ADULT - PROBLEM SELECTOR PLAN 5
with 70 lb weightloss/ decreased PO intake. HIV neg.    r/o malignancy.   s/p EGD f/u results. Management as per primary team.

## 2017-10-21 NOTE — PROGRESS NOTE ADULT - PROBLEM SELECTOR PLAN 1
Close monitoring of serum sodium.  If sodium increases above 136, will change IVF to 1/2NS (or D5 1/2NS + KCl).  Avoid rapid correction of serum sodium.

## 2017-10-21 NOTE — PROGRESS NOTE ADULT - ASSESSMENT
60yo M with no significant PMH p/w intermittent chronic diarrhea 10-18 times / day for 1year, +weight loss 70 lbs (1 yr & 3 months), with nausea/ vomiting and decreased PO intake. Pt a/w FTT, hypoglycemia and hyponatremia. s/p CT abd/pelvis wnl.     Hyponatremia due to poor po solute intake (tea/toast podomania) as well as dehydration.  Now resolved with IV NS.  Rate of sodium rise was a little higher than goal over the first 24hrs, but now has leveled off.  Hypokalemia due to poor intake and possible GI losses. Severe PCM, with underlying cause unclear.

## 2017-10-21 NOTE — PROGRESS NOTE ADULT - PROBLEM SELECTOR PLAN 5
with 70 lb weightloss/ decreased PO intake. HIV neg.    r/o malignancy.   s/p EGD f/u results, abnormal duodenal mucosa---may explain diarrhea, malabsorption.

## 2017-10-21 NOTE — PROGRESS NOTE ADULT - SUBJECTIVE AND OBJECTIVE BOX
[facility] NEPHROLOGY- PROGRESS NOTE    62yo M with no significant PMH p/w intermittent chronic diarrhea 10-18 times / day for 1year, +weight loss 70 lbs (1 yr & 3 months), with nausea/ vomiting and decreased PO intake. Pt a/w FTT, hypoglycemia and hyponatremia. s/p CT abd/pelvis wnl.   Pt found to be hyponatremic 121 in the ER. s/p 2L NS bolus and serum sodium increased to 130. Renal consulted for hyponatremia.     Pt c/o abd pain/discomfort/cramping.  He is unaware of just how cachectic he is.    Hospital Medications: Medications reviewed.  REVIEW OF SYSTEMS: No h/a, cp, sob, dysuria.    VITALS:  T(F): 97.5 (10-21-17 @ 00:19), Max: 98.3 (10-20-17 @ 16:02)  HR: 94 (10-21-17 @ 00:19)  BP: 111/70 (10-21-17 @ 00:19)  RR: 18 (10-21-17 @ 00:19)  SpO2: 100% (10-21-17 @ 00:19)  Wt(kg): --    10-20 @ 07:01  -  10-21 @ 07:00  --------------------------------------------------------  IN: 1000 mL / OUT: 1300 mL / NET: -300 mL      PHYSICAL EXAM:  Gen: Severely cachectic  HEENT: dry MM  Cards: RRR, +S1/S2, no M/G/R  Resp: CTA B/L  GI: soft, NT/ND, NABS  : no mckeon  Extremities: no LE edema B/L      LABS:  10-21    136  |  96  |  10  ----------------------------<  84  3.5   |  33<H>  |  0.23<L>    Ca    7.7<L>      21 Oct 2017 00:33  Phos  0.8     10-20  Mg     1.9     10-20    TPro  5.6<L>  /  Alb  2.9<L>  /  TBili  0.7  /  DBili      /  AST  75<H>  /  ALT  72<H>  /  AlkPhos  89  10    Creatinine Trend: 0.23 <--, 0.37 <--, 0.29 <--, 0.30 <--, 0.40 <--, 0.30 <--, <0.20 <--, 0.30 <--                        12.9   13.4  )-----------( 209      ( 20 Oct 2017 06:05 )             38.5     Urine Studies:  Urinalysis Basic - ( 19 Oct 2017 01:05 )    Color: Yellow / Appearance: Clear / S.010 / pH:   Gluc:  / Ketone: Negative  / Bili: Negative / Urobili: Negative   Blood:  / Protein: Negative / Nitrite: Negative   Leuk Esterase: Negative / RBC: 0-2 /HPF / WBC 0-2 /HPF   Sq Epi:  / Non Sq Epi: Occasional / Bacteria: Few /HPF      Osmolality, Random Urine: 321 mos/kg (10-19 @ 22:23)  Chloride, Random Urine: 91 mmol/L (10-19 @ 22:23)  Creatinine, Random Urine: <13 mg/dL (10-19 @ 22:23)  Sodium, Random Urine: 70 mmol/L (10-19 @ 22:23)  Osmolality, Random Urine: 207 mos/kg (10-19 @ 09:56)  Sodium, Random Urine: 65 mmol/L (10-19 @ 04:17)  Chloride, Random Urine: 56 mmol/L (10-19 @ 04:17)  Creatinine, Random Urine: <13 mg/dL (10-19 @ 04:17)

## 2017-10-21 NOTE — PROGRESS NOTE ADULT - ATTENDING COMMENTS
Patient with severe protein-calorie malnutrition, likely secondary to malabsorption.  Abnormal duodenal mucosa may be the locus.   Celiac, lymphoma, Giardiasis (? IgA deficiency?).     Will continue support, monitor weight and calorie count, and f/u duodenal biopsy.  GI f/u, as well.

## 2017-10-22 NOTE — PROGRESS NOTE ADULT - SUBJECTIVE AND OBJECTIVE BOX
[facility] NEPHROLOGY- PROGRESS NOTE    60yo M with no significant PMH p/w intermittent chronic diarrhea 10-18 times / day for 1year, +weight loss 70 lbs (1 yr & 3 months), with nausea/ vomiting and decreased PO intake. Pt a/w FTT, hypoglycemia and hyponatremia. s/p CT abd/pelvis wnl.   Pt found to be hyponatremic 121 in the ER. s/p 2L NS bolus and serum sodium increased to 130. Renal consulted for hyponatremia.     Pt c/o abd pain/discomfort/cramping still.  He is unaware of just how cachectic he is.    Hospital Medications: Medications reviewed.  REVIEW OF SYSTEMS: No h/a, cp, sob, dysuria.    VITALS:  T(F): 97.2 (10-22-17 @ 07:34), Max: 98.6 (10-21-17 @ 16:06)  HR: 88 (10-22-17 @ 07:34)  BP: 109/77 (10-22-17 @ 07:34)  RR: 16 (10-22-17 @ 07:34)  SpO2: 100% (10-22-17 @ 07:34)  Wt(kg): --    10-21 @ 07:01  -  10-22 @ 07:00  --------------------------------------------------------  IN: 1200 mL / OUT: 0 mL / NET: 1200 mL      PHYSICAL EXAM:  Gen: Severely cachectic  HEENT: temporal wasting  Cards: RRR, +S1/S2, no M/G/R  Resp: CTA B/L  GI: soft, NT/ND, NABS  : no mckeon  Extremities: no LE edema B/L  MSK: clavicles completely visable, severe muscle wasting    LABS:  10-22    137  |  99  |  9   ----------------------------<  83  3.3<L>   |  31  |  0.25<L>    Ca    7.9<L>      22 Oct 2017 07:01      Creatinine Trend: 0.25 <--, 0.39 <--, 0.27 <--, 0.26 <--, 0.23 <--, 0.37 <--, 0.29 <--, 0.30 <--, 0.40 <--, 0.30 <--, <0.20 <--, 0.30 <--                        12.2   12.0  )-----------( 164      ( 22 Oct 2017 07:01 )             36.4     Urine Studies:  Urinalysis Basic - ( 22 Oct 2017 11:47 )    Color: Yellow / Appearance: Clear / S.010 / pH:   Gluc:  / Ketone: Negative  / Bili: Negative / Urobili: Negative   Blood:  / Protein: Negative / Nitrite: Negative   Leuk Esterase: Negative / RBC:  / WBC    Sq Epi:  / Non Sq Epi:  / Bacteria:       Osmolality, Random Urine: 321 mos/kg (10-19 @ 22:23)  Chloride, Random Urine: 91 mmol/L (10-19 @ 22:23)  Creatinine, Random Urine: <13 mg/dL (10-19 @ 22:23)  Sodium, Random Urine: 70 mmol/L (10-19 @ 22:23)  Osmolality, Random Urine: 207 mos/kg (10-19 @ 09:56)  Sodium, Random Urine: 65 mmol/L (10-19 @ 04:17)  Chloride, Random Urine: 56 mmol/L (10-19 @ 04:17)  Creatinine, Random Urine: <13 mg/dL (10-19 @ 04:17)

## 2017-10-22 NOTE — PROGRESS NOTE ADULT - ATTENDING COMMENTS
Patient is alert and feeling slightly better.  He has asked me to contact his cousin, Dr. Bala Cabrera, and was able to reach him.   Patient is eating a little better, but has developed hyperglycemia.     Malabsorption work up is in progress.    Plan:  GI follow up and biopsy results.

## 2017-10-22 NOTE — PROGRESS NOTE ADULT - PROBLEM SELECTOR PLAN 2
Likely from chronic diarrhea, concern for malignancy.   Concern for TB as patient from [facility]  Indeterminate Gold [facility]antiFeron   CT chest: Mild COPD and small blebs

## 2017-10-22 NOTE — PROGRESS NOTE ADULT - ASSESSMENT
61 year old male from home with no significant PMH presented with chronic abdominal pain, nausea, vomiting and diarrhea for 7 months since Feb 2017. 61 year old male from home with no significant PMH presented with chronic abdominal pain, nausea, vomiting and diarrhea for 7 months since Feb 2017.     Pt has a cousin in [place] who is doctor (Dr. Bala Cabrera [phone]). Tried to reach him today but was unsuccessful. Can retry during the weekdays. 61 year old male from home with no significant PMH presented with chronic abdominal pain, nausea, vomiting and diarrhea for 7 months since Feb 2017. Currently being managed for hypoglycemia. Improving apatite as pt is now eating chocolates. As per Nurse, his intake has improved. Diarrhea is still persistent. Finger sticks are more elevated now.    Pt has a cousin in [place] who is doctor (Dr. Bala Cabrera [phone]). Tried to reach him today but was unsuccessful. Can retry during the weekdays.

## 2017-10-22 NOTE — PROGRESS NOTE ADULT - PROBLEM SELECTOR PLAN 4
Severe PCM in the setting of decreased PO intake. Agree with ensure tid.  Work-up for cause of PCM by primary team.  If etiology unclear and c/w GOC, pt may require a PEG for nutrition.

## 2017-10-22 NOTE — PROGRESS NOTE ADULT - PROBLEM SELECTOR PLAN 3
WBC 9.6 --> 12  Afebrile  F/u UA WBC 9.6 --> 12  Afebrile  H. Pylori negative  F/u UA and C. Diff WBC 9.6 --> 12  Afebrile  H. Pylori negative  F/u UA and C. Diff  F/u Ova, parasites, stool culture and stool fat WBC 9.6 --> 12  Afebrile  H. Pylori negative  F/u C. Diff and Giardiasis  F/u Ova, parasites, stool culture and stool fat

## 2017-10-22 NOTE — PROGRESS NOTE ADULT - PROBLEM SELECTOR PLAN 4
Fluctuating finger sticks  Suspicion of Insulinoma and Glycogen storage diseases   Normal C-Peptide and lactate, Lipid profile  F/u MRI abdomen  F/u Serum Uric acid level   C/w Dextrose + NS+ KCL and D50 pushes as needed False elevated 600 FS reading; Spoke to RN  Fluctuating finger sticks  Suspicion of Insulinoma and Glycogen storage diseases   Normal C-Peptide and lactate, Lipid profile  F/u MRI abdomen  F/u Serum Uric acid level   C/w Dextrose + NS+ KCL and D50 pushes as needed

## 2017-10-22 NOTE — PROGRESS NOTE ADULT - SUBJECTIVE AND OBJECTIVE BOX
PGY 1 Note discussed with supervising resident and primary attending    Patient is a 61y old  Male who presents with a chief complaint of abdominal pain, nausea, vomiting and diarrhea (19 Oct 2017 12:21)      INTERVAL HPI/OVERNIGHT EVENTS: offers no new complaints; current symptoms resolving    MEDICATIONS  (STANDING):  dextrose 5% + sodium chloride 0.9% with potassium chloride 20 mEq/L 1000 milliLiter(s) (100 mL/Hr) IV Continuous <Continuous>  dextrose 5%. 1000 milliLiter(s) (50 mL/Hr) IV Continuous <Continuous>  dextrose 50% Injectable 12.5 Gram(s) IV Push once  dextrose 50% Injectable 25 Gram(s) IV Push once  dextrose 50% Injectable 25 Gram(s) IV Push once  enoxaparin Injectable 40 milliGRAM(s) SubCutaneous daily  fluconAZOLE   Tablet 100 milliGRAM(s) Oral daily  pantoprazole  Injectable 40 milliGRAM(s) IV Push daily    MEDICATIONS  (PRN):  dextrose Gel 1 Dose(s) Oral once PRN Blood Glucose LESS THAN 70 milliGRAM(s)/deciLiter  glucagon  Injectable 1 milliGRAM(s) IntraMuscular once PRN Glucose <70 milliGRAM(s)/deciLiter  glucagon  Injectable 1 milliGRAM(s) IntraMuscular once PRN Glucose <70 milliGRAM(s)/deciLiter  glucagon  Injectable 1 milliGRAM(s) IntraMuscular once PRN Glucose <70 milliGRAM(s)/deciLiter  ondansetron Injectable 4 milliGRAM(s) IV Push every 8 hours PRN Nausea and/or Vomiting      __________________________________________________  REVIEW OF SYSTEMS:    CONSTITUTIONAL: No fever,   EYES: no acute visual disturbances  NECK: No pain or stiffness  RESPIRATORY: No cough; No shortness of breath  CARDIOVASCULAR: No chest pain, no palpitations  GASTROINTESTINAL: No pain. No nausea or vomiting; No diarrhea   NEUROLOGICAL: No headache or numbness, no tremors  MUSCULOSKELETAL: No joint pain, no muscle pain  GENITOURINARY: no dysuria, no frequency, no hesitancy  PSYCHIATRY: no depression , no anxiety  ALL OTHER  ROS negative        Vital Signs Last 24 Hrs  T(C): 36.2 (22 Oct 2017 07:34), Max: 37 (21 Oct 2017 16:06)  T(F): 97.2 (22 Oct 2017 07:34), Max: 98.6 (21 Oct 2017 16:06)  HR: 88 (22 Oct 2017 07:34) (88 - 96)  BP: 109/77 (22 Oct 2017 07:34) (109/77 - 133/84)  BP(mean): --  RR: 16 (22 Oct 2017 07:34) (16 - 18)  SpO2: 100% (22 Oct 2017 07:34) (100% - 100%)    ________________________________________________  PHYSICAL EXAM:  GENERAL: NAD cachectic male  HEENT: Normocephalic;  conjunctivae and sclerae clear; moist mucous membranes;   NECK : supple  CHEST/LUNG: Clear to auscultation bilaterally with good air entry   HEART: S1 S2  regular; no murmurs, gallops or rubs  ABDOMEN: Soft, Nontender, Nondistended; Bowel sounds present  EXTREMITIES: no cyanosis; no edema; no calf tenderness  SKIN: warm and dry; no rash  NERVOUS SYSTEM:  Awake and alert; Oriented  to place, person and time ; no new deficits    _________________________________________________  LABS:                        12.2   12.0  )-----------( 164      ( 22 Oct 2017 07:01 )             36.4     10-22    137  |  99  |  9   ----------------------------<  83  3.3<L>   |  31  |  0.25<L>    Ca    7.9<L>      22 Oct 2017 07:01          CAPILLARY BLOOD GLUCOSE  85 (22 Oct 2017 08:17)  342 (21 Oct 2017 16:48)  233 (21 Oct 2017 11:44)      POCT Blood Glucose.: 85 mg/dL (22 Oct 2017 08:16)  POCT Blood Glucose.: 123 mg/dL (21 Oct 2017 21:11)  POCT Blood Glucose.: 342 mg/dL (21 Oct 2017 16:46)  POCT Blood Glucose.: 233 mg/dL (21 Oct 2017 11:44)        RADIOLOGY & ADDITIONAL TESTS:    Imaging Personally Reviewed:  YES/NO    Consultant(s) Notes Reviewed:   YES/ No    Care Discussed with Consultants :     Plan of care was discussed with patient and /or primary care giver; all questions and concerns were addressed and care was aligned with patient's wishes. PGY 1 Note discussed with supervising resident and primary attending    Patient is a 61y old  Male who presents with a chief complaint of abdominal pain, nausea, vomiting and diarrhea (19 Oct 2017 12:21)      INTERVAL HPI/OVERNIGHT EVENTS: offers no new complaints; current symptoms resolving    MEDICATIONS  (STANDING):  dextrose 5% + sodium chloride 0.9% with potassium chloride 20 mEq/L 1000 milliLiter(s) (100 mL/Hr) IV Continuous <Continuous>  dextrose 5%. 1000 milliLiter(s) (50 mL/Hr) IV Continuous <Continuous>  dextrose 50% Injectable 12.5 Gram(s) IV Push once  dextrose 50% Injectable 25 Gram(s) IV Push once  dextrose 50% Injectable 25 Gram(s) IV Push once  enoxaparin Injectable 40 milliGRAM(s) SubCutaneous daily  fluconAZOLE   Tablet 100 milliGRAM(s) Oral daily  pantoprazole  Injectable 40 milliGRAM(s) IV Push daily    MEDICATIONS  (PRN):  dextrose Gel 1 Dose(s) Oral once PRN Blood Glucose LESS THAN 70 milliGRAM(s)/deciLiter  glucagon  Injectable 1 milliGRAM(s) IntraMuscular once PRN Glucose <70 milliGRAM(s)/deciLiter  glucagon  Injectable 1 milliGRAM(s) IntraMuscular once PRN Glucose <70 milliGRAM(s)/deciLiter  glucagon  Injectable 1 milliGRAM(s) IntraMuscular once PRN Glucose <70 milliGRAM(s)/deciLiter  ondansetron Injectable 4 milliGRAM(s) IV Push every 8 hours PRN Nausea and/or Vomiting      __________________________________________________  REVIEW OF SYSTEMS:    CONSTITUTIONAL: No fever,   EYES: no acute visual disturbances  NECK: No pain or stiffness  RESPIRATORY: No cough; No shortness of breath  CARDIOVASCULAR: No chest pain, no palpitations  GASTROINTESTINAL: No pain. No nausea or vomiting; No diarrhea   NEUROLOGICAL: No headache or numbness, no tremors  MUSCULOSKELETAL: No joint pain, no muscle pain  GENITOURINARY: no dysuria, no frequency, no hesitancy  PSYCHIATRY: no depression , no anxiety  ALL OTHER  ROS negative        Vital Signs Last 24 Hrs  T(C): 36.2 (22 Oct 2017 07:34), Max: 37 (21 Oct 2017 16:06)  T(F): 97.2 (22 Oct 2017 07:34), Max: 98.6 (21 Oct 2017 16:06)  HR: 88 (22 Oct 2017 07:34) (88 - 96)  BP: 109/77 (22 Oct 2017 07:34) (109/77 - 133/84)  BP(mean): --  RR: 16 (22 Oct 2017 07:34) (16 - 18)  SpO2: 100% (22 Oct 2017 07:34) (100% - 100%)    ________________________________________________  PHYSICAL EXAM:  GENERAL: NAD cachectic male  HEENT: Normocephalic;  conjunctivae and sclerae clear; moist mucous membranes;   NECK : supple  CHEST/LUNG: Clear to auscultation bilaterally with good air entry   HEART: S1 S2  regular; no murmurs, gallops or rubs  ABDOMEN: Soft, Nontender, Nondistended; Bowel sounds present  EXTREMITIES: no cyanosis; no edema; no calf tenderness  SKIN: warm and dry; no rash  NERVOUS SYSTEM:  Awake and alert; Oriented  to place, person and time ; no new deficits    _________________________________________________  LABS:                        12.2   12.0  )-----------( 164      ( 22 Oct 2017 07:01 )             36.4     10-22    137  |  99  |  9   ----------------------------<  83  3.3<L>   |  31  |  0.25<L>    Ca    7.9<L>      22 Oct 2017 07:01          CAPILLARY BLOOD GLUCOSE  85 (22 Oct 2017 08:17)  342 (21 Oct 2017 16:48)  233 (21 Oct 2017 11:44)      POCT Blood Glucose.: 85 mg/dL (22 Oct 2017 08:16)  POCT Blood Glucose.: 123 mg/dL (21 Oct 2017 21:11)  POCT Blood Glucose.: 342 mg/dL (21 Oct 2017 16:46)  POCT Blood Glucose.: 233 mg/dL (21 Oct 2017 11:44)      Plan of care was discussed with patient and /or primary care giver; all questions and concerns were addressed and care was aligned with patient's wishes. PGY 1 Note discussed with supervising resident and primary attending    Patient is a 61y old  Male who presents with a chief complaint of abdominal pain, nausea, vomiting and diarrhea (19 Oct 2017 12:21)      INTERVAL HPI/OVERNIGHT EVENTS: offers no new complaints; current symptoms resolving    MEDICATIONS  (STANDING):  dextrose 5% + sodium chloride 0.9% with potassium chloride 20 mEq/L 1000 milliLiter(s) (100 mL/Hr) IV Continuous <Continuous>  dextrose 5%. 1000 milliLiter(s) (50 mL/Hr) IV Continuous <Continuous>  dextrose 50% Injectable 12.5 Gram(s) IV Push once  dextrose 50% Injectable 25 Gram(s) IV Push once  dextrose 50% Injectable 25 Gram(s) IV Push once  enoxaparin Injectable 40 milliGRAM(s) SubCutaneous daily  fluconAZOLE   Tablet 100 milliGRAM(s) Oral daily  pantoprazole  Injectable 40 milliGRAM(s) IV Push daily    MEDICATIONS  (PRN):  dextrose Gel 1 Dose(s) Oral once PRN Blood Glucose LESS THAN 70 milliGRAM(s)/deciLiter  glucagon  Injectable 1 milliGRAM(s) IntraMuscular once PRN Glucose <70 milliGRAM(s)/deciLiter  glucagon  Injectable 1 milliGRAM(s) IntraMuscular once PRN Glucose <70 milliGRAM(s)/deciLiter  glucagon  Injectable 1 milliGRAM(s) IntraMuscular once PRN Glucose <70 milliGRAM(s)/deciLiter  ondansetron Injectable 4 milliGRAM(s) IV Push every 8 hours PRN Nausea and/or Vomiting      __________________________________________________  REVIEW OF SYSTEMS:    CONSTITUTIONAL: No fever,   EYES: no acute visual disturbances  NECK: No pain or stiffness  RESPIRATORY: No cough; No shortness of breath  CARDIOVASCULAR: No chest pain, no palpitations  GASTROINTESTINAL: Belly pain. No nausea or vomiting; + diarrhea   NEUROLOGICAL: No headache or numbness, no tremors  MUSCULOSKELETAL: No joint pain, no muscle pain  GENITOURINARY: no dysuria, no frequency, no hesitancy  PSYCHIATRY: no depression , no anxiety  ALL OTHER  ROS negative        Vital Signs Last 24 Hrs  T(C): 36.2 (22 Oct 2017 07:34), Max: 37 (21 Oct 2017 16:06)  T(F): 97.2 (22 Oct 2017 07:34), Max: 98.6 (21 Oct 2017 16:06)  HR: 88 (22 Oct 2017 07:34) (88 - 96)  BP: 109/77 (22 Oct 2017 07:34) (109/77 - 133/84)  BP(mean): --  RR: 16 (22 Oct 2017 07:34) (16 - 18)  SpO2: 100% (22 Oct 2017 07:34) (100% - 100%)    ________________________________________________  PHYSICAL EXAM:  GENERAL: NAD cachectic male  HEENT: Normocephalic;  conjunctivae and sclerae clear; moist mucous membranes;   NECK : supple  CHEST/LUNG: Clear to auscultation bilaterally with good air entry   HEART: S1 S2  regular; no murmurs, gallops or rubs  ABDOMEN: Soft, Epigastric tenderness, Nondistended; Bowel sounds present  EXTREMITIES: no cyanosis; no edema; no calf tenderness  SKIN: warm and dry; no rash  NERVOUS SYSTEM:  Awake and alert; Oriented x3     _________________________________________________  LABS:                        12.2   12.0  )-----------( 164      ( 22 Oct 2017 07:01 )             36.4     10-22    137  |  99  |  9   ----------------------------<  83  3.3<L>   |  31  |  0.25<L>    Ca    7.9<L>      22 Oct 2017 07:01          CAPILLARY BLOOD GLUCOSE  85 (22 Oct 2017 08:17)  342 (21 Oct 2017 16:48)  233 (21 Oct 2017 11:44)      POCT Blood Glucose.: 85 mg/dL (22 Oct 2017 08:16)  POCT Blood Glucose.: 123 mg/dL (21 Oct 2017 21:11)  POCT Blood Glucose.: 342 mg/dL (21 Oct 2017 16:46)  POCT Blood Glucose.: 233 mg/dL (21 Oct 2017 11:44)      Plan of care was discussed with patient and /or primary care giver; all questions and concerns were addressed and care was aligned with patient's wishes.

## 2017-10-22 NOTE — PROGRESS NOTE ADULT - PROBLEM SELECTOR PLAN 5
Hyponatremia, Hypokalemia and Hypoglycemia  Refeeding syndrome   Concern for adrenal insufficiency.   Elevated random cortisol level of 24  C/w D5 + NS  BMP q6hr  Dr Rosendo Navas Hyponatremia, Hypokalemia and Hypoglycemia  (Resolving with IV fluids)   Refeeding syndrome   Concern for adrenal insufficiency.   Elevated random cortisol level of 24  C/w D5 + NS  BMP q6hr  Dr Rosendo Navas

## 2017-10-22 NOTE — PROGRESS NOTE ADULT - ATTENDING COMMENTS
[facility] NEPHROLOGY  Bob Peñaloza M.D.  Bandar Angel D.O.  Matilde Robbins M.D.  Darlyn Luo, MSN, ANP-C  [phone]    [address] [place] [address]

## 2017-10-23 NOTE — PHYSICAL THERAPY INITIAL EVALUATION ADULT - ACTIVE RANGE OF MOTION EXAMINATION, REHAB EVAL
bilateral upper extremity Active ROM was WFL (within functional limits)/B hip flexion ~1/2 range and B knee extension ~1/2 range

## 2017-10-23 NOTE — PROGRESS NOTE ADULT - PROBLEM SELECTOR PLAN 5
with 70 lb weightloss/ decreased PO intake. HIV neg.    r/o malignancy.   s/p EGD f/u pathology.  Management as per primary team.

## 2017-10-23 NOTE — PHYSICAL THERAPY INITIAL EVALUATION ADULT - ADDITIONAL COMMENTS
Pt reports he would ambulate with physical assist from wife. Pt reports he owns straight cane and rolling walker but is unsure?

## 2017-10-23 NOTE — PROGRESS NOTE ADULT - SUBJECTIVE AND OBJECTIVE BOX
Patient is a 61y old  Male who presents with a chief complaint of abdominal pain, nausea, vomiting and diarrhea (19 Oct 2017 12:21)    61 year old male from home with no significant PMH presented with chronic abdominal pain, nausea, vomiting and diarrhea. Patient stated that he started having pain in his stomach for around  7 months.  He  also complains of  loss of appetite and over a  70 lb  weight loss over 6-8 months.  The patient also complains  of  episodes of nausea  and  vomiting  with occasional diarrhea.  He recently underwent a EGD and colonoscopy in February of this year and is not sure about his results.   REVIEW OF SYSTEMS  Constitutional:   See HPI   HEENT:   No eye pain, no vision changes, no icterus, no mouth ulcers.  Respiratory:   No shortness of breath, no cough, no respiratory distress.   Cardiovascular:   No chest pain, no palpitations.   Gastrointestinal: See HPI   Skin:   No rashes, no jaundice, no eczema.   Musculoskeletal:   No joint pain, no swelling, no myalgia.   Neurologic:   No headache, no seizure, no weakness.   Genitourinary:   No dysuria, no decreased urine output.  Psychiatric:  No depression, no anxiety,   Endocrine:   No thyroid disease, no diabetes.  Heme/Lymphatic:   No anemia, no blood transfusions, no lymph node enlargement, no bleeding, no bruising.  ___________________________________________________________________________________________  Allergies    No Known Allergies    Intolerances      MEDICATIONS  (STANDING):  dextrose 5% + sodium chloride 0.9% with potassium chloride 20 mEq/L 1000 milliLiter(s) (75 mL/Hr) IV Continuous <Continuous>  enoxaparin Injectable 40 milliGRAM(s) SubCutaneous daily  pantoprazole  Injectable 40 milliGRAM(s) IV Push daily  potassium acid phosphate/sodium acid phosphate tablet (K-PHOS No. 2) 1 Tablet(s) Oral four times a day with meals    MEDICATIONS  (PRN):  ondansetron Injectable 4 milliGRAM(s) IV Push every 8 hours PRN Nausea and/or Vomiting      PAST MEDICAL & SURGICAL HISTORY:  Chronic diarrhea  Anorexia  Cachexia  Acid reflux  No significant past surgical history    FAMILY HISTORY:  No pertinent family history in first degree relatives    Social History: No history of : Tobacco use, IVDA, EToH  ______________________________________________________________________________________    PHYSICAL EXAM    Daily     Daily   BMI: 13.2 (10-18 @ 23:52)  Change in Weight:  Vital Signs Last 24 Hrs  T(C): 36.3 (20 Oct 2017 08:12), Max: 36.4 (19 Oct 2017 11:22)  T(F): 97.3 (20 Oct 2017 08:12), Max: 97.6 (20 Oct 2017 00:25)  HR: 73 (20 Oct 2017 08:12) (72 - 110)  BP: 100/70 (20 Oct 2017 08:12) (97/61 - 121/88)  BP(mean): --  RR: 16 (20 Oct 2017 08:12) (14 - 18)  SpO2: 100% (20 Oct 2017 08:12) (97% - 100%)    General:  Severely cachectic   HEENT:    Normal appearance of conjunctiva, ears, nose, lips, oropharynx, and oral mucosa, anicteric.  Neck:  No masses, no asymmetry.  Lymph Nodes:  No lymphadenopathy.   Cardiovascular:  RRR normal S1/S2, no murmur.  Respiratory:  CTA B/L, normal respiratory effort.   Abdominal:   soft, no masses or tenderness, normoactive BS, NT/ND, no HSM.  Extremities:   No clubbing or cyanosis, normal capillary refill, no edema.   Skin:   No rash, jaundice, lesions, eczema.   Musculoskeletal:  No joint swelling, erythema or tenderness.   Neuro: No focal deficits.   Other:   _______________________________________________________________________________________________  Lab Results:                          12.9   13.4  )-----------( 209      ( 20 Oct 2017 06:05 )             38.5     10-20    132<L>  |  97  |  15  ----------------------------<  93  4.2   |  32<H>  |  0.30<L>    Ca    7.8<L>      20 Oct 2017 06:05  Phos  0.8     10-20  Mg     1.9     10-20    TPro  5.6<L>  /  Alb  2.9<L>  /  TBili  0.7  /  DBili  x   /  AST  75<H>  /  ALT  72<H>  /  AlkPhos  89  10-20    LIVER FUNCTIONS - ( 20 Oct 2017 06:05 )  Alb: 2.9 g/dL / Pro: 5.6 g/dL / ALK PHOS: 89 U/L / ALT: 72 U/L DA / AST: 75 U/L / GGT: x           PT/INR - ( 20 Oct 2017 06:05 )   PT: 15.6 sec;   INR: 1.42 ratio         PTT - ( 20 Oct 2017 06:05 )  PTT:38.8 sec        Stool Results:          RADIOLOGY RESULTS:    EXAM:  CT ABDOMEN AND PELVIS OC IC                            PROCEDURE DATE:  10/19/2017          INTERPRETATION:  CLINICAL INFORMATION: Abdominal pain     COMPARISON: September 8, 2017.     PROCEDURE:   CT of the Abdomen and Pelvis was performed with intravenous contrast.   Intravenous contrast: 90 ml Omnipaque 350. 10 ml discarded.   Oral contrast: positive contrast was administered.   Sagittal and coronal reformats were performed.     FINDINGS:     LOWER CHEST: Within normal limits.     LIVER: Within normal limits.   BILE DUCTS: Normal caliber.   GALLBLADDER: Within normal limits.   SPLEEN: Within normal limits.   PANCREAS: Within normal limits.   ADRENALS: Within normal limits.   KIDNEYS/URETERS: Symmetrically enhance without hydronephrosis.     BLADDER: Within normal limits.   REPRODUCTIVE ORGANS: The prostate is within normal limits.     BOWEL: No bowel obstruction. The appendix is not definitely visualized.   PERITONEUM: No ascites.   VESSELS:  Atherosclerotic calcifications. Extensive atheromatous plaque   in the infrarenal abdominal aorta.  RETROPERITONEUM: No lymphadenopathy.   ABDOMINAL WALL: Within normal limits.   BONES: Within normal limits.     IMPRESSION:   No small bowel obstruction or active bowel inflammation.    Nonvisualization of the appendix.                PHILIP GATES M.D., ATTENDING RADIOLOGIST  This document has been electronically signed. Oct 19 2017  6:13AM                SURGICAL PATHOLOGY:

## 2017-10-23 NOTE — PROGRESS NOTE ADULT - PROBLEM SELECTOR PLAN 4
False elevated 600 FS reading; Spoke to RN  Fluctuating finger sticks  Suspicion of Insulinoma and Glycogen storage diseases   Normal C-Peptide and lactate, Lipid profile  F/u MRI abdomen  F/u Serum Uric acid level   C/w Dextrose + NS+ KCL and D50 pushes as needed

## 2017-10-23 NOTE — PROGRESS NOTE ADULT - SUBJECTIVE AND OBJECTIVE BOX
Interval Events:  pt complaints of epigastric pain  cont to have decreased po intake due to pain  does not like ensure    Allergies    No Known Allergies    Intolerances      Endocrine/Metabolic Medications:  dextrose 50% Injectable 12.5 Gram(s) IV Push once  dextrose 50% Injectable 25 Gram(s) IV Push once  dextrose 50% Injectable 25 Gram(s) IV Push once  dextrose Gel 1 Dose(s) Oral once PRN  glucagon  Injectable 1 milliGRAM(s) IntraMuscular once PRN  glucagon  Injectable 1 milliGRAM(s) IntraMuscular once PRN  glucagon  Injectable 1 milliGRAM(s) IntraMuscular once PRN      Vital Signs Last 24 Hrs  T(C): 36.6 (23 Oct 2017 07:29), Max: 36.6 (23 Oct 2017 07:29)  T(F): 97.8 (23 Oct 2017 07:29), Max: 97.8 (23 Oct 2017 07:29)  HR: 91 (23 Oct 2017 07:29) (85 - 91)  BP: 108/77 (23 Oct 2017 07:29) (108/77 - 120/85)  BP(mean): --  RR: 16 (23 Oct 2017 07:29) (16 - 16)  SpO2: 97% (23 Oct 2017 07:29) (97% - 100%)      PHYSICAL EXAM  All physical exam findings normal, except those marked:  General:	Alert, active, cooperative, NAD, well hydrated  .		[] Abnormal:  Neck		Normal: supple, no cervical adenopathy, no palpable thyroid  .		[] Abnormal:  Cardiovascular	Normal: regular rate, normal S1, S2, no murmurs  .		[] Abnormal:  Respiratory	Normal: no chest wall deformity, normal respiratory pattern, CTA B/L  .		[] Abnormal:  Abdominal	Normal: soft, ND, NT, bowel sounds present, no masses, no organomegaly  .		[] Abnormal:  		Normal normal genitalia, testes descended, circumcised/uncircumcised  .		Jennifer stage:			Breast jennifer:  .		Menstrual history:  .		[] Abnormal:  Extremities	Normal: FROM x4  .		[] Abnormal:  Skin		Normal: intact and not indurated, no rash, no acanthosis nigricans  .		[] Abnormal:  Neurologic	Normal: grossly intact  .		[] Abnormal:    LABS                        13.0   15.3  )-----------( 139      ( 23 Oct 2017 07:14 )             38.5                               135    |  101    |  9                   Calcium: 8.4   / iCa: x      (10-23 @ 07:14)    ----------------------------<  90        Magnesium: 1.8                              4.3     |  29     |  0.29             Phosphorous: 1.5        Insulin Level, Serum: 1.8 uU/mL (10.21.17 @ 02:26)    C-Peptide, Serum: 0.6 ng/mL (10.21.17 @ 02:22)          CAPILLARY BLOOD GLUCOSE  99 (22 Oct 2017 22:03)  332 (22 Oct 2017 11:23)      POCT Blood Glucose.: 114 mg/dL (23 Oct 2017 08:21)  POCT Blood Glucose.: 99 mg/dL (22 Oct 2017 21:42)  POCT Blood Glucose.: 135 mg/dL (22 Oct 2017 16:41)  POCT Blood Glucose.: 332 mg/dL (22 Oct 2017 11:23)  POCT Blood Glucose.: >600 mg/dL (22 Oct 2017 11:22)        Assesment/plan   hypoglycemia- due to decrease po intake with both insulin and c- peptide low  gi w/u in progress- + candida on endoscopy  cont fsg monitoring  encourage po intake

## 2017-10-23 NOTE — PROGRESS NOTE ADULT - PROBLEM SELECTOR PLAN 2
Likely from chronic diarrhea, concern for malignancy.   Concern for TB as patient from [facility]  Indeterminate Gold QuantiFeron   CT chest: Mild COPD and small blebs

## 2017-10-23 NOTE — PROGRESS NOTE ADULT - PROBLEM SELECTOR PLAN 1
hypovolemic hyponatremia with poor PO/ solute intake  Hyponatremia resolved. c/w IVF. Monitor serum sodium

## 2017-10-23 NOTE — PROGRESS NOTE ADULT - PROBLEM SELECTOR PLAN 2
in the setting of decreased PO intake. Hypokalemia resolved on IVF.  c/w IVF with KCl repletion. Monitor serum potassium.

## 2017-10-23 NOTE — PROGRESS NOTE ADULT - ATTENDING COMMENTS
[facility] NEPHROLOGY  Bbo Peñaloza M.D.  Bandar Angel D.O.  Matilde Robbins M.D.  Darlyn Luo, MSN, ANP-C  [phone]    [address] [place] [address]

## 2017-10-23 NOTE — PROGRESS NOTE ADULT - PROBLEM SELECTOR PLAN 3
WBC 9.6 --> 12  Afebrile  H. Pylori negative  F/u C. Diff and Giardiasis  F/u Ova, parasites, stool culture and stool fat

## 2017-10-23 NOTE — PROGRESS NOTE ADULT - PROBLEM SELECTOR PLAN 1
Cachectic male with Low BMI  Likely from GI malignancy vs infectious    s/p EGD showed Candida Esophagitis, no mass, f/u biopsy     HIV: Negative  CT abdomen: Normal  FOBT- positive  F/u Ova, parasites, stool culture and stool fat   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   C/w Dextrose + NS+ KCL  Unable to reach wife for medication history, HIPPA form faxed to [facility] at - no results faxed  - Called [facility] at [phone] and requested to fax medical records- 10/20

## 2017-10-23 NOTE — PHYSICAL THERAPY INITIAL EVALUATION ADULT - CRITERIA FOR SKILLED THERAPEUTIC INTERVENTIONS
anticipated equipment needs at discharge/anticipated discharge recommendation/predicted duration of therapy intervention/therapy frequency/impairments found/rehab potential/risk reduction/prevention/functional limitations in following categories

## 2017-10-23 NOTE — PROGRESS NOTE ADULT - ASSESSMENT
61 year old male with unexplained weight loss , anorexia, electrolyte abnormalities nausea and vomiting  Patient is severely malnourished.  Differential diagnoses includes metabolic disorder versus occult malignancy versus infectious.     Patient complaining of pain after eating.   Please when performing MRI of the pancreas also obtain MRA to evaluate the mesenteric vessels

## 2017-10-23 NOTE — PROGRESS NOTE ADULT - SUBJECTIVE AND OBJECTIVE BOX
[facility] NEPHROLOGY- PROGRESS NOTE    62yo M with no significant PMH p/w intermittent chronic diarrhea 10-18 times / day for 1year, +weight loss 70 lbs (1 yr & 3 months), with nausea/ vomiting and decreased PO intake. Pt a/w FTT, hypoglycemia and hyponatremia. s/p CT abd/pelvis wnl.   Pt found to be hyponatremic 121 in the ER. s/p 2L NS bolus and serum sodium increased to 130. Renal consulted for hyponatremia. s/p EGD with candidal esophagitis with abnormal small bowel mucosa s/p biopsy- pathology pending     Pt c/o epigastric pain when eating.   Hospital Medications: Medications reviewed.  REVIEW OF SYSTEMS: No h/a, cp, sob, dysuria. Denies any vomiting. pt c/o multiple episodes of diarrhea    VITALS:  T(F): 97.8 (10-23-17 @ 07:29), Max: 97.8 (10-23-17 @ 07:29)  HR: 87 (10-23-17 @ 12:21)  BP: 127/78 (10-23-17 @ 12:21)  RR: 16 (10-23-17 @ 07:29)  SpO2: 99% (10-23-17 @ 12:21)  Wt(kg): --    PHYSICAL EXAM:  Gen: Severely cachectic  HEENT: temporal wasting  Cards: RRR, +S1/S2, no M/G/R  Resp: CTA B/L  GI: soft, NT ND  : no mckeon  Extremities: no LE edema B/L  MSK: , severe muscle wasting      LABS:  10-23    135  |  101  |  9   ----------------------------<  90  4.3   |  29  |  0.29<L>    Ca    8.4      23 Oct 2017 07:14  Phos  1.5     10-23  Mg     1.8     10-23      Creatinine Trend: 0.29 <--, 0.25 <--, 0.39 <--, 0.27 <--, 0.26 <--, 0.23 <--, 0.37 <--, 0.29 <--, 0.30 <--, 0.40 <--, 0.30 <--, <0.20 <--, 0.30 <--                        13.0   15.3  )-----------( 139      ( 23 Oct 2017 07:14 )             38.5     Urine Studies:  Urinalysis Basic - ( 22 Oct 2017 11:47 )    Color: Yellow / Appearance: Clear / S.010 / pH:   Gluc:  / Ketone: Negative  / Bili: Negative / Urobili: Negative   Blood:  / Protein: Negative / Nitrite: Negative   Leuk Esterase: Negative / RBC:  / WBC    Sq Epi:  / Non Sq Epi:  / Bacteria:       Osmolality, Random Urine: 321 mos/kg (10-19 @ 22:23)  Chloride, Random Urine: 91 mmol/L (10-19 @ 22:23)  Creatinine, Random Urine: <13 mg/dL (10-19 @ 22:23)  Sodium, Random Urine: 70 mmol/L (10-19 @ 22:23)  Osmolality, Random Urine: 207 mos/kg (10-19 @ 09:56)  Sodium, Random Urine: 65 mmol/L (10-19 @ 04:17)  Chloride, Random Urine: 56 mmol/L (10-19 @ 04:17)  Creatinine, Random Urine: <13 mg/dL (10-19 @ 04:17)

## 2017-10-23 NOTE — PROGRESS NOTE ADULT - SUBJECTIVE AND OBJECTIVE BOX
PGY1 Note discussed with supervising resident and primary attending.    Patient is a 61y old  Male who presents with a chief complaint of abdominal pain, nausea, vomiting and diarrhea (19 Oct 2017 12:21)      INTERVAL HPI/OVERNIGHT EVENTS: s/p EGD on last Friday, pt complains dysphagia and upper abd pain after EGD. Dr. Peñaloza consulted , pt may need PEG tube for nutrition.     MEDICATIONS  (STANDING):  dextrose 5% + sodium chloride 0.9% with potassium chloride 20 mEq/L 1000 milliLiter(s) (100 mL/Hr) IV Continuous <Continuous>  dextrose 5%. 1000 milliLiter(s) (50 mL/Hr) IV Continuous <Continuous>  dextrose 50% Injectable 12.5 Gram(s) IV Push once  dextrose 50% Injectable 25 Gram(s) IV Push once  dextrose 50% Injectable 25 Gram(s) IV Push once  enoxaparin Injectable 40 milliGRAM(s) SubCutaneous daily  fluconAZOLE   Tablet 100 milliGRAM(s) Oral daily  pantoprazole  Injectable 40 milliGRAM(s) IV Push daily  potassium chloride    Tablet ER 20 milliEquivalent(s) Oral every 2 hours    MEDICATIONS  (PRN):  dextrose Gel 1 Dose(s) Oral once PRN Blood Glucose LESS THAN 70 milliGRAM(s)/deciLiter  glucagon  Injectable 1 milliGRAM(s) IntraMuscular once PRN Glucose <70 milliGRAM(s)/deciLiter  glucagon  Injectable 1 milliGRAM(s) IntraMuscular once PRN Glucose <70 milliGRAM(s)/deciLiter  glucagon  Injectable 1 milliGRAM(s) IntraMuscular once PRN Glucose <70 milliGRAM(s)/deciLiter  ondansetron Injectable 4 milliGRAM(s) IV Push every 8 hours PRN Nausea and/or Vomiting      Allergies    No Known Allergies    Intolerances        REVIEW OF SYSTEMS:  CONSTITUTIONAL: No fever, weight loss, or fatigue  RESPIRATORY: No cough, wheezing, chills or hemoptysis; No shortness of breath  CARDIOVASCULAR: No chest pain, palpitations, dizziness, or leg swelling  GASTROINTESTINAL: No abdominal or epigastric pain. No nausea, vomiting, or hematemesis; No diarrhea or constipation. No melena or hematochezia.  NEUROLOGICAL: No headaches, memory loss, loss of strength, numbness, or tremors  SKIN: No itching, burning, rashes, or lesions     Vital Signs Last 24 Hrs  T(C): 36.6 (23 Oct 2017 07:29), Max: 36.6 (23 Oct 2017 07:29)  T(F): 97.8 (23 Oct 2017 07:29), Max: 97.8 (23 Oct 2017 07:29)  HR: 91 (23 Oct 2017 07:29) (85 - 91)  BP: 108/77 (23 Oct 2017 07:29) (108/77 - 120/85)  BP(mean): --  RR: 16 (23 Oct 2017 07:29) (16 - 16)  SpO2: 97% (23 Oct 2017 07:29) (97% - 100%)    PHYSICAL EXAM:  GENERAL: NAD, well-groomed, well-developed  HEAD:  Atraumatic, Normocephalic  EYES: EOMI, PERRLA, conjunctiva and sclera clear  NECK: Supple, No JVD, Normal thyroid  CHEST/LUNG: Clear to percussion bilaterally; No rales, rhonchi, wheezing, or rubs  HEART: Regular rate and rhythm; No murmurs, rubs, or gallops  ABDOMEN: Soft, Nontender, Nondistended; Bowel sounds present  NERVOUS SYSTEM:  Alert & Oriented X3, Good concentration; Motor Strength 5/5 B/L   EXTREMITIES:  2+ Peripheral Pulses, No clubbing, cyanosis, or edema  SKIN;    LABS:                        13.0   15.3  )-----------( 139      ( 23 Oct 2017 07:14 )             38.5     10-23    135  |  101  |  9   ----------------------------<  90  4.3   |  29  |  0.29<L>    Ca    8.4      23 Oct 2017 07:14  Phos  1.5     10-23  Mg     1.8     10-23        Urinalysis Basic - ( 22 Oct 2017 11:47 )    Color: Yellow / Appearance: Clear / S.010 / pH: x  Gluc: x / Ketone: Negative  / Bili: Negative / Urobili: Negative   Blood: x / Protein: Negative / Nitrite: Negative   Leuk Esterase: Negative / RBC: x / WBC x   Sq Epi: x / Non Sq Epi: x / Bacteria: x      CAPILLARY BLOOD GLUCOSE  99 (22 Oct 2017 22:03)  332 (22 Oct 2017 11:23)      POCT Blood Glucose.: 114 mg/dL (23 Oct 2017 08:21)  POCT Blood Glucose.: 99 mg/dL (22 Oct 2017 21:42)  POCT Blood Glucose.: 135 mg/dL (22 Oct 2017 16:41)  POCT Blood Glucose.: 332 mg/dL (22 Oct 2017 11:23)  POCT Blood Glucose.: >600 mg/dL (22 Oct 2017 11:22)      RADIOLOGY & ADDITIONAL TESTS:    Imaging Personally Reviewed:  [x ] YES  [ ] NO    Consultant(s) Notes Reviewed:  [x ] YES  [ ] NO

## 2017-10-23 NOTE — PROGRESS NOTE ADULT - PROBLEM SELECTOR PLAN 5
Hyponatremia, Hypokalemia and Hypoglycemia  (Resolving with IV fluids)   Refeeding syndrome   Concern for adrenal insufficiency.   Elevated random cortisol level of 24  C/w D5 + NS  BMP q6hr  Dr. Peñaloza consulted , pt may need PEG tube for Nutrition.  Dr Robbins Nephro

## 2017-10-24 NOTE — PROGRESS NOTE ADULT - ATTENDING COMMENTS
Patient seen and examined this morning around 11 AM; Agree with PGY1 A/P above with editing as needed. d/w PGY1 Dr. Crenshaw    Patient was comfortable in no acute distress, talking with co worker on the phone. Able to tolerate some Ensure with Ice.     P/E: As above  Severe Cachexia  CVs: S1S2 present  Resp: BLAE+, No wheezing or Rhonchi  Extr: No edema or calf tenderness b/l L.E.     Labs: Clotted; will repeat in AM    D/D:  Severe Protein calorie Malnutrition with cachexia concerning for occult upper GI Malignancy   Candida Esophagitis  Elevated 1,25 dihydroxy vitamin D, concern for Granulomatous disease (TB/ Sarcoidosis)  Electrolyte imbalance; Hypophosphatemia  Leucocytosis possibly reactive    Plan:  Await MRI Brain and Abdomen report prior to further course of action; Needs MRA as per GI also which cannot be done in one sitting so d/w Radiology will be done tomorrow. Patient may need Endoscopic USG (EUS) eventually.     Hematology/ Oncology evaluation discussed with Dr. Fuentes; Dr. Gondal to see patient today.   Encouraged patient to take frequent small meals and protein supplement     d/w Pateint's first cousin,  Dr. Bala Cabrera who is a practicing Internist/ cardiologist in [place], [place]. Reviewed patients presenting complaints, findings including EGD findings and biopsy results, further plan of care. Verbalized understanding and agree with the plan. He also informed his wife is originally in [facility] and has Girlfriend here.     Low P replaced orally. Repeat levels in AM    Hypoglycemia has resolved; Decrease IVF to 60 cc/hr    Given elevated 1,25 vit D raises suspicion for Granulomatous disease like Extrapulmonary Sarcoid/ TB etc; Will get ACE level; Quantiferon Gold was indeterminate; Will get PPD placed today; d/w PGY 1/2;       Discussed with patient plan of care  Discussed with PGY 1/2 Dr. Crenshaw/ Dr. Carvalho this morning  Discussed with LUIS ALBERTO Villeda

## 2017-10-24 NOTE — PROGRESS NOTE ADULT - ASSESSMENT
62yo M with no significant PMH p/w intermittent chronic diarrhea 10-18 times / day for 1year, +weight loss 70 lbs (1 yr & 3 months), with nausea/ vomiting and decreased PO intake. Pt a/w FTT, hypoglycemia and hyponatremia. s/p CT abd/pelvis wnl.     Hyponatremia due to poor po solute intake (tea/toast podomania) as well as dehydration.  Now resolved with IV NS.  Rate of sodium rise was a little higher than goal over the first 24hrs, but now has leveled off.  Hypokalemia due to poor intake and possible GI losses. Severe PCM, with underlying cause unclear.

## 2017-10-24 NOTE — CHART NOTE - NSCHARTNOTEFT_GEN_A_CORE
Informed by PGY1 on call; patient has a small Pneumothorax on CXR done little earlier. Discussed with PGy2 Dr. Carvalho it was ordered this morning for routine follow up as patient had intermittent SOB  Patient evaluated by PGY1 on call as well as PGY2 Dr. Carvalho, clinically stable, Sa02 99% on 2 liter NC and hemodynamically stable.  Will request Thoracic Sx evaluation. Keep on Supplemental oxygen. Incentive Spirometry.   Repeat CXR in AM  Decrease IV Fluid to 60 cc/hr.  Discussed with PGY2 plan of care

## 2017-10-24 NOTE — PROGRESS NOTE ADULT - PROBLEM SELECTOR PLAN 1
Repeat 7:30 pm CXR with ?stable vs increased right pneumothorax  Discussed with Dr Sapp, Thoracic, who wants chest tube insertion.  Pt declined chest tube insertion.  Pt understands there could be worsening of pneumothorax, arrythmia/asystole, or death.  Will plan repeat CXR in 3hrs and CT in am  Discussed with Dr Sapp

## 2017-10-24 NOTE — CONSULT NOTE ADULT - SUBJECTIVE AND OBJECTIVE BOX
Patient is a 61y old  Male who presents with a chief complaint of abdominal pain, nausea, vomiting and diarrhea (19 Oct 2017 12:21)      Called see and eval 61y.o. Male w/PMH significant for as below for right pneumothorax. Pt admitted 10/19/17 for anorexia and abd pain work up. Pt states for the past several days he was experiencing shortness of breath. This afternoon, pt states he was unable to speak in full sentences. Nasal cannula placed on patient at 2L, saturation at 100%. Currently, without nasal cannula pt still saturating at 99%. Long smoking history with 1PPD at peak usage. Currently pt states he only takes puffs while on the bathroom to avoid the smell. CT chest performed 10/19/17 showed b/l apical blebs without effusion or cavitary lesions.     PAST MEDICAL & SURGICAL HISTORY:  Chronic diarrhea  Anorexia  Cachexia  Acid reflux  No significant past surgical history      MEDICATIONS  (STANDING):  dextrose 5% + sodium chloride 0.9% with potassium chloride 20 mEq/L 1000 milliLiter(s) (60 mL/Hr) IV Continuous <Continuous>  dextrose 5%. 1000 milliLiter(s) (50 mL/Hr) IV Continuous <Continuous>  dextrose 50% Injectable 12.5 Gram(s) IV Push once  dextrose 50% Injectable 25 Gram(s) IV Push once  dextrose 50% Injectable 25 Gram(s) IV Push once  enoxaparin Injectable 40 milliGRAM(s) SubCutaneous daily  fluconAZOLE   Tablet 100 milliGRAM(s) Oral daily  pantoprazole  Injectable 40 milliGRAM(s) IV Push daily  potassium acid phosphate/sodium acid phosphate tablet (K-PHOS No. 2) 1 Tablet(s) Oral four times a day with meals    MEDICATIONS  (PRN):  dextrose Gel 1 Dose(s) Oral once PRN Blood Glucose LESS THAN 70 milliGRAM(s)/deciLiter  glucagon  Injectable 1 milliGRAM(s) IntraMuscular once PRN Glucose <70 milliGRAM(s)/deciLiter  glucagon  Injectable 1 milliGRAM(s) IntraMuscular once PRN Glucose <70 milliGRAM(s)/deciLiter  glucagon  Injectable 1 milliGRAM(s) IntraMuscular once PRN Glucose <70 milliGRAM(s)/deciLiter  ondansetron Injectable 4 milliGRAM(s) IV Push every 8 hours PRN Nausea and/or Vomiting      Allergies    No Known Allergies    Intolerances        Vital Signs Last 24 Hrs  T(C): 36.3 (24 Oct 2017 17:18), Max: 36.7 (24 Oct 2017 08:35)  T(F): 97.3 (24 Oct 2017 17:18), Max: 98.1 (24 Oct 2017 08:35)  HR: 91 (24 Oct 2017 17:18) (82 - 91)  BP: 105/76 (24 Oct 2017 17:18) (93/69 - 122/70)  BP(mean): --  RR: 17 (24 Oct 2017 17:18) (16 - 17)  SpO2: 100% (24 Oct 2017 17:18) (98% - 100%)    Physical:  Gen: A&Ox3. NAD  Chest: Lungs clear b/l without wheezing, rales, rhonchi. No use of accessory muscle.    I&O's Summary    23 Oct 2017 07:01  -  24 Oct 2017 07:00  --------------------------------------------------------  IN: 0 mL / OUT: 400 mL / NET: -400 mL    LABS:                        13.0   15.3  )-----------( 139      ( 23 Oct 2017 07:14 )             38.5              10-24    130<L>  |  95<L>  |  7   ----------------------------<  61<L>  4.3   |  25  |  <0.20<L>    Ca    8.0<L>      24 Oct 2017 07:01  Phos  2.0     10-24  Mg     1.7     10-24    TPro  5.0<L>  /  Alb  2.7<L>  /  TBili  x   /  DBili  x   /  AST  x   /  ALT  x   /  AlkPhos  x   10-23              RADIOLOGY & ADDITIONAL STUDIES:  < from: Xray Chest 1 View AP- PORTABLE-Urgent (10.24.17 @ 16:55) >  IMPRESSION: Critical finding. Right-sided pneumothorax. Dr. Del Toro   informed with read back at 5:07 PM.    < end of copied text >    < from: CT Chest No Cont (10.19.17 @ 21:30) >  Impression: No evidence for pulmonary infiltrate or consolidation.    Mild COPD.     Multiple small blebsbilaterally.    Small dependent density with inclusion of air in the distal trachea,   likely representing mucus or aspirated material.    < end of copied text >

## 2017-10-24 NOTE — CONSULT NOTE ADULT - PROBLEM SELECTOR RECOMMENDATION 9
-Discussed with thoracic attending and medical attending. To treat conservatively for now with O2 therapy. Will repeat CXR now and, if stable findings, at midnight. Chest tube if either CXR shows worsening. If both CXR stable, will plan for CT chest in AM.  -HOB 90 degrees

## 2017-10-24 NOTE — CHART NOTE - NSCHARTNOTEFT_GEN_A_CORE
Discussed case w/ Dr Engel (house staff) regarding the progression of pneumothorax. After reviewing the CXR, Right pneumothorax fairly stable. Dr. Engel discussed the need for Chest tube insertion w/ the patient, but patient refused any aggressive measures at this time. Risks were explained to the patient including  worsening of pneumothorax, arrythmia/asystole, or death. Pt completely and fulls understands the risks. Will plan repeat CXR in 3hrs and in AM.  Discussed with Surgical housestaff Discussed case w/ Dr Engel (house staff) regarding the progression of pneumothorax. After reviewing the CXR, Right pneumothorax fairly stable. Dr. Engel discussed the need for Chest tube insertion w/ the patient, but patient refused any aggressive measures at this time. Risks were explained to the patient including  worsening of pneumothorax, arrythmia/asystole, or death. Pt completely and fulls understands the risks. Will plan repeat CXR in 3hrs and in AM.  Discussed with Surgical housestaff.

## 2017-10-24 NOTE — CHART NOTE - NSCHARTNOTEFT_GEN_A_CORE
CXR this afternoon shows a roughly 20-25% right pneumothorax.  Pt was seen and examined at bedside, still have SOB, on 2L NC now. Physical exam shows diminished lung sound on the right side. Vitals stable now. Thoracic surgery was called for consulted and surgery will come and see pt soon. CXR this afternoon shows a roughly 20-25% right pneumothorax.  Pt was seen and examined at bedside, still have SOB, on 2L NC now. Physical exam shows diminished lung sound on the right side. Vitals stable now. Thoracic surgery was called for consulted and surgery will come and see pt soon

## 2017-10-24 NOTE — PROGRESS NOTE ADULT - PROBLEM SELECTOR PLAN 1
Cachectic male with Low BMI  Likely from GI malignancy vs infectious    s/p EGD showed Candida Esophagitis, no mass, f/u biopsy     HIV: Negative  CT abdomen: Normal  FOBT- positive  F/u Ova, parasites, stool culture and stool fat   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   f/u MRA abd for mesenteric vessels  C/w Dextrose + NS+ KCL  -colonoscopy negative at [facility]

## 2017-10-24 NOTE — PROGRESS NOTE ADULT - PROBLEM SELECTOR PLAN 2
Likely from chronic diarrhea, concern for malignancy.   Concern for TB as patient from [facility]  Indeterminate Gold QuantiFeron   CT chest: Mild COPD and small blebs

## 2017-10-24 NOTE — PROGRESS NOTE ADULT - SUBJECTIVE AND OBJECTIVE BOX
Patient is doing okay.  No SOB  Pulse ox 100% on RA        Vital Signs Last 24 Hrs  T(C): 36.3 (24 Oct 2017 17:18), Max: 36.7 (24 Oct 2017 08:35)  T(F): 97.3 (24 Oct 2017 17:18), Max: 98.1 (24 Oct 2017 08:35)  HR: 91 (24 Oct 2017 17:18) (82 - 91)  BP: 105/76 (24 Oct 2017 17:18) (93/69 - 122/70)  BP(mean): --  RR: 17 (24 Oct 2017 17:18) (16 - 17)  SpO2: 100% (24 Oct 2017 17:18) (98% - 100%)    Daily     Daily     I&O's Detail    23 Oct 2017 07:01  -  24 Oct 2017 07:00  --------------------------------------------------------  IN:  Total IN: 0 mL    OUT:    Voided: 400 mL  Total OUT: 400 mL    Total NET: -400 mL          MEDICATIONS  (STANDING):  dextrose 5% + sodium chloride 0.9% with potassium chloride 20 mEq/L 1000 milliLiter(s) (60 mL/Hr) IV Continuous <Continuous>  dextrose 5%. 1000 milliLiter(s) (50 mL/Hr) IV Continuous <Continuous>  dextrose 50% Injectable 12.5 Gram(s) IV Push once  dextrose 50% Injectable 25 Gram(s) IV Push once  dextrose 50% Injectable 25 Gram(s) IV Push once  enoxaparin Injectable 40 milliGRAM(s) SubCutaneous daily  fluconAZOLE   Tablet 100 milliGRAM(s) Oral daily  pantoprazole  Injectable 40 milliGRAM(s) IV Push daily  potassium acid phosphate/sodium acid phosphate tablet (K-PHOS No. 2) 1 Tablet(s) Oral four times a day with meals    MEDICATIONS  (PRN):  dextrose Gel 1 Dose(s) Oral once PRN Blood Glucose LESS THAN 70 milliGRAM(s)/deciLiter  glucagon  Injectable 1 milliGRAM(s) IntraMuscular once PRN Glucose <70 milliGRAM(s)/deciLiter  glucagon  Injectable 1 milliGRAM(s) IntraMuscular once PRN Glucose <70 milliGRAM(s)/deciLiter  glucagon  Injectable 1 milliGRAM(s) IntraMuscular once PRN Glucose <70 milliGRAM(s)/deciLiter  ondansetron Injectable 4 milliGRAM(s) IV Push every 8 hours PRN Nausea and/or Vomiting      PHYSICAL EXAM:  GENERAL: In no acute distress  CHEST/LUNG: Clear to percussion bilaterally; Normal respiratory effort  HEART: Regular rate and rhythm  ABDOMEN: Soft, Nontender, Nondistended; Bowel sounds present  EXTREMITIES:  No clubbing, cyanosis, or edema    LABS:                        13.0   15.3  )-----------( 139      ( 23 Oct 2017 07:14 )             38.5     Neutrophil %:   10-24    130<L>  |  95<L>  |  7   ----------------------------<  61<L>  4.3   |  25  |  <0.20<L>    Ca    8.0<L>      24 Oct 2017 07:01  Phos  2.0     10-24  Mg     1.7     10-24    TPro  5.0<L>  /  Alb  2.7<L>  /  TBili  x   /  DBili  x   /  AST  x   /  ALT  x   /  AlkPhos  x   10-23          RADIOLOGY & ADDITIONAL STUDIES:    7;30 pm CXR-  ?stable vs increased right pneumothorax

## 2017-10-24 NOTE — PROGRESS NOTE ADULT - PROBLEM SELECTOR PLAN 4
False elevated 600 FS reading; Spoke to RN  Fluctuating finger sticks  Suspicion of Insulinoma and Glycogen storage diseases   Normal C-Peptide and lactate, Lipid profile  F/u MRI abdomen  F/u Serum Uric acid level   C/w Dextrose + NS+ KCL and D50 pushes as needed False elevated 600 FS reading; Spoke to RN  Fluctuating finger sticks  Suspicion of Insulinoma and Glycogen storage diseases   Normal C-Peptide and lactate, Lipid profile  F/u MRI head, abdomen and MRA abd  F/u Serum Uric acid level   C/w Dextrose + NS+ KCL and D50 pushes as needed

## 2017-10-24 NOTE — CHART NOTE - NSCHARTNOTEFT_GEN_A_CORE
This morning patient was c/o of SOB. Vitals were stable, pulse ox 100%. Started NC and ordered CXR. CXR results uploaded around 4:45pm, showing 20-25% pneumothorax. Evaluated the patient again. He was sitting comfortably on bed, eating meal. He is on 2L O2 via NC. Decreased breath sounds on the lower lobe. Patient denies SOB at this time. Vitals and sats are wnl. Consulted thoracic sugery for evaluation. Will also decrease IVF. Sign out given to the on call team. This morning patient was c/o of SOB. Vitals were stable, pulse ox 100%. Started NC and ordered CXR. CXR results uploaded around 4:45pm, showing 20-25% pneumothorax. Evaluated the patient again. He was sitting comfortably on bed, eating meal. He is on 2L O2 via NC. Decreased breath sounds on the lower lobe. Patient denies SOB at this time. Vitals and sats are wnl. Consulted thoracic sugery for evaluation. Will also decrease IVF. Sign out given to the on call team;

## 2017-10-24 NOTE — PROGRESS NOTE ADULT - SUBJECTIVE AND OBJECTIVE BOX
PGY1 Note discussed with supervising resident and primary attending.    Patient is a 61y old  Male who presents with a chief complaint of abdominal pain, nausea, vomiting and diarrhea (19 Oct 2017 12:21)      INTERVAL HPI/OVERNIGHT EVENTS: Pt still complained abd pain and SOB, given incentive spirometry and Oxygen by NC, will repeat CXR. CBC this am clotted as per lab, will repeat cbc and lactate. Pt endorsed he has 5 episode    MEDICATIONS  (STANDING):  dextrose 5% + sodium chloride 0.9% with potassium chloride 20 mEq/L 1000 milliLiter(s) (125 mL/Hr) IV Continuous <Continuous>  dextrose 5%. 1000 milliLiter(s) (50 mL/Hr) IV Continuous <Continuous>  dextrose 50% Injectable 12.5 Gram(s) IV Push once  dextrose 50% Injectable 25 Gram(s) IV Push once  dextrose 50% Injectable 25 Gram(s) IV Push once  enoxaparin Injectable 40 milliGRAM(s) SubCutaneous daily  fluconAZOLE   Tablet 100 milliGRAM(s) Oral daily  pantoprazole  Injectable 40 milliGRAM(s) IV Push daily  potassium acid phosphate/sodium acid phosphate tablet (K-PHOS No. 2) 1 Tablet(s) Oral four times a day with meals    MEDICATIONS  (PRN):  dextrose Gel 1 Dose(s) Oral once PRN Blood Glucose LESS THAN 70 milliGRAM(s)/deciLiter  glucagon  Injectable 1 milliGRAM(s) IntraMuscular once PRN Glucose <70 milliGRAM(s)/deciLiter  glucagon  Injectable 1 milliGRAM(s) IntraMuscular once PRN Glucose <70 milliGRAM(s)/deciLiter  glucagon  Injectable 1 milliGRAM(s) IntraMuscular once PRN Glucose <70 milliGRAM(s)/deciLiter  ondansetron Injectable 4 milliGRAM(s) IV Push every 8 hours PRN Nausea and/or Vomiting      Allergies    No Known Allergies    Intolerances        REVIEW OF SYSTEMS:  CONSTITUTIONAL: No fever, weight loss, or fatigue  RESPIRATORY: No cough, wheezing, chills or hemoptysis; No shortness of breath  CARDIOVASCULAR: No chest pain, palpitations, dizziness, or leg swelling  GASTROINTESTINAL: No abdominal or epigastric pain. No nausea, vomiting, or hematemesis; No diarrhea or constipation. No melena or hematochezia.  NEUROLOGICAL: No headaches, memory loss, loss of strength, numbness, or tremors  SKIN: No itching, burning, rashes, or lesions     Vital Signs Last 24 Hrs  T(C): 36.7 (24 Oct 2017 08:35), Max: 36.7 (24 Oct 2017 08:35)  T(F): 98.1 (24 Oct 2017 08:35), Max: 98.1 (24 Oct 2017 08:35)  HR: 88 (24 Oct 2017 08:35) (82 - 97)  BP: 93/69 (24 Oct 2017 08:35) (93/69 - 128/85)  BP(mean): --  RR: 17 (24 Oct 2017 08:35) (16 - 17)  SpO2: 100% (24 Oct 2017 08:35) (98% - 100%)    PHYSICAL EXAM:  GENERAL: NAD, well-groomed, well-developed  HEAD:  Atraumatic, Normocephalic  EYES: EOMI, PERRLA, conjunctiva and sclera clear  NECK: Supple, No JVD, Normal thyroid  CHEST/LUNG: Clear to percussion bilaterally; No rales, rhonchi, wheezing, or rubs  HEART: Regular rate and rhythm; No murmurs, rubs, or gallops  ABDOMEN: Soft, Nontender, Nondistended; Bowel sounds present  NERVOUS SYSTEM:  Alert & Oriented X3, Good concentration; Motor Strength 5/5 B/L   EXTREMITIES:  2+ Peripheral Pulses, No clubbing, cyanosis, or edema  SKIN;    LABS:                        13.0   15.3  )-----------( 139      ( 23 Oct 2017 07:14 )             38.5     10-24    130<L>  |  95<L>  |  7   ----------------------------<  61<L>  4.3   |  25  |  <0.20<L>    Ca    8.0<L>      24 Oct 2017 07:01  Phos  2.0     10-24  Mg     1.7     10-24    TPro  5.0<L>  /  Alb  2.7<L>  /  TBili  x   /  DBili  x   /  AST  x   /  ALT  x   /  AlkPhos  x   10-23      Urinalysis Basic - ( 22 Oct 2017 11:47 )    Color: Yellow / Appearance: Clear / S.010 / pH: x  Gluc: x / Ketone: Negative  / Bili: Negative / Urobili: Negative   Blood: x / Protein: Negative / Nitrite: Negative   Leuk Esterase: Negative / RBC: x / WBC x   Sq Epi: x / Non Sq Epi: x / Bacteria: x      CAPILLARY BLOOD GLUCOSE  129 (23 Oct 2017 16:41)      POCT Blood Glucose.: 118 mg/dL (23 Oct 2017 21:17)  POCT Blood Glucose.: 129 mg/dL (23 Oct 2017 16:33)  POCT Blood Glucose.: 130 mg/dL (23 Oct 2017 11:41)      RADIOLOGY & ADDITIONAL TESTS:    Imaging Personally Reviewed:  [ ] YES  [ ] NO    Consultant(s) Notes Reviewed:  [ ] YES  [ ] NO PGY1 Note discussed with supervising resident and primary attending.    Patient is a 61y old  Male who presents with a chief complaint of abdominal pain, nausea, vomiting and diarrhea (19 Oct 2017 12:21)      INTERVAL HPI/OVERNIGHT EVENTS: Pt still complained abd pain and SOB, given incentive spirometry and Oxygen by NC, will repeat CXR.  Pt also endorsed he has 5 episode diarrhea(2 watery, 3 firm) since last night, c/w IVF, increased rate to 125ml.  CBC this am clotted as per lab, will bmp, cbc and lactate tomorrow AM as per . Physical exam: lungs clear, LLQ abd tenderness    MEDICATIONS  (STANDING):  dextrose 5% + sodium chloride 0.9% with potassium chloride 20 mEq/L 1000 milliLiter(s) (125 mL/Hr) IV Continuous <Continuous>  dextrose 5%. 1000 milliLiter(s) (50 mL/Hr) IV Continuous <Continuous>  dextrose 50% Injectable 12.5 Gram(s) IV Push once  dextrose 50% Injectable 25 Gram(s) IV Push once  dextrose 50% Injectable 25 Gram(s) IV Push once  enoxaparin Injectable 40 milliGRAM(s) SubCutaneous daily  fluconAZOLE   Tablet 100 milliGRAM(s) Oral daily  pantoprazole  Injectable 40 milliGRAM(s) IV Push daily  potassium acid phosphate/sodium acid phosphate tablet (K-PHOS No. 2) 1 Tablet(s) Oral four times a day with meals    MEDICATIONS  (PRN):  dextrose Gel 1 Dose(s) Oral once PRN Blood Glucose LESS THAN 70 milliGRAM(s)/deciLiter  glucagon  Injectable 1 milliGRAM(s) IntraMuscular once PRN Glucose <70 milliGRAM(s)/deciLiter  glucagon  Injectable 1 milliGRAM(s) IntraMuscular once PRN Glucose <70 milliGRAM(s)/deciLiter  glucagon  Injectable 1 milliGRAM(s) IntraMuscular once PRN Glucose <70 milliGRAM(s)/deciLiter  ondansetron Injectable 4 milliGRAM(s) IV Push every 8 hours PRN Nausea and/or Vomiting      Allergies    No Known Allergies    Intolerances        REVIEW OF SYSTEMS:  CONSTITUTIONAL: No fever, weight loss, or fatigue  RESPIRATORY: No cough, wheezing, chills or hemoptysis; No shortness of breath  CARDIOVASCULAR: No chest pain, palpitations, dizziness, or leg swelling  GASTROINTESTINAL: No abdominal or epigastric pain. No nausea, vomiting, or hematemesis; No diarrhea or constipation. No melena or hematochezia.  NEUROLOGICAL: No headaches, memory loss, loss of strength, numbness, or tremors  SKIN: No itching, burning, rashes, or lesions     Vital Signs Last 24 Hrs  T(C): 36.7 (24 Oct 2017 08:35), Max: 36.7 (24 Oct 2017 08:35)  T(F): 98.1 (24 Oct 2017 08:35), Max: 98.1 (24 Oct 2017 08:35)  HR: 88 (24 Oct 2017 08:35) (82 - 97)  BP: 93/69 (24 Oct 2017 08:35) (93/69 - 128/85)  BP(mean): --  RR: 17 (24 Oct 2017 08:35) (16 - 17)  SpO2: 100% (24 Oct 2017 08:35) (98% - 100%)    PHYSICAL EXAM:  GENERAL: NAD, well-groomed, well-developed  HEAD:  Atraumatic, Normocephalic  EYES: EOMI, PERRLA, conjunctiva and sclera clear  NECK: Supple, No JVD, Normal thyroid  CHEST/LUNG: Clear to percussion bilaterally; No rales, rhonchi, wheezing, or rubs  HEART: Regular rate and rhythm; No murmurs, rubs, or gallops  ABDOMEN: Soft, Nontender, Nondistended; Bowel sounds present  NERVOUS SYSTEM:  Alert & Oriented X3, Good concentration; Motor Strength 5/5 B/L   EXTREMITIES:  2+ Peripheral Pulses, No clubbing, cyanosis, or edema  SKIN;    LABS:                        13.0   15.3  )-----------( 139      ( 23 Oct 2017 07:14 )             38.5     10-24    130<L>  |  95<L>  |  7   ----------------------------<  61<L>  4.3   |  25  |  <0.20<L>    Ca    8.0<L>      24 Oct 2017 07:01  Phos  2.0     10-24  Mg     1.7     10-24    TPro  5.0<L>  /  Alb  2.7<L>  /  TBili  x   /  DBili  x   /  AST  x   /  ALT  x   /  AlkPhos  x   10-23      Urinalysis Basic - ( 22 Oct 2017 11:47 )    Color: Yellow / Appearance: Clear / S.010 / pH: x  Gluc: x / Ketone: Negative  / Bili: Negative / Urobili: Negative   Blood: x / Protein: Negative / Nitrite: Negative   Leuk Esterase: Negative / RBC: x / WBC x   Sq Epi: x / Non Sq Epi: x / Bacteria: x      CAPILLARY BLOOD GLUCOSE  129 (23 Oct 2017 16:41)      POCT Blood Glucose.: 118 mg/dL (23 Oct 2017 21:17)  POCT Blood Glucose.: 129 mg/dL (23 Oct 2017 16:33)  POCT Blood Glucose.: 130 mg/dL (23 Oct 2017 11:41)      RADIOLOGY & ADDITIONAL TESTS:    Imaging Personally Reviewed:  [x ] YES  [ ] NO    Consultant(s) Notes Reviewed:  [x ] YES  [ ] NO

## 2017-10-24 NOTE — PROGRESS NOTE ADULT - ATTENDING COMMENTS
[facility] NEPHROLOGY  Bob Peñaloza M.D.  Bandar Angel D.O.  Matilde Robbins M.D.  Darlyn Luo, MSN, ANP-C  [phone]    [address] [place] [address]

## 2017-10-24 NOTE — PROGRESS NOTE ADULT - PROBLEM SELECTOR PLAN 1
hypovolemic hyponatremia with poor PO/ solute intake  Hyponatremia mild. Increase IVF rate to 125cc/ hr. Check urine osm and urine sodium. If serum sodium continues to decline, can start NaCl 1g PO daily and titrate as needed. Monitor serum sodium

## 2017-10-25 NOTE — PROGRESS NOTE ADULT - ATTENDING COMMENTS
Patient seen and examined this morning around 11.30 AM; Agree with PGY1 A/P above with editing as needed. d/w PGY1 Dr. Crenshaw    Patient was comfortable in no acute distress, Trying to drink Ensure;     P/E: As above  Severe Cachexia  Neuro: AAO x 3;   CVs: S1S2 present  Resp: BLAE+, No wheezing or Rhonchi  Extr: No edema or calf tenderness b/l L.E.     labs: reviewed; As above    D/D:  Spontaneous Pneumothorax likely due to rupture of bleb associated with underlying COPD, undiagnosed.   Severe Protein calorie Malnutrition with cachexia concerning for occult Malignancy   Candida Esophagitis  Elevated 1,25 dihydroxy vitamin D, concern for Granulomatous disease (TB/ Sarcoidosis)  Electrolyte imbalance replaced  Leucocytosis possibly reactive    Plan:  Informed last evening by PGY1 on call of PTX but patient asymptomatic. Serial CXR was done overnight and patient remained clinically stable.  Repeat CXR this morning persistent PTX; d/w Thoracic Sx recommended Chest tube which was placed this evening after MRA earlier today.  Surgical team did update patient cousin Dr. Bala Cabrera.  MRI Brain and Abdomen insignificant. F/U MRA.    Hematology/ Oncology evaluation discussed with Dr. Fuentes; Dr. Gondal to see patient today.   Encouraged patient to take frequent small meals and protein supplement     Discussed with patient who confirms that he lives with his wife who visits him every night.     Follow up PPD       Discussed with patient plan of care  Discussed with PGY 1/2 Dr. Crenshaw/ Dr. Carvalho this morning  Discussed with LUIS ALBERTO Jones  Discussed with Thoracic Surgery team. Patient seen and examined this morning around 11.30 AM; Agree with PGY1 A/P above with editing as needed. d/w PGY1 Dr. Crenshaw    Patient was comfortable in no acute distress, Trying to drink Ensure;     P/E: As above  Severe Cachexia  Neuro: AAO x 3;   CVs: S1S2 present  Resp: BLAE+, No wheezing or Rhonchi  Extr: No edema or calf tenderness b/l L.E.     labs: reviewed; As above    D/D:  Spontaneous Pneumothorax likely due to rupture of bleb associated with underlying COPD, undiagnosed.   Severe Protein calorie Malnutrition with cachexia concerning for occult Malignancy   Candida Esophagitis  Elevated 1,25 dihydroxy vitamin D, concern for Granulomatous disease (TB/ Sarcoidosis)  Electrolyte imbalance replaced  Leucocytosis possibly reactive    Plan:  Informed last evening by PGY1 on call of PTX but patient asymptomatic. Serial CXR was done overnight and patient remained clinically stable.  Repeat CXR this morning persistent PTX; d/w Thoracic Sx recommended Chest tube which was placed this evening after MRA earlier today.  Surgical team did update patient cousin Dr. Bala Cabrera.  MRI Brain and Abdomen insignificant. F/U MRA.  Repeat CXR; Follow up as per Thoracic Sx recommnedation  Hematology/ Oncology evaluation discussed with Dr. Fuentes; Await evaluation  Encouraged patient to take frequent small meals and protein supplement     Discussed with patient who confirms that he lives with his wife who visits him every night.     Follow up PPD       Discussed with patient plan of care  Discussed with PGY 1/2 Dr. Crenshaw/ Dr. Carvalho this morning  Discussed with LUIS ALBERTO Jones  Discussed with Thoracic Surgery team.

## 2017-10-25 NOTE — PROGRESS NOTE ADULT - PROBLEM SELECTOR PLAN 1
hypovolemic hyponatremia with poor PO/ solute intake  IVF decreased to 60 cc/ hr. Pt with poor solute intake. Agree with starting NaCl 1g PO daily and titrate as needed. Check BMP ~12pm, if remains <129, increase NaCl to 1g PO bid. Monitor serum sodium

## 2017-10-25 NOTE — PROGRESS NOTE ADULT - PROBLEM SELECTOR PLAN 1
1. possible pigtail placement today  2. will d/w pt and patient's family  3. d/w Dr. Sapp and agreed

## 2017-10-25 NOTE — PROGRESS NOTE ADULT - PROBLEM SELECTOR PLAN 2
Likely from chronic diarrhea, concern for malignancy.   Concern for TB as patient from [facility]  Indeterminate Gold QuantiFeron   CT chest: Mild COPD and small blebs  f/u PPD result, PPD was performed at 6pm Tuesday.

## 2017-10-25 NOTE — PROGRESS NOTE ADULT - ATTENDING COMMENTS
[facility] NEPHROLOGY  Bob Peñaloza M.D.  Bandar Angel D.O.  Matilde Robbins M.D.  Darlyn Luo, MSN, ANP-C  [phone]    [address] [place] [address]

## 2017-10-25 NOTE — PROGRESS NOTE ADULT - PROBLEM SELECTOR PLAN 1
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  C/w Dextrose + NS+ KCL  -colonoscopy negative at [facility] Cachectic male with Low BMI  Likely from GI malignancy vs infectious    s/p EGD showed Candida Esophagitis, no mass, f/u biopsy     HIV: Negative  CT abdomen: Normal  FOBT- positive  F/u Ova, parasites, stool culture and stool fat   F/u MRI Abdomen report for suspicion of insulinoma   f/u MRA abd for mesenteric vessels, will be performed today.  C/w Dextrose + NS+ KCL  -colonoscopy negative at [facility]  -MRI head: Multiple foci of chronic small vessel ischemic changes bilaterally.

## 2017-10-25 NOTE — PROGRESS NOTE ADULT - SUBJECTIVE AND OBJECTIVE BOX
PGY1 Note discussed with supervising resident and primary attending.    Patient is a 61y old  Male who presents with a chief complaint of abdominal pain, nausea, vomiting and diarrhea (19 Oct 2017 12:21)      INTERVAL HPI/OVERNIGHT EVENTS: Pt has right pneumothorax seen on CXR at 4:45pm last night. Repeated CXR at 8pm last night and midnight were stable. Surgery consulted, will f/u repeat CXR report in AM. Pt has LLQ abd pain, no SOB/chest pain this morning , on [place] oxygen, talking well. Lung sound diminished on right side. no other overnight events.    MEDICATIONS  (STANDING):  dextrose 5% + sodium chloride 0.9% with potassium chloride 20 mEq/L 1000 milliLiter(s) (60 mL/Hr) IV Continuous <Continuous>  dextrose 5%. 1000 milliLiter(s) (50 mL/Hr) IV Continuous <Continuous>  dextrose 50% Injectable 12.5 Gram(s) IV Push once  dextrose 50% Injectable 25 Gram(s) IV Push once  dextrose 50% Injectable 25 Gram(s) IV Push once  enoxaparin Injectable 40 milliGRAM(s) SubCutaneous daily  fluconAZOLE   Tablet 100 milliGRAM(s) Oral daily  pantoprazole  Injectable 40 milliGRAM(s) IV Push daily  potassium acid phosphate/sodium acid phosphate tablet (K-PHOS No. 2) 1 Tablet(s) Oral four times a day with meals  sodium chloride 1 Gram(s) Oral daily    MEDICATIONS  (PRN):  dextrose Gel 1 Dose(s) Oral once PRN Blood Glucose LESS THAN 70 milliGRAM(s)/deciLiter  glucagon  Injectable 1 milliGRAM(s) IntraMuscular once PRN Glucose <70 milliGRAM(s)/deciLiter  glucagon  Injectable 1 milliGRAM(s) IntraMuscular once PRN Glucose <70 milliGRAM(s)/deciLiter  glucagon  Injectable 1 milliGRAM(s) IntraMuscular once PRN Glucose <70 milliGRAM(s)/deciLiter  ondansetron Injectable 4 milliGRAM(s) IV Push every 8 hours PRN Nausea and/or Vomiting      Allergies    No Known Allergies    Intolerances        REVIEW OF SYSTEMS:  CONSTITUTIONAL: No fever, weight loss, or fatigue  RESPIRATORY: No cough, wheezing, chills or hemoptysis; No shortness of breath  CARDIOVASCULAR: No chest pain, palpitations, dizziness, or leg swelling  GASTROINTESTINAL: No abdominal or epigastric pain. No nausea, vomiting, or hematemesis; No diarrhea or constipation. No melena or hematochezia.  NEUROLOGICAL: No headaches, memory loss, loss of strength, numbness, or tremors  SKIN: No itching, burning, rashes, or lesions     Vital Signs Last 24 Hrs  T(C): 36.6 (25 Oct 2017 08:26), Max: 36.6 (25 Oct 2017 08:26)  T(F): 97.8 (25 Oct 2017 08:26), Max: 97.8 (25 Oct 2017 08:26)  HR: 97 (25 Oct 2017 08:26) (78 - 97)  BP: 103/77 (25 Oct 2017 08:26) (100/71 - 110/68)  BP(mean): --  RR: 18 (25 Oct 2017 08:26) (16 - 18)  SpO2: 95% (25 Oct 2017 08:26) (95% - 100%)    PHYSICAL EXAM:  GENERAL: NAD, well-groomed, well-developed  HEAD:  Atraumatic, Normocephalic  EYES: EOMI, PERRLA, conjunctiva and sclera clear  NECK: Supple, No JVD, Normal thyroid  CHEST/LUNG: Clear to percussion bilaterally; No rales, rhonchi, wheezing, or rubs  HEART: Regular rate and rhythm; No murmurs, rubs, or gallops  ABDOMEN: Soft, Nontender, Nondistended; Bowel sounds present  NERVOUS SYSTEM:  Alert & Oriented X3, Good concentration; Motor Strength 5/5 B/L   EXTREMITIES:  2+ Peripheral Pulses, No clubbing, cyanosis, or edema  SKIN;    LABS:                        11.8   12.5  )-----------( 114      ( 25 Oct 2017 07:27 )             34.1     10-25    129<L>  |  94<L>  |  13  ----------------------------<  78  3.6   |  27  |  0.23<L>    Ca    8.0<L>      25 Oct 2017 07:27  Phos  2.9     10-25  Mg     1.7     10-25    TPro  5.0<L>  /  Alb  2.7<L>  /  TBili  x   /  DBili  x   /  AST  x   /  ALT  x   /  AlkPhos  x   10-23        CAPILLARY BLOOD GLUCOSE  102 (25 Oct 2017 08:26)      POCT Blood Glucose.: 102 mg/dL (25 Oct 2017 08:06)  POCT Blood Glucose.: 73 mg/dL (24 Oct 2017 17:22)      RADIOLOGY & ADDITIONAL TESTS:  < from: MRI Head w/o Cont (10.24.17 @ 20:26) >  FINDINGS:    Brain:  Multiple foci of increased T2 signal seen in bilateral white   matter   consistent with foci of chronic small vessel ischemic changes.  No areas   of   restricted diffusion seen in the brain or cerebellum to suggest acute   infarct.    No areas of abnormal signal on the gradient echo images to suggest   intracranial   hemorrhage.    Ventricles:  Unremarkable.  No ventriculomegaly.    Bones/joints:  Unremarkable.    Sinuses:  Unremarkable as visualized.  No acute sinusitis.    Mastoid air cells:  Unremarkable as visualized.  No mastoid effusion.    Orbits:  Orbits are unremarkable.  No orbital hematoma.  No mass   lesion.  No   proptosis.  Oculomotor muscles and optic nerves are unremarkable.    Sella:  Midline structures are unremarkable.  Sella and suprasellar   cistern   are unremarkable.    IMPRESSION:         Multiple foci ofchronic small vessel ischemic changes bilaterally.    < end of copied text >      Imaging Personally Reviewed:  [x ] YES  [ ] NO    Consultant(s) Notes Reviewed:  [ x] YES  [ ] NO PGY1 Note discussed with supervising resident and primary attending.    Patient is a 61y old  Male who presents with a chief complaint of abdominal pain, nausea, vomiting and diarrhea (19 Oct 2017 12:21)      INTERVAL HPI/OVERNIGHT EVENTS: Pt has right pneumothorax seen on CXR at 4:45pm last night. Repeated CXR at 8pm last night and midnight were stable. Surgery consulted, will f/u repeat CXR report in AM. Pt has LLQ abd pain, no SOB/chest pain this morning , on [place] oxygen, talking well. Lung sound diminished on right side. no other overnight events.    MEDICATIONS  (STANDING):  dextrose 5% + sodium chloride 0.9% with potassium chloride 20 mEq/L 1000 milliLiter(s) (60 mL/Hr) IV Continuous <Continuous>  dextrose 5%. 1000 milliLiter(s) (50 mL/Hr) IV Continuous <Continuous>  dextrose 50% Injectable 12.5 Gram(s) IV Push once  dextrose 50% Injectable 25 Gram(s) IV Push once  dextrose 50% Injectable 25 Gram(s) IV Push once  enoxaparin Injectable 40 milliGRAM(s) SubCutaneous daily  fluconAZOLE   Tablet 100 milliGRAM(s) Oral daily  pantoprazole  Injectable 40 milliGRAM(s) IV Push daily  potassium acid phosphate/sodium acid phosphate tablet (K-PHOS No. 2) 1 Tablet(s) Oral four times a day with meals  sodium chloride 1 Gram(s) Oral daily    MEDICATIONS  (PRN):  dextrose Gel 1 Dose(s) Oral once PRN Blood Glucose LESS THAN 70 milliGRAM(s)/deciLiter  glucagon  Injectable 1 milliGRAM(s) IntraMuscular once PRN Glucose <70 milliGRAM(s)/deciLiter  glucagon  Injectable 1 milliGRAM(s) IntraMuscular once PRN Glucose <70 milliGRAM(s)/deciLiter  glucagon  Injectable 1 milliGRAM(s) IntraMuscular once PRN Glucose <70 milliGRAM(s)/deciLiter  ondansetron Injectable 4 milliGRAM(s) IV Push every 8 hours PRN Nausea and/or Vomiting      Allergies    No Known Allergies    Intolerances        REVIEW OF SYSTEMS:  CONSTITUTIONAL: No fever, weight loss, or fatigue  RESPIRATORY: No cough, wheezing, chills or hemoptysis; No shortness of breath  CARDIOVASCULAR: No chest pain, palpitations, dizziness, or leg swelling  GASTROINTESTINAL: No abdominal or epigastric pain. No nausea, vomiting, or hematemesis; No diarrhea or constipation. No melena or hematochezia.  NEUROLOGICAL: No headaches, memory loss, loss of strength, numbness, or tremors  SKIN: No itching, burning, rashes, or lesions     Vital Signs Last 24 Hrs  T(C): 36.6 (25 Oct 2017 08:26), Max: 36.6 (25 Oct 2017 08:26)  T(F): 97.8 (25 Oct 2017 08:26), Max: 97.8 (25 Oct 2017 08:26)  HR: 97 (25 Oct 2017 08:26) (78 - 97)  BP: 103/77 (25 Oct 2017 08:26) (100/71 - 110/68)  RR: 18 (25 Oct 2017 08:26) (16 - 18)  SpO2: 95% (25 Oct 2017 08:26) (95% - 100%)    PHYSICAL EXAM:  GENERAL: NAD, well-groomed, well-developed  HEAD:  Atraumatic, Normocephalic  EYES: EOMI, PERRLA, conjunctiva and sclera clear  NECK: Supple, No JVD, Normal thyroid  CHEST/LUNG: Clear to percussion bilaterally; No rales, rhonchi, wheezing, or rubs  HEART: Regular rate and rhythm; No murmurs, rubs, or gallops  ABDOMEN: Soft, Nontender, Nondistended; Bowel sounds present  NERVOUS SYSTEM:  Alert & Oriented X3, Good concentration; Motor Strength 5/5 B/L   EXTREMITIES:  2+ Peripheral Pulses, No clubbing, cyanosis, or edema  SKIN;    LABS:                        11.8   12.5  )-----------( 114      ( 25 Oct 2017 07:27 )             34.1     10-25    129<L>  |  94<L>  |  13  ----------------------------<  78  3.6   |  27  |  0.23<L>    Ca    8.0<L>      25 Oct 2017 07:27  Phos  2.9     10-25  Mg     1.7     10-25    TPro  5.0<L>  /  Alb  2.7<L>  /  TBili  x   /  DBili  x   /  AST  x   /  ALT  x   /  AlkPhos  x   10-23        CAPILLARY BLOOD GLUCOSE  102 (25 Oct 2017 08:26)      POCT Blood Glucose.: 102 mg/dL (25 Oct 2017 08:06)  POCT Blood Glucose.: 73 mg/dL (24 Oct 2017 17:22)      RADIOLOGY & ADDITIONAL TESTS:  < from: MRI Head w/o Cont (10.24.17 @ 20:26) >  FINDINGS:    Brain:  Multiple foci of increased T2 signal seen in bilateral white matter consistent with foci of chronic small vessel ischemic changes.  No areas   of restricted diffusion seen in the brain or cerebellum to suggest acute infarct.  No areas of abnormal signal on the gradient echo images to suggest intracranial hemorrhage.    Ventricles:  Unremarkable.  No ventriculomegaly.    Bones/joints:  Unremarkable.    Sinuses:  Unremarkable as visualized.  No acute sinusitis.    Mastoid air cells:  Unremarkable as visualized.  No mastoid effusion.    Orbits:  Orbits are unremarkable.  No orbital hematoma.  No mass lesion.  No proptosis.  Oculomotor muscles and optic nerves are unremarkable.    Sella:  Midline structures are unremarkable.  Sella and suprasellar cistern are unremarkable.    IMPRESSION: Multiple foci ofchronic small vessel ischemic changes bilaterally.    Imaging Personally Reviewed:  [x ] YES  [ ] NO    Consultant(s) Notes Reviewed:  [ x] YES  [ ] NO

## 2017-10-25 NOTE — PROGRESS NOTE ADULT - PROBLEM SELECTOR PLAN 4
False elevated 600 FS reading; Spoke to RN  Fluctuating finger sticks  Suspicion of Insulinoma and Glycogen storage diseases   Normal C-Peptide and lactate, Lipid profile  F/u abdomen and MRA abd  F/u Serum Uric acid level   C/w Dextrose + NS+ KCL and D50 pushes as needed resolved,   Fluctuating finger sticks  Suspicion of Insulinoma and Glycogen storage diseases   Normal C-Peptide and lactate, Lipid profile  F/u MRI/MRA abdomen   F/u Serum Uric acid level   C/w Dextrose + NS+ KCL and D50 pushes as needed

## 2017-10-25 NOTE — PROGRESS NOTE ADULT - SUBJECTIVE AND OBJECTIVE BOX
[facility] NEPHROLOGY- PROGRESS NOTE    62yo M with no significant PMH p/w intermittent chronic diarrhea 10-18 times / day for 1year, +weight loss 70 lbs (1 yr & 3 months), with nausea/ vomiting and decreased PO intake. Pt a/w FTT, hypoglycemia and hyponatremia. s/p CT abd/pelvis wnl.   Pt found to be hyponatremic 121 in the ER. s/p 2L NS bolus and serum sodium increased to 130. Renal consulted for hyponatremia. s/p EGD with candidal esophagitis with abnormal small bowel mucosa s/p biopsy- pathology pending   Hosp cb Right sided pneumothorax- stable. No chest tube for now.     Pt c/o epigastric pain when eating.   Hospital Medications: Medications reviewed.  REVIEW OF SYSTEMS: No h/a, cp, sob, dysuria. Denies any vomiting. pt c/o multiple episodes of diarrhea-stable and epigastric burning pain after eating. Pt denies any SOB and states he had SOB 3 days ago.     VITALS:  T(F): 97.8 (10-25-17 @ 08:26), Max: 97.8 (10-25-17 @ 08:26)  HR: 97 (10-25-17 @ 08:26)  BP: 103/77 (10-25-17 @ 08:26)  RR: 18 (10-25-17 @ 08:26)  SpO2: 95% (10-25-17 @ 08:26)  Wt(kg): --    10-24 @ 07:01  -  10-25 @ 07:00  --------------------------------------------------------  IN: 700 mL / OUT: 700 mL / NET: 0 mL      PHYSICAL EXAM:  Gen: Severely cachectic  HEENT: temporal wasting  Cards: RRR, +S1/S2, no M/G/R  Resp: mild rales at bases  GI: soft, NT ND  : no mckeon  Extremities: no LE edema B/L  MSK: , severe muscle wasting    LABS:  10-25    129<L>  |  94<L>  |  13  ----------------------------<  78  3.6   |  27  |  0.23<L>    Ca    8.0<L>      25 Oct 2017 07:27  Phos  2.9     10-25  Mg     1.7     10-25      Creatinine Trend: 0.23 <--, <0.20 <--, 0.29 <--, 0.25 <--, 0.39 <--, 0.27 <--, 0.26 <--, 0.23 <--, 0.37 <--, 0.29 <--, 0.30 <--, 0.40 <--, 0.30 <--, <0.20 <--, 0.30 <--                        11.8   12.5  )-----------( 114      ( 25 Oct 2017 07:27 )             34.1     Urine Studies:  Urinalysis Basic - ( 22 Oct 2017 11:47 )    Color: Yellow / Appearance: Clear / S.010 / pH:   Gluc:  / Ketone: Negative  / Bili: Negative / Urobili: Negative   Blood:  / Protein: Negative / Nitrite: Negative   Leuk Esterase: Negative / RBC:  / WBC    Sq Epi:  / Non Sq Epi:  / Bacteria:       Sodium, Random Urine: 96 mmol/L (10-24 @ 21:11)  Osmolality, Random Urine: 342 mos/kg (10-24 @ 21:11)  Osmolality, Random Urine: 321 mos/kg (10-19 @ 22:23)  Chloride, Random Urine: 91 mmol/L (10-19 @ 22:23)  Creatinine, Random Urine: <13 mg/dL (10-19 @ 22:23)  Sodium, Random Urine: 70 mmol/L (10-19 @ 22:23)  Osmolality, Random Urine: 207 mos/kg (10-19 @ 09:56)  Sodium, Random Urine: 65 mmol/L (10-19 @ 04:17)  Chloride, Random Urine: 56 mmol/L (10-19 @ 04:17)  Creatinine, Random Urine: <13 mg/dL (10-19 @ 04:17)

## 2017-10-25 NOTE — PROGRESS NOTE ADULT - PROBLEM SELECTOR PLAN 6
Improve score 1 but possible malignancy. Will give Lovenox subcutaneous daily.  Protonix for GI ppx right pneumothorax seen on CXR at 4:45pm last night.   -Repeated CXR at 8pm last night and midnight were stable.   -Surgery consulted, will f/u repeat CXR report in AM.

## 2017-10-25 NOTE — PROGRESS NOTE ADULT - SUBJECTIVE AND OBJECTIVE BOX
Pt seen at bedside  Patient is a 61y old  Male who presents with a chief complaint of abdominal pain, nausea, vomiting and diarrhea (19 Oct 2017 12:21)      INTERVAL HPI/OVERNIGHT EVENTS:  Pt states feels well. Denies chest pain or worsening  shortness of breath at this time.     Vital Signs Last 24 Hrs  T(C): 36.3 (25 Oct 2017 07:43), Max: 36.7 (24 Oct 2017 08:35)  T(F): 97.4 (25 Oct 2017 07:43), Max: 98.1 (24 Oct 2017 08:35)  HR: 92 (25 Oct 2017 07:43) (78 - 92)  BP: 100/71 (25 Oct 2017 07:43) (93/69 - 110/68)  BP(mean): --  RR: 18 (25 Oct 2017 07:43) (16 - 18)  SpO2: 100% (25 Oct 2017 07:43) (96% - 100%)    Physical Exam:    Gen: awake, alert oriented NAD  Chest: equal chest rise    MEDICATIONS  (STANDING):  dextrose 5% + sodium chloride 0.9% with potassium chloride 20 mEq/L 1000 milliLiter(s) (60 mL/Hr) IV Continuous <Continuous>  dextrose 5%. 1000 milliLiter(s) (50 mL/Hr) IV Continuous <Continuous>  dextrose 50% Injectable 12.5 Gram(s) IV Push once  dextrose 50% Injectable 25 Gram(s) IV Push once  dextrose 50% Injectable 25 Gram(s) IV Push once  enoxaparin Injectable 40 milliGRAM(s) SubCutaneous daily  fluconAZOLE   Tablet 100 milliGRAM(s) Oral daily  pantoprazole  Injectable 40 milliGRAM(s) IV Push daily  potassium acid phosphate/sodium acid phosphate tablet (K-PHOS No. 2) 1 Tablet(s) Oral four times a day with meals    MEDICATIONS  (PRN):  dextrose Gel 1 Dose(s) Oral once PRN Blood Glucose LESS THAN 70 milliGRAM(s)/deciLiter  glucagon  Injectable 1 milliGRAM(s) IntraMuscular once PRN Glucose <70 milliGRAM(s)/deciLiter  glucagon  Injectable 1 milliGRAM(s) IntraMuscular once PRN Glucose <70 milliGRAM(s)/deciLiter  glucagon  Injectable 1 milliGRAM(s) IntraMuscular once PRN Glucose <70 milliGRAM(s)/deciLiter  ondansetron Injectable 4 milliGRAM(s) IV Push every 8 hours PRN Nausea and/or Vomiting        10-24    130<L>  |  95<L>  |  7   ----------------------------<  61<L>  4.3   |  25  |  <0.20<L>    Ca    8.0<L>      24 Oct 2017 07:01  Phos  2.0     10-24  Mg     1.7     10-24    TPro  5.0<L>  /  Alb  2.7<L>  /  TBili  x   /  DBili  x   /  AST  x   /  ALT  x   /  AlkPhos  x   10-23        I&O's Detail    24 Oct 2017 07:01  -  25 Oct 2017 07:00  --------------------------------------------------------  IN:    dextrose 5% + sodium chloride 0.9% with potassium chloride 20 mEq/L: 700 mL  Total IN: 700 mL    OUT:    Voided: 700 mL  Total OUT: 700 mL    Total NET: 0 mL          Radiology: Pt seen at bedside  Patient is a 61y old  Male who presents with a chief complaint of abdominal pain, nausea, vomiting and diarrhea (19 Oct 2017 12:21)      INTERVAL HPI/OVERNIGHT EVENTS:  Pt states feels well. Denies chest pain or worsening  shortness of breath at this time.     Vital Signs Last 24 Hrs  T(C): 36.3 (25 Oct 2017 07:43), Max: 36.7 (24 Oct 2017 08:35)  T(F): 97.4 (25 Oct 2017 07:43), Max: 98.1 (24 Oct 2017 08:35)  HR: 92 (25 Oct 2017 07:43) (78 - 92)  BP: 100/71 (25 Oct 2017 07:43) (93/69 - 110/68)  BP(mean): --  RR: 18 (25 Oct 2017 07:43) (16 - 18)  SpO2: 100% (25 Oct 2017 07:43) (96% - 100%)    Physical Exam:    Gen: awake, alert oriented NAD  Chest: equal chest rise    MEDICATIONS  (STANDING):  dextrose 5% + sodium chloride 0.9% with potassium chloride 20 mEq/L 1000 milliLiter(s) (60 mL/Hr) IV Continuous <Continuous>  dextrose 5%. 1000 milliLiter(s) (50 mL/Hr) IV Continuous <Continuous>  dextrose 50% Injectable 12.5 Gram(s) IV Push once  dextrose 50% Injectable 25 Gram(s) IV Push once  dextrose 50% Injectable 25 Gram(s) IV Push once  enoxaparin Injectable 40 milliGRAM(s) SubCutaneous daily  fluconAZOLE   Tablet 100 milliGRAM(s) Oral daily  pantoprazole  Injectable 40 milliGRAM(s) IV Push daily  potassium acid phosphate/sodium acid phosphate tablet (K-PHOS No. 2) 1 Tablet(s) Oral four times a day with meals    MEDICATIONS  (PRN):  dextrose Gel 1 Dose(s) Oral once PRN Blood Glucose LESS THAN 70 milliGRAM(s)/deciLiter  glucagon  Injectable 1 milliGRAM(s) IntraMuscular once PRN Glucose <70 milliGRAM(s)/deciLiter  glucagon  Injectable 1 milliGRAM(s) IntraMuscular once PRN Glucose <70 milliGRAM(s)/deciLiter  glucagon  Injectable 1 milliGRAM(s) IntraMuscular once PRN Glucose <70 milliGRAM(s)/deciLiter  ondansetron Injectable 4 milliGRAM(s) IV Push every 8 hours PRN Nausea and/or Vomiting        10-24    130<L>  |  95<L>  |  7   ----------------------------<  61<L>  4.3   |  25  |  <0.20<L>    Ca    8.0<L>      24 Oct 2017 07:01  Phos  2.0     10-24  Mg     1.7     10-24    TPro  5.0<L>  /  Alb  2.7<L>  /  TBili  x   /  DBili  x   /  AST  x   /  ALT  x   /  AlkPhos  x   10-23        I&O's Detail    24 Oct 2017 07:01  -  25 Oct 2017 07:00  --------------------------------------------------------  IN:    dextrose 5% + sodium chloride 0.9% with potassium chloride 20 mEq/L: 700 mL  Total IN: 700 mL    OUT:    Voided: 700 mL  Total OUT: 700 mL    Total NET: 0 mL          Radiology:  < from: Xray Chest 1 View AP -PORTABLE-Routine (10.25.17 @ 09:25) >  Impression: Grossly stable moderate right pneumothorax.    No evidence for pulmonary consolidation, or pleural effusion.    The trachea is midline.    The cardiac silhouette is within normal limits.    < end of copied text >

## 2017-10-25 NOTE — CONSULT NOTE ADULT - SUBJECTIVE AND OBJECTIVE BOX
Patient is a 61y old  Male who presents with a chief complaint of abdominal pain, nausea, vomiting and diarrhea (19 Oct 2017 12:21). Pt has cachexia and 70 lb wt loss as well  Sx began at least 7m ago.  W/u to date reveals complex lab abnormalities including elev amylase and lipase, hyponatremia, hypokal and hypophos ( severe) ferritin 2385 dec Fe and TIBC and surprisingly nml CRP and ESR. Both PT and PTT are mildly elev. He had mild inc wbc in early Sept and no anemia. he has recently manifested progressive anemia and has developed worsening thrombocytopenia.  He was found to have esoph candida and developed spont PTX while in hosp. Glucose has been low AND high.  Subjective:     REVIEW OF SYSTEMS:    CONSTITUTIONAL: Pos weakness, no fevers or chills  EYES/ENT: No visual changes;  No vertigo or throat pain   NECK: No pain or stiffness  RESPIRATORY: No cough, wheezing, hemoptysis; pos shortness of breath  CARDIOVASCULAR: No chest pain or palpitations  GASTROINTESTINAL: No abdominal or epigastric pain. pos nausea, vomiting,  diarrhea  No melena or hematochezia.  GENITOURINARY: No dysuria, frequency or hematuria  NEUROLOGICAL: No numbness or weakness  SKIN: No itching, burning, rashes, or lesions     MEDICATIONS  (STANDING):  ALBUTerol/ipratropium for Nebulization 3 milliLiter(s) Nebulizer every 6 hours  dextrose 5% + sodium chloride 0.9% with potassium chloride 20 mEq/L 1000 milliLiter(s) (60 mL/Hr) IV Continuous <Continuous>  dextrose 5%. 1000 milliLiter(s) (50 mL/Hr) IV Continuous <Continuous>  dextrose 50% Injectable 12.5 Gram(s) IV Push once  dextrose 50% Injectable 25 Gram(s) IV Push once  dextrose 50% Injectable 25 Gram(s) IV Push once  enoxaparin Injectable 40 milliGRAM(s) SubCutaneous daily  fluconAZOLE   Tablet 100 milliGRAM(s) Oral daily  pantoprazole  Injectable 40 milliGRAM(s) IV Push daily  sodium chloride 1 Gram(s) Oral daily    MEDICATIONS  (PRN):  dextrose Gel 1 Dose(s) Oral once PRN Blood Glucose LESS THAN 70 milliGRAM(s)/deciLiter  glucagon  Injectable 1 milliGRAM(s) IntraMuscular once PRN Glucose <70 milliGRAM(s)/deciLiter  glucagon  Injectable 1 milliGRAM(s) IntraMuscular once PRN Glucose <70 milliGRAM(s)/deciLiter  glucagon  Injectable 1 milliGRAM(s) IntraMuscular once PRN Glucose <70 milliGRAM(s)/deciLiter  ondansetron Injectable 4 milliGRAM(s) IV Push every 8 hours PRN Nausea and/or Vomiting      Allergies    No Known Allergies    Intolerances        Vital Signs Last 24 Hrs  T(C): 36.6 (25 Oct 2017 08:26), Max: 36.6 (25 Oct 2017 08:26)  T(F): 97.8 (25 Oct 2017 08:26), Max: 97.8 (25 Oct 2017 08:26)  HR: 97 (25 Oct 2017 08:26) (78 - 97)  BP: 103/77 (25 Oct 2017 08:26) (100/71 - 110/68)  BP(mean): --  RR: 18 (25 Oct 2017 08:26) (16 - 18)  SpO2: 95% (25 Oct 2017 08:26) (95% - 100%)    PHYSICAL EXAM  General: adult in NAD. Cachectic  HEENT: clear oropharynx, anicteric sclera, pink conjunctiva  Neck: supple  CV: normal S1/S2 with no murmur rubs or gallops  Lungs: positive air movement b/l ant lungs,dec BS R  Abdomen: soft non-tender non-distended, no hepatosplenomegaly  Ext: no clubbing cyanosis or edema  Skin: no rashes and no petechiae  Neuro: alert and oriented X 4, no focal deficits  LABS:                          11.8   12.5  )-----------( 114      ( 25 Oct 2017 07:27 )             34.1         Mean Cell Volume : 94.4 fl  Mean Cell Hemoglobin : 32.6 pg  Mean Cell Hemoglobin Concentration : 34.5 gm/dL  Auto Neutrophil # : x  Auto Lymphocyte # : x  Auto Monocyte # : x  Auto Eosinophil # : x  Auto Basophil # : x  Auto Neutrophil % : x  Auto Lymphocyte % : x  Auto Monocyte % : x  Auto Eosinophil % : x  Auto Basophil % : x    Serial CBC's  10-25 @ 07:27  Hct-34.1 / Hgb-11.8 / Plat-114 / RBC-3.61 / WBC-12.5          Serial CBC's  10-23 @ 07:14  Hct-38.5 / Hgb-13.0 / Plat-139 / RBC-4.03 / WBC-15.3          Serial CBC's  10-22 @ 07:01  Hct-36.4 / Hgb-12.2 / Plat-164 / RBC-3.82 / WBC-12.0            10-25    129<L>  |  94<L>  |  13  ----------------------------<  78  3.6   |  27  |  0.23<L>    Ca    8.0<L>      25 Oct 2017 07:27  Phos  2.9     10-25  Mg     1.7     10-25                Quantitative IgA: 177 mg/dL (10-23 @ 10:42)  Quantitative Ig mg/dL (10-23 @ 10:42)  Quantitative IgM: 170 mg/dL (10-23 @ 10:42)  ANA PAULA Kappa: 0.78 mg/dL (10-23 @ 10:42)  ANA PAULA Lambda: 1.23 mg/dL (10-23 @ 10:42)  Quantitative Ig mg/dL (10-22 @ 12:21)  Quantitative IgA: 166 mg/dL (10-22 @ 12:21)  Quantitative IgM: 164 mg/dL (10-22 @ 12:21)  ANA PAULA Kappa: 0.98 mg/dL (10-22 @ 12:21)  ANA PAULA Lambda: 1.39 mg/dL (10-22 @ 12:21)                      RADIOLOGY & ADDITIONAL STUDIES:

## 2017-10-25 NOTE — PROGRESS NOTE ADULT - PROBLEM SELECTOR PLAN 4
Severe PCM in the setting of decreased PO intake. Agree with ensure tid.  Work-up for cause of PCM by primary team.  If etiology unclear and c/w GOC, pt may require a PEG for nutrition, not TPN.

## 2017-10-25 NOTE — CONSULT NOTE ADULT - ASSESSMENT
61 y.o. M with R pneumothorax, stable
61 year old male from home with no significant PMH presented with chronic abdominal pain, nausea, vomiting and diarrhea. Patient stated that he started having pain in his stomach for 7 months with no relationship to food. He had loss of appetite and loss of weight for 70 lb over this 7 months. The pain is also associated with nausea, vomiting and diarrhea. pt admits to having hypoglycemic sx for the last 2 wks. + postural dizziness
62yo M with no significant PMH p/w intermittent chronic diarrhea 10-18 times / day for 1year, +weight loss 70 lbs (1 yr & 3 months), with nausea/ vomiting and decreased PO intake. Pt a/w FTT, hypoglycemia and hyponatremia. s/p CT abd/pelvis wnl.   Pt found to be hyponatremic 121 in the ER. s/p 2L NS bolus and serum sodium increased to 130. Renal consulted for hyponatremia.
imp/rec    Weight loss w persistent chronic diarrhea and no clear bowel pathology on endoscopy or imaging. Stool O and P is pending though testing for Giardiasis was reportedly neg.  Pancreatic enzymes are increased but pancreas nml on imaging including MRI. GI rec stool elastase to assess panc fx    Chronic mild leucocytosis with acute thrombocytopenia and progressive subacute thrombocytopenia.  The anemia is al least partly sec to chronic dz ( see iron studies). DDX for acute drop in plts includes DIC, HIT sec to Lovenox (uncommon to rare) and a progressive primary marrow dz. Infiltrative marrow dz eg lymphoma also a consideration. Note LDH is elevated though only mildly.  Would stop Lovenox, ch coag profile, HIT ab, beta 2 micro.  Depending on results of above Bmasp/bx may be indicated. Chronicity of sx and nml diff against a rapid moving condition like acute leukemia.  Note nml ESR and CRP are unusual findings in this context.    Elev PT/PTT. Main ddx is Vit K defic and DIC. Will ch coag profile. Suggest empiric oral Vit k 10 mg.    Electrolyte disturbances are mainly sec to diarrhea and poor po intake    PTX sec to blebs/COPD    Esoph buffy is c/w immune defic. HIV neg. CD4/8 ordered. IGG is sl low. SIEP did not show M protein. AKIKO ordered.
61 year old male with unexplained weight loss , anorexia, electrolyte abnormalities nausea and vomiting  Patient is severely malnourished.  Differential diagnoses includes metabolic disorder versus occult malignancy versus infectious.

## 2017-10-25 NOTE — CHART NOTE - NSCHARTNOTEFT_GEN_A_CORE
Discussed CXR done @ 00:53 w/ [facility] over the phone. As per discussion fairly unchanged right sided pneumothorax. Pt seen at bedside, not in acute distress. Pt sleeping comfortably. Will repeat CXR in 4 hours and will continue to monitor and f/up surgery. Discussed CXR done @ 00:53 w/ Night [employer] over the phone. As per discussion fairly unchanged right sided pneumothorax. Pt seen at bedside, not in acute distress. Pt sleeping comfortably. Will repeat CXR in 4 hours and will continue to monitor and f/up surgery;

## 2017-10-25 NOTE — PROGRESS NOTE ADULT - ASSESSMENT
61 year old male from home with no significant PMH presented with chronic abdominal pain, nausea, vomiting and diarrhea for 7 months since Feb 2017. Currently being managed for hypoglycemia. Improving apatite as pt is now eating chocolates. As per Nurse, his intake has improved. Diarrhea is still persistent. Finger sticks are more elevated now.    Pt has a cousin in [place] who is doctor (Dr. Bala Cabrera [phone]). Tried to reach him today but was unsuccessful. Can retry during the weekdays.

## 2017-10-25 NOTE — PROGRESS NOTE ADULT - PROBLEM SELECTOR PLAN 5
Hyponatremia, Hypochlorite   (Resolving with IV fluids)   Refeeding syndrome     Concern for adrenal insufficiency.   Elevated random cortisol level of 24  C/w D5 + NS  BMP q6hr  Dr. Peñaloza consulted , pt may need PEG tube for Nutrition.  Dr Robbins Nephro Hyponatremia, Hypochloride  (Resolving with IV fluids)   Refeeding syndrome   Nacl 1gram daily.  Concern for adrenal insufficiency.   Elevated random cortisol level of 24  C/w D5 + NS  BMP q6hr  Dr. Peñaloza consulted , pt may need PEG tube for Nutrition.  Dr Robbins Nephfranc Hyponatremia, Hypochloride  Refeeding syndrome   Nacl 1gram daily.  Elevated random cortisol level of 24  C/w D5 + NS+KCL  Dr. Peñaloza consulted , pt may need PEG tube for Nutrition.  Dr Rosendo Navas

## 2017-10-25 NOTE — PROCEDURE NOTE - PROCEDURE
<<-----Click on this checkbox to enter Procedure Chest tube placement  10/25/2017  Right sided Pigtail Catheter  Active  MOZGA

## 2017-10-26 PROBLEM — R63.0 ANOREXIA: Chronic | Status: ACTIVE | Noted: 2017-01-01

## 2017-10-26 PROBLEM — R64 CACHEXIA: Chronic | Status: ACTIVE | Noted: 2017-01-01

## 2017-10-26 PROBLEM — K52.9 NONINFECTIVE GASTROENTERITIS AND COLITIS, UNSPECIFIED: Chronic | Status: ACTIVE | Noted: 2017-01-01

## 2017-10-26 NOTE — PROGRESS NOTE ADULT - PROBLEM SELECTOR PLAN 1
on 10/24- patient was complaining of SOB. Vitals and O2 sat on RA was normal. Started him on supplemental O2 and ordered CXR  - CXR- 10/24- moderate R pneumothorax- 20-25%  - Surgery was consulted- however patient refused chest tube on 10/24  - Thoracic re-evaluated on 10/25 pt agreed chest tube. on 10/24- patient was complaining of SOB. Vitals and O2 sat on RA was normal. Started him on supplemental O2 and ordered CXR  - CXR- 10/24- moderate R pneumothorax- 20-25%  - Surgery was consulted- however patient refused chest tube on 10/24  - Thoracic re-evaluated on 10/25 pt agreed for rt pigtail, which was placed.   - Repeat CXR after chest tube and this am shows stable tiny pneumothorax now.  - pt is more comfortable now, denies SOB this AM  - Thoracic surgery on board- recommend continuing pigtail to suction  - f/u repeat CXR tomorrow morning  - CTM

## 2017-10-26 NOTE — PROGRESS NOTE ADULT - ASSESSMENT
61 year old male from home with no significant PMH presented with chronic abdominal pain, nausea, vomiting and diarrhea for 7 months since Feb 2017. On admission, pt was hyponatremic, hypokalemic and hypophosphatemia. His blood sugar has been all over the place. Pt continues has diarrhea and persistent abd pain.  Extensive workup has been done, including EGD, HIV, MRI, MRA. Pt has a cousin in [place] who is doctor (Dr. Bala Cabrera [phone]).

## 2017-10-26 NOTE — PROGRESS NOTE ADULT - PROBLEM SELECTOR PLAN 6
Hyponatremia, Hypochloride  Refeeding syndrome   Nacl 1gram daily.  Elevated random cortisol level of 24  C/w D5 + NS+KCL  Dr. Peñaloza consulted , pt may need PEG tube for Nutrition.  Dr Rosendo Navas

## 2017-10-26 NOTE — H&P ADULT - ATTENDING COMMENTS
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  Comfortable at rest   C/O L abd pain.  Ch pain/weight loss/PO intolerance etc x long time.  Cachectic.  ABd flat.  Soft. Mod tender.  No rebound.  Min guarding.  Appears dry.  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  Athero  Plan: resuscitation, ICU, Gen surg.  TPN if poss.   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  Comfortable at rest   C/O L abd pain/tenderness (same x past weeek).  Ch pain/weight loss/PO intolerance etc x long time.  Cachectic.  ABd flat.  Soft. Mod tender.  No rebound.  Min guarding.  Appears dry.  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  Athero  Plan: resuscitation, ICU, Gen surg.  TPN if poss.   WIlll plan revasc

## 2017-10-26 NOTE — ACUTE INTERFACILITY TRANSFER NOTE - PLAN OF CARE
resolve stenosis Patient will be transferred to [facility] for vascular surgery. Plan to remove the chest tube tomorrow morning, will follow up at [facility]. NaCl 1mg, bid was given, please follow up BMP q12 hrs. Repair of stenosis and relief from Abdominal Pain You was found to have severe malnutrition and resultant Cachexia due to poor oral intake for months.  Suspicion for malignancy especially gastric; Seen by [facility] [place], EGD insignificant except abnormality in small bowel for unclear reason, Biopsies negative for malignancy as well as negative for H. Pylori. CT Chest, Abdomen and Pelvis negative for occult malignancy. Prior Colonoscopy outpatient negative. MRI Abdomen also negative for acute pathology.  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 Vascular surgery consult with Dr. Galdamez. Recommended transfer to [facility] for further surgical management with possible Mesenteric Bypass. Resolution You was found to have a moderate Pneumothorax on a routine CXR done for intermittent shortness of Breath. Remained Hemodynamically stable and not hypoxic. Serial CXR was done and eventually Right chest tube placed. Dr. Becerra (Thoracic surgery) following.  Plan to remove the chest tube tomorrow morning, Thoracic Sx team will follow up at [facility]. Resolved NaCl 1mg, bid was given, please follow up BMP q12 hrs.  Continue IV Fluids  Please follow up by Nephrology team of Dr. Peñaloza/ Dr. Robbins/ Dr. Angel Keep sugars more than 75 Patient with significant hypoglycemia due to no muscle mass or liver storage due to severe protein calorie malnutrition with no storage for gluconeogenesis or Glycogenolysis.   Continue IVF with Dextrose with KCL

## 2017-10-26 NOTE — PROGRESS NOTE ADULT - PROBLEM SELECTOR PLAN 2
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  C/w Dextrose + NS+ KCL  -colonoscopy negative at [facility]  -MRI head: Multiple foci of chronic small vessel ischemic changes bilaterally. 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  C/w Dextrose + NS+ KCL  -colonoscopy negative at [facility]  -MRI head: Multiple foci of chronic small vessel ischemic changes bilaterally.  -Hemo infectious vs IBD vs underline malignancy vs mesenteric ischemia  -s/p EGD showed Candida Esophagitis, no mass, biopsy s/o H.pylori -ve, fungi -ve, malignancy cell -ve.      -HIV: Negative  -Ova, parasites, stool culture negative, and f/u stool fat   -FOBT- positive  - CT w/contrast abdomen: Normal  - MRI abd and MRI head negative.  - f/u MRA abd for mesenteric vessels, which was not done yesterday due to oral contrast for MRI which was given within 24hours.  MRA is being done NOW, will f/u report.  - C/w Dextrose + NS+ KCL  -colonoscopy negative at [facility] (results in chart)  -Hemo/Onc on board: With elevated Pt, PTT and thrombocytopenia- concerns for  DIC, HIT sec to Lovenox (uncommon to rare) and a progressive primary bone marrow disease (Infiltrative bone marrow vs lymphoma). Recommend d/c lovenox (discontinued 10/25). HIT antibody pending.  - Fibrinogen and D-dimer elevated- discussed with Dr Fuentes. He recommends multivitamins, vitamin K, thiamine, and w/u for strongyloides and ascariasis. Will order and f/u

## 2017-10-26 NOTE — PROGRESS NOTE ADULT - PROBLEM SELECTOR PLAN 1
hypovolemic hyponatremia with poor PO/ solute intake  IVF decreased to 60 cc/ hr. Pt with poor solute intake. Consider increasing NaCl to 1g PO bid and titrate as needed. Check BMP ~12pm.  Monitor serum sodium

## 2017-10-26 NOTE — PROGRESS NOTE ADULT - SUBJECTIVE AND OBJECTIVE BOX
PGY1 Note discussed with supervising resident and primary attending.    Patient is a 61y old  Male who presents with a chief complaint of abdominal pain, nausea, vomiting and diarrhea (19 Oct 2017 12:21)      INTERVAL HPI/OVERNIGHT EVENTS: Pt was seen and examined at bedside. Chest tube was placed yesterday afternoon, repeated CXR after procedure and this AM show tiny stable right side pneumothorax . Pt denied SOB, Chest pain. Pt c/o consistent  abd pain, morphine was given but not help much.     MEDICATIONS  (STANDING):  ALBUTerol/ipratropium for Nebulization 3 milliLiter(s) Nebulizer every 6 hours  dextrose 5% + sodium chloride 0.9% with potassium chloride 20 mEq/L 1000 milliLiter(s) (60 mL/Hr) IV Continuous <Continuous>  dextrose 5%. 1000 milliLiter(s) (50 mL/Hr) IV Continuous <Continuous>  dextrose 50% Injectable 12.5 Gram(s) IV Push once  dextrose 50% Injectable 25 Gram(s) IV Push once  dextrose 50% Injectable 25 Gram(s) IV Push once  fluconAZOLE   Tablet 100 milliGRAM(s) Oral daily  pantoprazole  Injectable 40 milliGRAM(s) IV Push daily  simethicone 80 milliGRAM(s) Chew daily  sodium chloride 1 Gram(s) Oral daily    MEDICATIONS  (PRN):  aluminum hydroxide/magnesium hydroxide/simethicone Suspension 30 milliLiter(s) Oral every 4 hours PRN Dyspepsia  dextrose Gel 1 Dose(s) Oral once PRN Blood Glucose LESS THAN 70 milliGRAM(s)/deciLiter  glucagon  Injectable 1 milliGRAM(s) IntraMuscular once PRN Glucose <70 milliGRAM(s)/deciLiter  glucagon  Injectable 1 milliGRAM(s) IntraMuscular once PRN Glucose <70 milliGRAM(s)/deciLiter  glucagon  Injectable 1 milliGRAM(s) IntraMuscular once PRN Glucose <70 milliGRAM(s)/deciLiter  morphine  - Injectable 1 milliGRAM(s) IV Push every 6 hours PRN Moderate Pain (4 - 6)  ondansetron Injectable 4 milliGRAM(s) IV Push every 8 hours PRN Nausea and/or Vomiting      Allergies    No Known Allergies    Intolerances        REVIEW OF SYSTEMS:  CONSTITUTIONAL: No fever, weight loss, or fatigue  RESPIRATORY: No cough, wheezing, chills or hemoptysis; No shortness of breath  CARDIOVASCULAR: No chest pain, palpitations, dizziness, or leg swelling  GASTROINTESTINAL: No abdominal or epigastric pain. No nausea, vomiting, or hematemesis; No diarrhea or constipation. No melena or hematochezia.  NEUROLOGICAL: No headaches, memory loss, loss of strength, numbness, or tremors  SKIN: No itching, burning, rashes, or lesions     Vital Signs Last 24 Hrs  T(C): 36.3 (26 Oct 2017 08:49), Max: 36.4 (26 Oct 2017 01:05)  T(F): 97.3 (26 Oct 2017 08:49), Max: 97.5 (26 Oct 2017 01:05)  HR: 99 (26 Oct 2017 08:49) (89 - 99)  BP: 138/83 (26 Oct 2017 08:49) (134/85 - 138/83)  BP(mean): --  RR: 17 (26 Oct 2017 08:49) (17 - 18)  SpO2: 100% (26 Oct 2017 08:49) (97% - 100%)    PHYSICAL EXAM:  GENERAL: NAD, well-groomed, well-developed  HEAD:  Atraumatic, Normocephalic  EYES: EOMI, PERRLA, conjunctiva and sclera clear  NECK: Supple, No JVD, Normal thyroid  CHEST/LUNG: Clear to percussion bilaterally; No rales, rhonchi, wheezing, or rubs  HEART: Regular rate and rhythm; No murmurs, rubs, or gallops  ABDOMEN: Soft, Nontender, Nondistended; Bowel sounds present  NERVOUS SYSTEM:  Alert & Oriented X3, Good concentration; Motor Strength 5/5 B/L   EXTREMITIES:  2+ Peripheral Pulses, No clubbing, cyanosis, or edema  SKIN;    LABS:                        11.7   10.1  )-----------( 119      ( 26 Oct 2017 10:33 )             35.3     10-26    131<L>  |  96  |  32<H>  ----------------------------<  54<L>  4.6   |  30  |  0.33<L>    Ca    8.2<L>      26 Oct 2017 10:33  Phos  3.3     10-26  Mg     2.0     10-26          CAPILLARY BLOOD GLUCOSE      POCT Blood Glucose.: 56 mg/dL (26 Oct 2017 11:27)  POCT Blood Glucose.: 62 mg/dL (26 Oct 2017 08:21)  POCT Blood Glucose.: 202 mg/dL (25 Oct 2017 11:42)      RADIOLOGY & ADDITIONAL TESTS:  < from: Xray Chest 1 View AP -PORTABLE-Routine (10.26.17 @ 09:46) >  IMPRESSION:  Right right pigtail chest tube remains in place. Tiny right pneumothorax   is unchanged.    < end of copied text >    < from: Xray Chest 1 View AP-PORTABLE IMMEDIATE (10.25.17 @ 16:29) >  IMPRESSION:  Interval right chest tube placement with significant reduction of   moderate right pneumothorax. Tiny apical right pneumothorax remains.    < end of copied text >      Imaging Personally Reviewed:  [x ] YES  [ ] NO    Consultant(s) Notes Reviewed:  [ x] YES  [ ] NO

## 2017-10-26 NOTE — ACUTE INTERFACILITY TRANSFER NOTE - CARE PROVIDERS DIRECT ADDRESSES
,[email or web address],DirectAddress_Unknown

## 2017-10-26 NOTE — PROGRESS NOTE ADULT - PROBLEM SELECTOR PLAN 4
Severe PCM in the setting of decreased PO intake. 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 The HARI is not visualized, likely occluded.  Pt to be transferred to [facility] for intervention stent vs bypass.   Can consider TPN.

## 2017-10-26 NOTE — H&P ADULT - NSHPLABSRESULTS_GEN_ALL_CORE
7.7    10.95 )-----------( 76       ( 26 Oct 2017 19:10 )             22.2     26 Oct 2017 19:10    129    |  94     |  42     ----------------------------<  166    4.9     |  18     |  0.42     Ca    7.1        26 Oct 2017 19:10  Phos  4.6       26 Oct 2017 19:10  Mg     2.0       26 Oct 2017 19:10    TPro  x      /  Alb  2.0    /  TBili  x      /  DBili  x      /  AST  x      /  ALT  x      /  AlkPhos  x      26 Oct 2017 19:10    LIVER FUNCTIONS - ( 26 Oct 2017 19:10 )  Alb: 2.0 g/dL / Pro: x     / ALK PHOS: x     / ALT: x     / AST: x     / GGT: x           PT/INR - ( 26 Oct 2017 11:22 )   PT: 16.7 sec;   INR: 1.52 ratio         PTT - ( 26 Oct 2017 10:33 )  PTT:32.6 sec  CAPILLARY BLOOD GLUCOSE      POCT Blood Glucose.: 132 mg/dL (26 Oct 2017 19:21)  POCT Blood Glucose.: 110 mg/dL (26 Oct 2017 18:10)  POCT Blood Glucose.: 31 mg/dL (26 Oct 2017 17:55)  POCT Blood Glucose.: 12 mg/dL (26 Oct 2017 17:53)  POCT Blood Glucose.: 56 mg/dL (26 Oct 2017 11:27)  POCT Blood Glucose.: 62 mg/dL (26 Oct 2017 08:21)

## 2017-10-26 NOTE — PROGRESS NOTE ADULT - ATTENDING COMMENTS
[facility] NEPHROLOGY  Bob Peñaloza M.D.  Bandar Angel D.O.  Matilde Robbins M.D.  Darlyn Luo, MSN, ANP-C  [phone]    [address] [place] [address]

## 2017-10-26 NOTE — PROGRESS NOTE ADULT - PROBLEM SELECTOR PLAN 5
with 70 lb weightloss/ decreased PO intake. HIV neg.    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 The HARI is not visualized, likely occluded.  Pt to be transferred to [facility] for intervention stent vs bypass.   Management as per primary team.

## 2017-10-26 NOTE — CONSULT NOTE ADULT - SUBJECTIVE AND OBJECTIVE BOX
GENERAL SURGERY CONSULT NOTE    Patient is a 61y old  Male who presents with a chief complaint of abdominal pain (26 Oct 2017 19:23)      HPI:  61M transferred from [facility] for vascular surgery evaluation of celiac stenosis and SMA occlusion in the setting of cachexia and abdominal pain. Patient was initially admitted to [facility] for diarrhea and abdominal pain 1 week ago. On admission, patient was hyponatremic, hypokalemic and hypophospatemic, with variable blood glucoses ranging from 39 - 300. EGD was performed and was significant for gastritis, but no mass or malignancy. Colonoscopy done earlier this year (around March according to patient's spouse at [facility]) had no significant findings. He was empirically started on fluconazole for possible esophageal candidiasis but fungal stains were negative on biopsy and flucoanozole was discontinued. HIV negative. 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 Of note, patient had a right sided pneumothorax on 10/24, chest tube was placed on 10/25, and there is a plan to remove chest tube on 10/27. On arrival at [facility], patient had an RRT for hypoglycemia with glucose of 12. He responded to 2 amps of D50 and a full set of labs were sent. Pt was found to be in liver failure, malnurished, coagulopathic, hyponatremic, and with an elevated lactate to 6.4.      PAST MEDICAL & SURGICAL HISTORY:  Chronic diarrhea  Anorexia  Cachexia  Acid reflux  No significant past surgical history    [  ] No significant past history as reviewed with the patient and family    FAMILY HISTORY:  No pertinent family history in first degree relatives    [  ] Family history not pertinent as reviewed with the patient and family    SOCIAL HISTORY:    MEDICATIONS  (STANDING):  dextrose 10% + sodium chloride 0.9% 1000 milliLiter(s) (50 mL/Hr) IV Continuous <Continuous>  dextrose 5%. 1000 milliLiter(s) (50 mL/Hr) IV Continuous <Continuous>  dextrose 50% Injectable 12.5 Gram(s) IV Push once  dextrose 50% Injectable 25 Gram(s) IV Push once  dextrose 50% Injectable 25 Gram(s) IV Push once  enoxaparin Injectable 40 milliGRAM(s) SubCutaneous every 24 hours  influenza   Vaccine 0.5 milliLiter(s) IntraMuscular once  multivitamin/thiamine/folic acid in sodium chloride 0.9% 1000 milliLiter(s) (100 mL/Hr) IV Continuous <Continuous>  phytonadione  IVPB 5 milliGRAM(s) IV Intermittent once  thiamine IVPB 500 milliGRAM(s) IV Intermittent daily    MEDICATIONS  (PRN):  dextrose Gel 1 Dose(s) Oral once PRN Blood Glucose LESS THAN 70 milliGRAM(s)/deciLiter  glucagon  Injectable 1 milliGRAM(s) IntraMuscular once PRN Glucose <70 milliGRAM(s)/deciLiter    Allergies    No Known Allergies    Intolerances        Vital Signs Last 24 Hrs  T(C): 36 (26 Oct 2017 17:33), Max: 36.8 (26 Oct 2017 14:52)  T(F): 96.8 (26 Oct 2017 17:33), Max: 98.2 (26 Oct 2017 14:52)  HR: 100 (26 Oct 2017 20:47) (89 - 104)  BP: 104/69 (26 Oct 2017 20:47) (104/69 - 139/95)  BP(mean): --  RR: 20 (26 Oct 2017 20:47) (17 - 20)  SpO2: 100% (26 Oct 2017 20:47) (97% - 100%)  Daily Height in cm: 182.88 (26 Oct 2017 17:33)    Daily     Exam:  General: awake, alert, NAD, cachectic  HEENT: NCAT, MMM  Resp: nonlabored  Chest: equal chest rise, R pigtail in place to suction  Abd: soft, LLQ and RLQ tenderness, no rebound, no guarding, no distention  Ext: MAGAÑA  Neuro: intact                          7.7    10.95 )-----------( 76       ( 26 Oct 2017 19:10 )             22.2     10-26    129<L>  |  94<L>  |  42<H>  ----------------------------<  166<H>  4.9   |  18<L>  |  0.42<L>    Ca    7.1<L>      26 Oct 2017 19:10  Phos  4.6     10-26  Mg     2.0     10-26    TPro  3.8<L>  /  Alb  2.0<L>  /  TBili  0.9  /  DBili  0.3<H>  /  AST  1120<H>  /  ALT  687<H>  /  AlkPhos  57  10-26    PT/INR - ( 26 Oct 2017 19:00 )   PT: 27.7 SEC;   INR: 2.43          PTT - ( 26 Oct 2017 19:00 )  PTT:42.7 SEC  Urinalysis Basic - ( 26 Oct 2017 19:00 )    Color: YELLOW / Appearance: CLEAR / S.033 / pH: 6.0  Gluc: 250 / Ketone: NEGATIVE  / Bili: NEGATIVE / Urobili: NORMAL E.U.   Blood: NEGATIVE / Protein: 100 / Nitrite: NEGATIVE   Leuk Esterase: NEGATIVE / RBC: 0-2 / WBC 2-5   Sq Epi: x / Non Sq Epi: x / Bacteria: x        IMAGING STUDIES:    INTERPRETATION:  Abdominal MRA    Indication: Abdominal pain after eating.    Technique: Utilizing a 1.5 Henna high-field magnet, mesenteric MRA is   performed without and with IV contrast (7 cc Gadavist administered, 3 cc   discarded) using 3-D fat suppressed gradient echo technique, supplemented   by precontrast Trufisp images. Subsequent maximum intensity injections   are also reconstructed for evaluation.    Comparison: No prior abdominal MR is available for comparison. Reference   is made with a previous abdominal CT dated 10/19/2017.    Findings: No evidence for aortic dissection, or aneurysm. There is a   high-grade stenosis at the proximal celiac axis artery. There is   occlusion at the proximal SMA. The HARI is not visualized, likely occluded.    At the right common iliac artery, there is a 7 mm saccular aneurysm or   pseudoaneurysm (image 41 series 11).    The portal veins, splenic vein and mesenteric veins are not well   visualized on postcontrast VIBE images due to respiratory motion artifact.    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 Occlusion at   the proximal SMA. The HARI is not visualized, likely occluded.    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

## 2017-10-26 NOTE — PROGRESS NOTE ADULT - SUBJECTIVE AND OBJECTIVE BOX
Patient is a 61y old  Male who presents with a chief complaint of abdominal pain, nausea, vomiting and diarrhea (19 Oct 2017 12:21)    has significant cachexia, now has ptx       MEDICATIONS  (STANDING):  ALBUTerol/ipratropium for Nebulization 3 milliLiter(s) Nebulizer every 6 hours  dextrose 5% + sodium chloride 0.9% with potassium chloride 20 mEq/L 1000 milliLiter(s) (60 mL/Hr) IV Continuous <Continuous>  dextrose 5%. 1000 milliLiter(s) (50 mL/Hr) IV Continuous <Continuous>  dextrose 50% Injectable 12.5 Gram(s) IV Push once  dextrose 50% Injectable 25 Gram(s) IV Push once  dextrose 50% Injectable 25 Gram(s) IV Push once  fluconAZOLE   Tablet 100 milliGRAM(s) Oral daily  multivitamin/minerals 1 Tablet(s) Oral daily  pantoprazole  Injectable 40 milliGRAM(s) IV Push daily  simethicone 80 milliGRAM(s) Chew daily  sodium chloride 1 Gram(s) Oral two times a day  thiamine 100 milliGRAM(s) Oral daily    MEDICATIONS  (PRN):  aluminum hydroxide/magnesium hydroxide/simethicone Suspension 30 milliLiter(s) Oral every 4 hours PRN Dyspepsia  dextrose Gel 1 Dose(s) Oral once PRN Blood Glucose LESS THAN 70 milliGRAM(s)/deciLiter  glucagon  Injectable 1 milliGRAM(s) IntraMuscular once PRN Glucose <70 milliGRAM(s)/deciLiter  glucagon  Injectable 1 milliGRAM(s) IntraMuscular once PRN Glucose <70 milliGRAM(s)/deciLiter  glucagon  Injectable 1 milliGRAM(s) IntraMuscular once PRN Glucose <70 milliGRAM(s)/deciLiter  morphine  - Injectable 1 milliGRAM(s) IV Push every 6 hours PRN Moderate Pain (4 - 6)  ondansetron Injectable 4 milliGRAM(s) IV Push every 8 hours PRN Nausea and/or Vomiting      Allergies    No Known Allergies    Intolerances        Vital Signs Last 24 Hrs  T(C): 36.3 (26 Oct 2017 08:49), Max: 36.4 (26 Oct 2017 01:05)  T(F): 97.3 (26 Oct 2017 08:49), Max: 97.5 (26 Oct 2017 01:05)  HR: 99 (26 Oct 2017 08:49) (89 - 99)  BP: 138/83 (26 Oct 2017 08:49) (134/85 - 138/83)  BP(mean): --  RR: 17 (26 Oct 2017 08:49) (17 - 18)  SpO2: 100% (26 Oct 2017 08:49) (97% - 100%)    PHYSICAL EXAM  General: adult in significant distress  HEENT: clear oropharynx, anicteric sclera, pink conjunctiva  Neck: supple  CV: normal S1/S2 with no murmur rubs or gallops  Lungs: decreased air movements  Abdomen: cachectic  Ext: no clubbing cyanosis or edema  Skin: no rashes and no petechiae  Neuro: alert       LABS:                          11.7   10.1  )-----------( 119      ( 26 Oct 2017 10:33 )             35.3         Mean Cell Volume : 94.4 fl  Mean Cell Hemoglobin : 31.4 pg  Mean Cell Hemoglobin Concentration : 33.2 gm/dL  Auto Neutrophil # : x  Auto Lymphocyte # : x  Auto Monocyte # : x  Auto Eosinophil # : x  Auto Basophil # : x  Auto Neutrophil % : x  Auto Lymphocyte % : x  Auto Monocyte % : x  Auto Eosinophil % : x  Auto Basophil % : x      Serial CBC's  10-26 @ 10:33  Hct-35.3 / Hgb-11.7 / Plat-119 / RBC-3.74 / WBC-10.1  Serial CBC's  10-25 @ 07:27  Hct-34.1 / Hgb-11.8 / Plat-114 / RBC-3.61 / WBC-12.5  Serial CBC's  10-23 @ 07:14  Hct-38.5 / Hgb-13.0 / Plat-139 / RBC-4.03 / WBC-15.3      10-26    131<L>  |  96  |  32<H>  ----------------------------<  54<L>  4.6   |  30  |  0.33<L>    Ca    8.2<L>      26 Oct 2017 10:33  Phos  3.3     10-26  Mg     2.0     10-26        PT/INR - ( 26 Oct 2017 11:22 )   PT: 16.7 sec;   INR: 1.52 ratio         PTT - ( 26 Oct 2017 10:33 )  PTT:32.6 sec    Reticulocyte Percent: 0.4 % (10-20 @ 09:56)  Ferritin, Serum: 2385.0 ng/mL (10-20 @ 09:53)  Folate, Serum: 12.3 ng/mL (10-20 @ 09:53)  Vitamin B12, Serum: >2000 pg/mL (10-20 @ 09:53)  Iron - Total Binding Capacity.: 199 ug/dL (10-20 @ 06:05)      Serum Protein Electrophoresis Interp: Normal Electrophoresis Pattern (10-23 @ 10:42)  Quantitative IgA: 177 mg/dL (10-23 @ 10:42)  Quantitative Ig mg/dL (10-23 @ 10:42)  Quantitative IgM: 170 mg/dL (10-23 @ 10:42)  Immunofixation, Serum: No Monoclonal Band Identified (10-23 @ 10:42)  ANA PAULA Kappa: 0.78 mg/dL (10-23 @ 10:42)  ANA PAULA Lambda: 1.23 mg/dL (10-23 @ 10:42)  Quantitative Ig mg/dL (10-22 @ 12:21)  Quantitative IgA: 166 mg/dL (10-22 @ 12:21)  Quantitative IgM: 164 mg/dL (10-22 @ 12:21)  ANA PAULA Kappa: 0.98 mg/dL (10-22 @ 12:21)  ANA PAULA Lambda: 1.39 mg/dL (10-22 @ 12:21)

## 2017-10-26 NOTE — H&P ADULT - ASSESSMENT
61M transferred from [facility] for vascular surgery for SMA occlusion, history of hypoglycemia    - Monitor FSG  - Monitor pain  - F/u labs  - SICU consult

## 2017-10-26 NOTE — PROGRESS NOTE ADULT - SUBJECTIVE AND OBJECTIVE BOX
[facility] NEPHROLOGY- PROGRESS NOTE    60yo M with no significant PMH p/w intermittent chronic diarrhea 10-18 times / day for 1year, +weight loss 70 lbs (1 yr & 3 months), with nausea/ vomiting and decreased PO intake. Pt a/w FTT, hypoglycemia and hyponatremia. s/p CT abd/pelvis wnl.   Pt found to be hyponatremic 121 in the ER. s/p 2L NS bolus and serum sodium increased to 130. Renal consulted for hyponatremia. s/p EGD with candidal esophagitis with abnormal small bowel mucosa s/p biopsy- pathology pending   Hosp cb Right sided pneumothorax s/p rt chest Tube. s/p MRS with high grade SMA stenosis and possible HARI stenosis.     Pt c/o epigastric pain when eating.   Hospital Medications: Medications reviewed.  REVIEW OF SYSTEMS: No h/a, cp, sob, dysuria. +n/v/ d. +epigastric pain while eating.     VITALS:  T(F): 97.3 (10-26-17 @ 08:49), Max: 97.5 (10-26-17 @ 01:05)  HR: 99 (10-26-17 @ 08:49)  BP: 138/83 (10-26-17 @ 08:49)  RR: 17 (10-26-17 @ 08:49)  SpO2: 100% (10-26-17 @ 08:49)  Wt(kg): --    10-25 @ 07:01  -  10-26 @ 07:00  --------------------------------------------------------  IN: 60 mL / OUT: 0 mL / NET: 60 mL      PHYSICAL EXAM:  Gen: Severely cachectic  HEENT: temporal wasting  Cards: RRR, +S1/S2, no M/G/R  Resp: CTA anteriorly  GI: soft, NT ND  : no mckeon  Extremities: no LE edema B/L  MSK: severe muscle wasting    LABS:  10-26    131<L>  |  96  |  32<H>  ----------------------------<  54<L>  4.6   |  30  |  0.33<L>    Ca    8.2<L>      26 Oct 2017 10:33  Phos  3.3     10-26  Mg     2.0     10-26      Creatinine Trend: 0.33 <--, 0.23 <--, <0.20 <--, 0.29 <--, 0.25 <--, 0.39 <--, 0.27 <--, 0.26 <--, 0.23 <--, 0.37 <--, 0.29 <--, 0.30 <--, 0.40 <--, 0.30 <--                        11.7   10.1  )-----------( 119      ( 26 Oct 2017 10:33 )             35.3     Urine Studies:  Urinalysis Basic - ( 22 Oct 2017 11:47 )    Color: Yellow / Appearance: Clear / S.010 / pH:   Gluc:  / Ketone: Negative  / Bili: Negative / Urobili: Negative   Blood:  / Protein: Negative / Nitrite: Negative   Leuk Esterase: Negative / RBC:  / WBC    Sq Epi:  / Non Sq Epi:  / Bacteria:       Sodium, Random Urine: 96 mmol/L (10-24 @ 21:11)  Osmolality, Random Urine: 342 mos/kg (10-24 @ 21:11)  Osmolality, Random Urine: 321 mos/kg (10-19 @ 22:23)  Chloride, Random Urine: 91 mmol/L (10-19 @ 22:23)  Creatinine, Random Urine: <13 mg/dL (10-19 @ 22:23)  Sodium, Random Urine: 70 mmol/L (10-19 @ 22:23)    < from: MRA Abdomen w/Cont (10.26.17 @ 12:50) >  XAM:  MRA ABDOMEN, W  CONT                            PROCEDURE DATE:  10/26/2017          INTERPRETATION:  Abdominal MRA    Indication: Abdominal pain after eating.    Technique: Utilizing a 1.5 Henna high-field magnet, mesenteric MRA is   performed without and with IV contrast (7 cc Gadavist administered, 3 cc   discarded) using 3-D fat suppressed gradient echo technique, supplemented   by precontrast Trufisp images. Subsequent maximum intensity injections   are also reconstructed for evaluation.    Comparison: No prior abdominal MR is available for comparison. Reference   is made with a previous abdominal CT dated 10/19/2017.    Findings: No evidence for aortic dissection, or aneurysm. There is a   high-grade stenosis at the proximal celiac axis artery. There is   occlusion at the proximal SMA. The HARI is not visualized, likely occluded.    At the right common iliac artery, there is a 7 mm saccular aneurysm or   pseudoaneurysm (image 41 series 11).    The portal veins, splenic vein and mesenteric veins are not well   visualized on postcontrast VIBE images due to respiratory motion artifact.    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 Occlusion at   the proximal SMA. The HARI is not visualized, likely occluded.    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      < end of copied text >

## 2017-10-26 NOTE — ACUTE INTERFACILITY TRANSFER NOTE - CARE PROVIDER_API CALL
Markus Galdamez), Vascular Surgery  [address] [facility]  [address] [place] [address]  Phone: [phone]  Fax: [phone] Markus Galdamez), Vascular Surgery  [address] [facility]  [address] [place] [address]  Phone: [phone]  Fax: [phone] Markus Galdamez), Vascular Surgery  [address] [facility]  [address] [place] [address]  Phone: [phone]  Fax: [phone]    Bandar Angel (), Internal Medicine  [address] [place], [place] [address]  Phone: [phone]  Fax: [phone]

## 2017-10-26 NOTE — ACUTE INTERFACILITY TRANSFER NOTE - HOSPITAL COURSE
patient is a 61 year old male from home with no significant PMH presented with chronic abdominal pain, nausea, vomiting and diarrhea for 7 months since Feb 2017. On admission, pt was hyponatremic, hypokalemic and hypophosphatemia. His blood sugar has been all over the place. Pt continues has diarrhea and persistent abd pain.  Extensive workup has been done, including EGD, HIV, MRI negative.  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 Patient will be transferred to [facility] for vascular surgery. Patient has right side pneumothorax on 10/24, chest tube was placed 10/25, and plan to remove chest tube tomorrow, please follow up at [facility]. Patient also has hyponatremia, NaCl 1mg, bid was given. patient is a 61 year old male from home with no significant PMH presented with chronic abdominal pain, nausea, vomiting and diarrhea for 7 months since Feb 2017. On admission, pt was hyponatremic, hypokalemic and hypophosphatemia. His blood sugar has been all over the place. Pt continues has diarrhea and persistent abd pain.  Extensive workup has been done, including EGD, HIV, MRI negative.  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 Patient will be transferred to [facility] for vascular surgery. Patient has right side pneumothorax on 10/24, chest tube was placed 10/25, and plan to remove chest tube tomorrow, please follow up at [facility]. Patient also has hyponatremia, NaCl 1mg, bid was given.    Patient will be transferred to [facility] for vascular surgery by Dr. Galdamez  today. Discussed transfer plan with  and he agreed with the transfer plan. Patient is a 61 year old male from home with no significant PMH presented with chronic abdominal pain, nausea, vomiting and diarrhea for 7 months since Feb 2017. On admission, pt was hyponatremic, hypokalemic and hypophosphatemia. His blood sugars have been highly variable of D5 NS ranging from 39 to ~300. Pt continues to have diarrhea and persistent abdominal pain.  Extensive workup has been done, including EGD which was significant for gastritis, but no mass, or malignancy. Colonoscopy done outpatient was insignificant. He was empirically started on fluconazole for possible esophageal candidiasis but fungal stains were negative on biopsy. Fluconazole discontinued.  HIV negative.  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 Patient will be transferred to [facility] for vascular surgery. Patient has right side pneumothorax on 10/24, chest tube was placed 10/25, and plan to remove chest tube tomorrow 10/27 per surgery recommendations.  Patient also has hyponatremia, NaCl 1mg, bid was given.      Patient will be transferred to [facility] for vascular surgery by Dr. Galdamez  today. Discussed transfer plan with  and he agreed with the transfer plan.

## 2017-10-26 NOTE — ACUTE INTERFACILITY TRANSFER NOTE - NS MD INTERFACILITY TRANSFER INST FT
patient is a 61 year old male from home with no significant PMH presented with chronic abdominal pain, nausea, vomiting and diarrhea for 7 months since Feb 2017. On admission, pt was hyponatremic, hypokalemic and hypophosphatemia. His blood sugar has been all over the place. Pt continues has diarrhea and persistent abd pain.  Extensive workup has been done, including EGD: EGD showed Candida Esophagitis, no mass, biopsy s/o H.pylori , fungi ,malignancy cell all negative.  HIV, MRI negative.  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 Patient will be transferred to [facility] for vascular surgery. Patient has right side pneumothorax on 10/24, chest tube was placed 10/25, and plan to remove chest tube tomorrow, please follow up at [facility]. Patient also has hyponatremia, NaCl 1mg, bid was given.      Pt will be transferred to [facility] for vascular surgery by Dr. Galdamez  today. Discussed transfer plan with  and he agreed with the transfer plan. patient is a 61 year old male from home with no significant PMH presented with chronic abdominal pain, nausea, vomiting and diarrhea for 7 months since Feb 2017. On admission, pt was hyponatremic, hypokalemic and hypophosphatemia. His blood sugar has been all over the place. Pt continues has diarrhea and persistent abd pain.  Extensive workup has been done, including EGD: EGD showed Candida Esophagitis, no mass, biopsy s/o H.pylori , fungi ,malignancy cell all negative.  HIV, MRI negative.  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 Patient will be transferred to [facility] for vascular surgery. Patient has right side pneumothorax on 10/24, chest tube was placed 10/25, and plan to remove chest tube tomorrow, please follow up at [facility]. Patient also has hyponatremia, NaCl 1mg, bid was given. Patient is a 61 year old male from home with no significant PMH presented with chronic abdominal pain, nausea, vomiting and diarrhea for 7 months since Feb 2017. On admission, pt was hyponatremic, hypokalemic and hypophosphatemia. His blood sugars have been highly variable of D5 NS ranging from 39 to ~300. Pt continues to have p diarrhea and persistent abd pain.  Extensive workup has been done, including EGD which was significant for gastritis, but no mass, or malignancy. He was empirically started on fluconazole for possible esophagus candidiasis but fungal stains were negative. Fluconazole discontinues.  HIV negative.  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 Patient will be transferred to [facility] for vascular surgery. Patient has right side pneumothorax on 10/24, chest tube was placed 10/25, and plan to remove chest tube tomorrow 10/27 per surgery recommendations.  Patient also has hyponatremia, NaCl 1mg, bid was given. 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 Patient will be transferred to [facility] for vascular surgery.  - Patient has right side pneumothorax on 10/24, chest tube placed 10/25, and plan to remove chest tube tomorrow 10/27 per surgery recommendations.    - Patient also has hyponatremia, currently on Salt tabs 1g bid. Continue as needed.

## 2017-10-26 NOTE — PROGRESS NOTE ADULT - ASSESSMENT
Significant cachexia with mild cytopenia  unclear diagnosis    Imaging is not suggestive of any malignancy. GI workup underway. Colonoscopy?  Immunofixation negative  LDH marginally elevated  Await AKIKO  Recommend check for CEA    will follow

## 2017-10-26 NOTE — PROGRESS NOTE ADULT - PROBLEM SELECTOR PLAN 4
WBC 9.6 --> 12  Afebrile  H. Pylori negative  C. Diff and Giardiasis negative  Ova, parasites, stool culture and stool fat negative

## 2017-10-26 NOTE — CONSULT NOTE ADULT - SUBJECTIVE AND OBJECTIVE BOX
HPI:  61M transferred from [facility] for vascular surgery. Patient was initially admitted for diarrhea and abdominal pain, a week ago at [facility]. On admission, patient was hyponatremic, hypokalemic and hypophospatemic, with varable blood glucoses ranging from 39 - 300. EGD was signififcant for gastritis, but no mass or malignancy. Colonoscopy done earlier this year (around March according to patient's spouse) was insignificant.. He was empirically started on fluconazole for possible esophageal candidiasis but fungal stains were negative on biopsy and flucoanozole was discontinued. HIV negative. 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 Patient was transferred to [facility] today for surgery. Of note, patient had a right sided pneumothorax on 10/24, chest tube was placed on 10/25, and there is a plan to remove chest tube on 10/27. Patient has been getting 1g NaCl BID for hyponatremia. (26 Oct 2017 19:23)    SICU consult called for hypoglycemia, cachexia, and failure to thrive. Lactate 6.4.     Allergies:  No Known Allergies    Intolerances      MEDICATIONS  (STANDING):  dextrose 10% + sodium chloride 0.9% 1000 milliLiter(s) (50 mL/Hr) IV Continuous <Continuous>  dextrose 5%. 1000 milliLiter(s) (50 mL/Hr) IV Continuous <Continuous>  dextrose 50% Injectable 12.5 Gram(s) IV Push once  dextrose 50% Injectable 25 Gram(s) IV Push once  dextrose 50% Injectable 25 Gram(s) IV Push once  enoxaparin Injectable 40 milliGRAM(s) SubCutaneous every 24 hours  influenza   Vaccine 0.5 milliLiter(s) IntraMuscular once  multivitamin/thiamine/folic acid in sodium chloride 0.9% 1000 milliLiter(s) (100 mL/Hr) IV Continuous <Continuous>  phytonadione  IVPB 5 milliGRAM(s) IV Intermittent once  thiamine IVPB 500 milliGRAM(s) IV Intermittent daily    MEDICATIONS  (PRN):  dextrose Gel 1 Dose(s) Oral once PRN Blood Glucose LESS THAN 70 milliGRAM(s)/deciLiter  glucagon  Injectable 1 milliGRAM(s) IntraMuscular once PRN Glucose <70 milliGRAM(s)/deciLiter      PAST MEDICAL & SURGICAL HISTORY:  Chronic diarrhea  Anorexia  Cachexia  Acid reflux  No significant past surgical history      FAMILY HISTORY:  No pertinent family history in first degree relatives      REVIEW OF SYSTEMS    General: recent weight loss and anorexia. No fevers.     Respiratory and Thorax: Denied cough, tachypnea, SOB.   	  Cardiovascular:	NO palpitations or chest pain.     Gastrointestinal:	Occasional nausea, no vomiting. lower quadrant abdominal pain. No recent diarrhea.     Genitourinary: No frequency or burning.     Neurological: No headaches.         ICU Vital Signs Last 24 Hrs  T(C): 36 (26 Oct 2017 17:33), Max: 36.8 (26 Oct 2017 14:52)  T(F): 96.8 (26 Oct 2017 17:33), Max: 98.2 (26 Oct 2017 14:52)  HR: 100 (26 Oct 2017 20:47) (89 - 104)  BP: 104/69 (26 Oct 2017 20:47) (104/69 - 139/95)  BP(mean): --  ABP: --  ABP(mean): --  RR: 20 (26 Oct 2017 20:47) (17 - 20)  SpO2: 100% (26 Oct 2017 20:47) (97% - 100%)    I&O's Detail    26 Oct 2017 07:01  -  26 Oct 2017 22:41  --------------------------------------------------------  IN:  Total IN: 0 mL    OUT:    Voided: 400 mL  Total OUT: 400 mL    Total NET: -400 mL    Physical Exam:     Constitutional:  patient cachectic. NAD.     Respiratory: clear bilaterally, no wheezes / rhonchi.     Cardiovascular: S1S2, reg rate    Gastrointestinal: soft, scaphoid, tender to palpation bilateral lower quadrants, L > R. No rebound / guarding.     Extremities: No edema. NV intact. Strength 4/5.         LABS:                        7.7    10.95 )-----------( 76       ( 26 Oct 2017 19:10 )             22.2     129<L>  |  94<L>  |  42<H>  ----------------------------<  166<H>  4.9   |  18<L>  |  0.42<L>    Ca    7.1<L>      26 Oct 2017 19:10  Phos  4.6     10-26  Mg     2.0     10-26    TPro  3.8<L>  /  Alb  2.0<L>  /  TBili  0.9  /  DBili  0.3<H>  /  AST  1120<H>  /  ALT  687<H>  /  AlkPhos  57  10-26    PT/INR - ( 26 Oct 2017 19:00 )   PT: 27.7 SEC;   INR: 2.43        PTT - ( 26 Oct 2017 19:00 )  PTT:42.7 SEC    POCT Blood Glucose.: 79 mg/dL (26 Oct 2017 21:10)    POCT Blood Glucose.: 132 mg/dL  (10.26.17 @ 19:21)

## 2017-10-26 NOTE — PROGRESS NOTE ADULT - PROBLEM SELECTOR PLAN 5
resolved,   Fluctuating finger sticks  Suspicion of Insulinoma and Glycogen storage diseases   Normal C-Peptide and lactate, Lipid profile  F/u MRI/MRA abdomen   F/u Serum Uric acid level   C/w Dextrose + NS+ KCL and D50 pushes as needed

## 2017-10-26 NOTE — PROGRESS NOTE ADULT - PROBLEM SELECTOR PROBLEM 3
Anorexia
Hypophosphatemia
Leukocytosis
Anorexia
Electrolyte imbalance
Hypophosphatemia
Leukocytosis

## 2017-10-26 NOTE — CONSULT NOTE ADULT - ASSESSMENT
62yo M with abdominal pain, liver failure, coagulopathy, celiac stenosis, SMA occlusion, and electrolyte abnormalities    - npo  - IV hydration  - MVIs and repletion of electrolytes and thiamine  - vitamin K for coagulopathy  - monitor fingersticks for glucose control  - repeat lactate tonight  - care as per vascular surgery  - please call with any acute change in exam or status    discussed with Dr. Deejay NICOLAS Team [id number] 60yo M with abdominal pain, liver failure, coagulopathy, celiac stenosis, SMA occlusion, and electrolyte abnormalities    - npo  - IV hydration  - MVIs and repletion of electrolytes and thiamine  - vitamin K for coagulopathy  - monitor fingersticks for glucose control  - repeat lactate tonight  - care as per vascular surgery  - please call with any acute change in exam or status    discussed with Dr. Tristian NICOLAS Team [id number]

## 2017-10-26 NOTE — DISCHARGE NOTE ADULT - CARE PLAN
Principal Discharge DX:	Mesenteric ischemia  Secondary Diagnosis:	Pneumothorax on right  Secondary Diagnosis:	Hyponatremia

## 2017-10-26 NOTE — H&P ADULT - NSHPREVIEWOFSYSTEMS_GEN_ALL_CORE
Constitutional: Denies fevers, admits to weight loss of 70lbs over several years, had chills a week ago in the hospital  Cardiac: Denies chest pain  Pulm: Noticed dyspnea approximately a month ago, not on home oxygen  GI: has been having watery brown diarrhea, vomited this morning, denies nausea, denies BBM  MSK: No muscle or joint pain

## 2017-10-26 NOTE — PROGRESS NOTE ADULT - PROVIDER SPECIALTY LIST ADULT
Endocrinology
Endocrinology
Gastroenterology
Gastroenterology
Heme/Onc
Internal Medicine
Nephrology
Thoracic Surgery
Nephrology
Internal Medicine

## 2017-10-26 NOTE — CONSULT NOTE ADULT - ASSESSMENT
A/P: 61M with Anorexia and Hypoglycemia    - patient does no meet criteria for SICU admission  - will follow on Continuum of Care with vascular team  - encourage PO intake   - D10 if FS remain low  - q1h fingersticks x 4h, then q4h thereafter.   - check electrolytes and replete accordingly. Patient on banana bag currently.   - continue hydration.

## 2017-10-26 NOTE — PROGRESS NOTE ADULT - PROBLEM SELECTOR PROBLEM 2
Hypoglycemia
Hypokalemia
Anorexia
Chronic diarrhea
Hypokalemia
Anorexia

## 2017-10-26 NOTE — H&P ADULT - NSHPPHYSICALEXAM_GEN_ALL_CORE
General: Lying in bed, appears emaciated, NC in place  CV: RRR  Pulm: chest tube to CLWS, dyspnea with speaking  Abdomen: TTP on LLQ, + BS, guarding  Extremities: grossly intact

## 2017-10-26 NOTE — PROGRESS NOTE ADULT - PROBLEM SELECTOR PROBLEM 5
Failure to thrive in adult
Electrolyte imbalance
Failure to thrive in adult
Hypoglycemia
Electrolyte imbalance

## 2017-10-26 NOTE — PROGRESS NOTE ADULT - ASSESSMENT
62 y/o Male s/p rt pigtail for rt pntx 10/25, hx COPD     -c/w pigtail to suction   -f/u am cxray   -pain medication prn  -care as per medical team  -will follow

## 2017-10-26 NOTE — PROGRESS NOTE ADULT - SUBJECTIVE AND OBJECTIVE BOX
INTERVAL HPI/OVERNIGHT EVENTS:    Pt s/p rt pigtail for rt pntx 10/25. Pt seen and examined at bedside. Admits to incisional pain. Denies SOB/ CP. Admits to abd pain, no n/v.      Vital Signs Last 24 Hrs  T(C): 36.3 (26 Oct 2017 08:49), Max: 36.4 (26 Oct 2017 01:05)  T(F): 97.3 (26 Oct 2017 08:49), Max: 97.5 (26 Oct 2017 01:05)  HR: 99 (26 Oct 2017 08:49) (89 - 99)  BP: 138/83 (26 Oct 2017 08:49) (134/85 - 138/83)  BP(mean): --  RR: 17 (26 Oct 2017 08:49) (17 - 18)  SpO2: 100% (26 Oct 2017 08:49) (97% - 100%)  I&O's Detail    25 Oct 2017 07:01  -  26 Oct 2017 07:00  --------------------------------------------------------  IN:    dextrose 5% + sodium chloride 0.9% with potassium chloride 20 mEq/L: 60 mL  Total IN: 60 mL    OUT:  Total OUT: 0 mL    Total NET: 60 mL        aluminum hydroxide/magnesium hydroxide/simethicone Suspension 30 milliLiter(s) Oral every 4 hours PRN  fluconAZOLE   Tablet 100 milliGRAM(s) Oral daily  pantoprazole  Injectable 40 milliGRAM(s) IV Push daily  simethicone 80 milliGRAM(s) Chew daily      Physical Exam  General: AAOx3, No acute distress  Chest: rt pigtail in place, dressing c/d/i, -AL, min incisional TTP  Abdomen: soft, nondistended, min TTP lower abd, no rebound tenderness, no guarding, no palpable masses          Labs:                        11.8   12.5  )-----------( 114      ( 25 Oct 2017 07:27 )             34.1     10-25    129<L>  |  94<L>  |  13  ----------------------------<  78  3.6   |  27  |  0.23<L>    Ca    8.0<L>      25 Oct 2017 07:27  Phos  2.9     10-25  Mg     1.7     10-25 INTERVAL HPI/OVERNIGHT EVENTS:    Pt s/p rt pigtail for rt pntx 10/25. Pt seen and examined at bedside. Admits to incisional pain. Denies SOB/ CP. Admits to abd pain, no n/v. +BM /flatus.       Vital Signs Last 24 Hrs  T(C): 36.3 (26 Oct 2017 08:49), Max: 36.4 (26 Oct 2017 01:05)  T(F): 97.3 (26 Oct 2017 08:49), Max: 97.5 (26 Oct 2017 01:05)  HR: 99 (26 Oct 2017 08:49) (89 - 99)  BP: 138/83 (26 Oct 2017 08:49) (134/85 - 138/83)  BP(mean): --  RR: 17 (26 Oct 2017 08:49) (17 - 18)  SpO2: 100% (26 Oct 2017 08:49) (97% - 100%)  I&O's Detail    25 Oct 2017 07:01  -  26 Oct 2017 07:00  --------------------------------------------------------  IN:    dextrose 5% + sodium chloride 0.9% with potassium chloride 20 mEq/L: 60 mL  Total IN: 60 mL    OUT:  Total OUT: 0 mL    Total NET: 60 mL        aluminum hydroxide/magnesium hydroxide/simethicone Suspension 30 milliLiter(s) Oral every 4 hours PRN  fluconAZOLE   Tablet 100 milliGRAM(s) Oral daily  pantoprazole  Injectable 40 milliGRAM(s) IV Push daily  simethicone 80 milliGRAM(s) Chew daily      Physical Exam  General: AAOx3, No acute distress  Chest: rt pigtail in place, dressing c/d/i, -AL, min incisional TTP  Abdomen: soft, nondistended, min TTP lower abd, no rebound tenderness, no guarding, no palpable masses          Labs:                        11.8   12.5  )-----------( 114      ( 25 Oct 2017 07:27 )             34.1     10-25    129<L>  |  94<L>  |  13  ----------------------------<  78  3.6   |  27  |  0.23<L>    Ca    8.0<L>      25 Oct 2017 07:27  Phos  2.9     10-25  Mg     1.7     10-25

## 2017-10-26 NOTE — CHART NOTE - NSCHARTNOTEFT_GEN_A_CORE
RRT called on arrival for patient Cliff Cabrera for altered mental status and hypoglycemia. Per admitting nurse, she went into the room to evaluate patient and he was non-verbal. Pt found to have a finger stick of 12 - given 2 amps of D50 with return of mental status and repeat finger stick of 110. Patient was hypoxic to 89% so chest tube was placed to suction and follow O2 saturation was 97%.     Vital Signs Last 24 Hrs  T(C): 36 (26 Oct 2017 17:33), Max: 36.8 (26 Oct 2017 14:52)  T(F): 96.8 (26 Oct 2017 17:33), Max: 98.2 (26 Oct 2017 14:52)  HR: 101 (26 Oct 2017 17:33) (89 - 101)  BP: 112/76 (26 Oct 2017 17:33) (112/76 - 139/95)  BP(mean): --  RR: 20 (26 Oct 2017 17:33) (17 - 20)  SpO2: 100% (26 Oct 2017 17:33) (97% - 100%)    General Appearance: awake, alert, cachectic  Neck: supple  Chest: equal chest rise  CV: RRR  Abdomen: soft, ND, RLQ and LLQ tenderness, no rebound, no guarding   Extremities: MAGAÑA    LABS ([facility])                        11.7   10.1  )-----------( 119      ( 26 Oct 2017 10:33 )             35.3     10-26    131<L>  |  96  |  32<H>  ----------------------------<  54<L>  4.6   |  30  |  0.33<L>    Ca    8.2<L>      26 Oct 2017 10:33  Phos  3.3     10-26  Mg     2.0     10-26      PT/INR - ( 26 Oct 2017 11:22 )   PT: 16.7 sec;   INR: 1.52 ratio         PTT - ( 26 Oct 2017 10:33 )  PTT:32.6 sec      62yo M with R chest tube for pneumothorax, celiac occlusion, cachectic, with hypoglycemia on arrival from [facility]    - patient give D50 x2 with return of mentation and FS of 110  - will start maintenance IVF with glucose  - monitor FS  - full set of labs - cbc, cmp, lactate, coags, t&S  - stat chest xray, continue CT to suction now for intermitted hypoxia

## 2017-10-26 NOTE — H&P ADULT - NSHPSOCIALHISTORY_GEN_ALL_CORE
Cigarettes: 1 PPD for 10 years, stopped when having multiple hospitalizations  EtOH: Non drinker, quit 7 years ago

## 2017-10-26 NOTE — CONSULT NOTE ADULT - ASSESSMENT
62yo M with SMA occlusion, celiac stenosis and R ptx s/p R pigtail placement    - follow up CXR on arrival at [facility]  - please check am CXR  - chest tube to suction    discussed with Dr. Sapp  [id number]

## 2017-10-26 NOTE — CONSULT NOTE ADULT - ATTENDING COMMENTS
Discussed with the PA and agree with her recommendations.
I have reviewed the history, pertinent labs and imaging, and discussed the care with the consult resident.  Complicated patient-will need to coordinate operative intervention, if any, with vascular surgery service.  At present without peritonitis.  Agree with hydration, dextrose administration and hepatology consultation.

## 2017-10-26 NOTE — ACUTE INTERFACILITY TRANSFER NOTE - REASON FOR ADMISSION
abdominal pain, nausea, vomiting and diarrhea Abdominal pain, nausea, vomiting and diarrhea Abdominal pain with eating, nausea, vomiting and diarrhea with Cachexia

## 2017-10-26 NOTE — H&P ADULT - HISTORY OF PRESENT ILLNESS
61M transferred from [facility] for vascular surgery. Patient was initially admitted for diarrhea and abdominal pain, a week ago at [facility]. On admission, patient was hyponatremic, hypokalemic and hypophospatemic, with varable blood glucoses ranging from 39 - 300. EGD was signififcant for gastritis, but no mass or malignancy. Colonoscopy done earlier this year (around March according to patient's spouse) was insignificant.. He was empirically started on fluconazole for possible esophageal candidiasis but fungal stains were negative on biopsy and flucoanozole was discontinued. HIV negative. 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 Patient was transferred to [facility] today for surgery. Of note, patient had a right sided pneumothorax on 10/24, chest tube was placed on 10/25, and there is a plan to remove chest tube on 10/27. Patient has been getting 1g NaCl BID for hyponatremia.

## 2017-10-26 NOTE — PROGRESS NOTE ADULT - PROBLEM SELECTOR PLAN 7
Improve score 1 but possible malignancy. Lovenox subcutaneous daily.  Protonix for GI ppx.
Improve score 1 but possible malignancy. Lovenox subcutaneous daily.  Protonix for GI ppx.

## 2017-10-26 NOTE — PROGRESS NOTE ADULT - PROBLEM SELECTOR PLAN 3
Likely from chronic diarrhea, concern for malignancy.   Concern for TB as patient from [facility]  Indeterminate Gold QuantiFeron   CT chest: Mild COPD and small blebs  f/u PPD result, PPD was performed at 6pm Tuesday.

## 2017-10-26 NOTE — PROGRESS NOTE ADULT - PROBLEM SELECTOR PROBLEM 4
Cachexia
Severe protein-calorie malnutrition
Hypoglycemia
Hypoalbuminemia due to protein-calorie malnutrition
Hypoglycemia
Hypoglycemia
Leukocytosis
Need for prophylactic measure
Severe protein-calorie malnutrition
Hypoglycemia

## 2017-10-26 NOTE — PROGRESS NOTE ADULT - PROBLEM SELECTOR PROBLEM 1
Hyponatremia
Chronic diarrhea
Pneumothorax on right
Chronic diarrhea
Failure to thrive in adult
Hyponatremia
Pneumothorax on right
Chronic diarrhea
Pneumothorax on right

## 2017-10-26 NOTE — PROGRESS NOTE ADULT - ATTENDING COMMENTS
Patient seen and examined yesterday; Agree with PGY1 A/P above with editing as needed.    See Acute Interfacility transfer nore.    Patient c/o more sever pain today. unablwe to tolwerate oral.  Received call from Radiologist; MRA shows Celiac axis obstruction.    Immediately I contacted Dr. sorin Galdamez, Vascular surgeon. 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 Immediately contacted Transfer Center and Patient was accepted by Dr. Galdamez to be transferred to [facility]. Within an hour patient was transferred. Also d/w Dr. Galdamez about anticoagulation. Advised by Dr. Galdamez, given chronicity, will not need therapeutic anticoagulation but recommended ASA 81 mg daily. Also placed on Lovenox prophylaxis which was d/c last evening by Oncologist/ Hemato for suspected HIT which is unlikely.     Also informed Thoracic Surgery PA who will get in touch with Dr. Becerra who I had spoken this morning and plan was to remove chest tube today. Dr. Becerra team will follow up at [facility] and remove Chest tube. Spontaneous PTX has almost resolved.     I reviewed all the above with his cousin who is a Cardiologist and agreed with the plan  Also left message for wife with need for transfer.     Discussed with PGY1/2/ RN/ Vascular/ CT Sx/ Patient/ Cousin brother

## 2017-10-26 NOTE — CONSULT NOTE ADULT - SUBJECTIVE AND OBJECTIVE BOX
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 Pt was seen at [facility] by Dr. Sapp for a R pneumothorax s/p R pigtail placement 10/25. Pigtail was to suction with plans to remove tomorrow. However, on arrival to [facility] patient was found to be hypoxemic and hypoglycemic to 12. His chest tube was placed to suction during RRT.     PAST MEDICAL & SURGICAL HISTORY:  Chronic diarrhea  Anorexia  Cachexia  Acid reflux  No significant past surgical history    [  ] No significant past history as reviewed with the patient and family    FAMILY HISTORY:  No pertinent family history in first degree relatives    [  ] Family history not pertinent as reviewed with the patient and family    SOCIAL HISTORY:    MEDICATIONS  (STANDING):  dextrose 10% + sodium chloride 0.9% 1000 milliLiter(s) (50 mL/Hr) IV Continuous <Continuous>  dextrose 5%. 1000 milliLiter(s) (50 mL/Hr) IV Continuous <Continuous>  dextrose 50% Injectable 12.5 Gram(s) IV Push once  dextrose 50% Injectable 25 Gram(s) IV Push once  dextrose 50% Injectable 25 Gram(s) IV Push once  enoxaparin Injectable 40 milliGRAM(s) SubCutaneous every 24 hours  influenza   Vaccine 0.5 milliLiter(s) IntraMuscular once  multivitamin/thiamine/folic acid in sodium chloride 0.9% 1000 milliLiter(s) (100 mL/Hr) IV Continuous <Continuous>  phytonadione  IVPB 5 milliGRAM(s) IV Intermittent once  thiamine IVPB 500 milliGRAM(s) IV Intermittent daily    MEDICATIONS  (PRN):  dextrose Gel 1 Dose(s) Oral once PRN Blood Glucose LESS THAN 70 milliGRAM(s)/deciLiter  glucagon  Injectable 1 milliGRAM(s) IntraMuscular once PRN Glucose <70 milliGRAM(s)/deciLiter    Allergies    No Known Allergies    Intolerances        Vital Signs Last 24 Hrs  T(C): 36 (26 Oct 2017 17:33), Max: 36.8 (26 Oct 2017 14:52)  T(F): 96.8 (26 Oct 2017 17:33), Max: 98.2 (26 Oct 2017 14:52)  HR: 100 (26 Oct 2017 20:47) (89 - 104)  BP: 104/69 (26 Oct 2017 20:47) (104/69 - 139/95)  BP(mean): --  RR: 20 (26 Oct 2017 20:47) (17 - 20)  SpO2: 100% (26 Oct 2017 20:47) (97% - 100%)  Daily Height in cm: 182.88 (26 Oct 2017 17:33)    Daily     Exam:  General: awake, alert, NAD, cachectic  HEENT: NCAT, MMM  Resp: nonlabored  Chest: R pigtail in place to suction  Abd: soft  Ext: MAGAÑA  Neuro: intact                          7.7    10.95 )-----------( 76       ( 26 Oct 2017 19:10 )             22.2     10-26    129<L>  |  94<L>  |  42<H>  ----------------------------<  166<H>  4.9   |  18<L>  |  0.42<L>    Ca    7.1<L>      26 Oct 2017 19:10  Phos  4.6     10-26  Mg     2.0     10-26    TPro  3.8<L>  /  Alb  2.0<L>  /  TBili  0.9  /  DBili  0.3<H>  /  AST  1120<H>  /  ALT  687<H>  /  AlkPhos  57  10-26    PT/INR - ( 26 Oct 2017 19:00 )   PT: 27.7 SEC;   INR: 2.43          PTT - ( 26 Oct 2017 19:00 )  PTT:42.7 SEC  Urinalysis Basic - ( 26 Oct 2017 19:00 )    Color: YELLOW / Appearance: CLEAR / S.033 / pH: 6.0  Gluc: 250 / Ketone: NEGATIVE  / Bili: NEGATIVE / Urobili: NORMAL E.U.   Blood: NEGATIVE / Protein: 100 / Nitrite: NEGATIVE   Leuk Esterase: NEGATIVE / RBC: 0-2 / WBC 2-5   Sq Epi: x / Non Sq Epi: x / Bacteria: x        IMAGING STUDIES:  < from: Xray Chest 1 View AP- PORTABLE-Urgent (10.26.17 @ 15:03) >  EXAM:  CHEST-PORTABLE URGENT                            PROCEDURE DATE:  10/26/2017          INTERPRETATION:  Single view chest    History pneumothorax    Comparison 0931 hours    The right-sided chest tube has reportedly been placed on waterseal. The   trace right apical pneumothorax is unchanged. There is no consolidation   or layering effusion. The left hemithorax is normal.                ARIEL LOUIS M.D., ATTENDING RADIOLOGIST  This document has been electronically signed. Oct 26 96742:[id number]        < end of copied text >

## 2017-10-26 NOTE — DISCHARGE NOTE ADULT - PATIENT PORTAL LINK FT
“You can access the [employer] Patient Portal, offered by [facility], by registering with the following website: [email or web address]”

## 2017-10-27 NOTE — PROGRESS NOTE ADULT - SUBJECTIVE AND OBJECTIVE BOX
Seen ex'ed w team.  Pt awake.  L abd L pain and tenderness.  No N/V/D  wbc ok.  Bands up.  Lact 4.9  pH 7.32  Abd: firm, + guarding, tender, +rebound.    A/P:   Mesenteric ischemia.  Acute on chronic.  Worse overnight.  Now w likely ischemic bowel.    Plan:   E. Lap +/- bowel resection +/- revasc  To OR emergently w Gen surg.  DW'ed pt, spouse and cousin (cardiologist) at [facility] re extrremely high risks.  Informedc consent obtained from pt (A&Ox3) CAlled re pt ~ 7:30 getting worse abd symp's.  Seen ex'ed w team ~ 8am.  Pt awake.  L abd L pain and tenderness.  No N/V/D  wbc ok.  Bands up.  Lact 4.9  pH 7.32  Abd: firm, + guarding, tender, +rebound.    A/P:   Mesenteric ischemia.  Acute on chronic.  Worse overnight.  Now w likely ischemic bowel.    Plan:   E. Lap +/- bowel resection +/- revasc  To OR emergently w Gen surg.  DW'ed pt, spouse and cousin (cardiologist) at [facility] re extrremely high risks.  Informedc consent obtained from pt (A&Ox3)

## 2017-10-27 NOTE — BRIEF OPERATIVE NOTE - OPERATION/FINDINGS
RIJ cordis placed; left femoral A-line placed. Pt received 4PRBC; 4 FFP, 1PLT    Exploratory laparotomy. Stomach & approx. 20 - 30cm of small bowel viable. Remaining small bowel & colon to the rectum noted to be ischemic/necrotic with multiple large holes in the appendix spilling gross pus. Bilious/murky ascites with exact location of bilious content unknown also noted. Decision made that patient's condition is not recoverable. At this time abdominal fascia was closed with looped PDS & staples used to close the skin

## 2017-10-27 NOTE — PROGRESS NOTE ADULT - SUBJECTIVE AND OBJECTIVE BOX
TEAM A SURGERY PROGRESS NOTE    POST OPERATIVE DAY #:       SUBJECTIVE: Pt seen    Pain /SOB/Nausea/Vomiting/Flatus/BM/Void/Ambulation    Vital Signs Last 24 Hrs  T(C): 36.8 (27 Oct 2017 19:30), Max: 36.8 (27 Oct 2017 19:30)  T(F): 98.3 (27 Oct 2017 19:30), Max: 98.3 (27 Oct 2017 19:30)  HR: 117 (27 Oct 2017 20:30) (64 - 122)  BP: 93/69 (27 Oct 2017 17:45) (84/59 - 110/57)  BP(mean): 75 (27 Oct 2017 17:45) (75 - 86)  RR: 15 (27 Oct 2017 20:30) (15 - 28)  SpO2: 95% (27 Oct 2017 20:00) (95% - 100%)    Physical Exam  General: awake, alert,    Pulm: respirations unlabored, no increased WOB  Abdomen: soft, mildly distended, NT, incision c/d/i  Extremities: Grossly symmetric    I&O's Summary    26 Oct 2017 07:  -  27 Oct 2017 07:00  --------------------------------------------------------  IN: 950 mL / OUT: 650 mL / NET: 300 mL    27 Oct 2017 07:  -  27 Oct 2017 23:43  --------------------------------------------------------  IN: 1522.7 mL / OUT: 610 mL / NET: 912.7 mL      I&O's Detail    26 Oct 2017 07:  -  27 Oct 2017 07:00  --------------------------------------------------------  IN:    dextrose 10% + sodium chloride 0.9%: 300 mL    IV PiggyBack: 50 mL    multivitamin/thiamine/folic acid in sodium chloride 0.9%: 600 mL  Total IN: 950 mL    OUT:    Voided: 650 mL  Total OUT: 650 mL    Total NET: 300 mL      27 Oct 2017 07:  -  27 Oct 2017 23:43  --------------------------------------------------------  IN:    dextrose 10% + sodium chloride 0.9%: 200 mL    dextrose 10%.: 1200 mL    fentaNYL  Infusion: 2.3 mL    fentaNYL  Infusion: 46.5 mL    norepinephrine Infusion: 17.7 mL    norepinephrine Infusion: 43 mL    vasopressin Infusion: 12 mL    vasopressin Infusion: 1.2 mL  Total IN: 1522.7 mL    OUT:    Chest Tube: 10 mL    Indwelling Catheter - Urethral: 500 mL    Nasoenteral Tube: 100 mL  Total OUT: 610 mL    Total NET: 912.7 mL          MEDICATIONS  (STANDING):  dextrose 10%. 1000 milliLiter(s) (200 mL/Hr) IV Continuous <Continuous>  fentaNYL   Infusion 2 MICROgram(s)/kG/Hr (9.26 mL/Hr) IV Continuous <Continuous>  norepinephrine Infusion 0.1 MICROgram(s)/kG/Min (8.681 mL/Hr) IV Continuous <Continuous>  vasopressin Infusion 0.04 Unit(s)/Min (2.4 mL/Hr) IV Continuous <Continuous>    MEDICATIONS  (PRN):  LORazepam   Injectable 2 milliGRAM(s) IV Push every 4 hours PRN Agitation      LABS:                        7.0    7.17  )-----------( 110      ( 27 Oct 2017 11:30 )             21.1     10-27    134<L>  |  98  |  44<H>  ----------------------------<  108<H>  5.0   |  15<L>  |  0.55    Ca    7.2<L>      27 Oct 2017 11:30  Phos  4.6     10-26  Mg     2.0     10-26    TPro  3.5<L>  /  Alb  1.9<L>  /  TBili  0.9  /  DBili  x   /  AST  3668<H>  /  ALT  1598<H>  /  AlkPhos  66  10-27    PT/INR - ( 27 Oct 2017 11:30 )   PT: 19.8 SEC;   INR: 1.75          PTT - ( 27 Oct 2017 11:30 )  PTT:50.2 SEC  Urinalysis Basic - ( 26 Oct 2017 19:00 )    Color: YELLOW / Appearance: CLEAR / S.033 / pH: 6.0  Gluc: 250 / Ketone: NEGATIVE  / Bili: NEGATIVE / Urobili: NORMAL E.U.   Blood: NEGATIVE / Protein: 100 / Nitrite: NEGATIVE   Leuk Esterase: NEGATIVE / RBC: 0-2 / WBC 2-5   Sq Epi: x / Non Sq Epi: x / Bacteria: x        RADIOLOGY & ADDITIONAL STUDIES: TEAM B SURGERY PROGRESS NOTE    s/p exploratory laparotomy      SUBJECTIVE: Pt seen and examined in PACU. Patient sedated and intubated      Vital Signs Last 24 Hrs  T(C): 36.8 (27 Oct 2017 19:30), Max: 36.8 (27 Oct 2017 19:30)  T(F): 98.3 (27 Oct 2017 19:30), Max: 98.3 (27 Oct 2017 19:30)  HR: 117 (27 Oct 2017 20:30) (64 - 122)  BP: 93/69 (27 Oct 2017 17:45) (84/59 - 110/57)  BP(mean): 75 (27 Oct 2017 17:45) (75 - 86)  RR: 15 (27 Oct 2017 20:30) (15 - 28)  SpO2: 95% (27 Oct 2017 20:00) (95% - 100%)    Physical Exam  General: sedated   Pulm: intubated   Abdomen: soft, mildly distended, incision with some strikethrough   Extremities: Grossly symmetric    I&O's Summary    26 Oct 2017 07:  -  27 Oct 2017 07:00  --------------------------------------------------------  IN: 950 mL / OUT: 650 mL / NET: 300 mL    27 Oct 2017 07:  -  27 Oct 2017 23:43  --------------------------------------------------------  IN: 1522.7 mL / OUT: 610 mL / NET: 912.7 mL      I&O's Detail    26 Oct 2017 07:  -  27 Oct 2017 07:00  --------------------------------------------------------  IN:    dextrose 10% + sodium chloride 0.9%: 300 mL    IV PiggyBack: 50 mL    multivitamin/thiamine/folic acid in sodium chloride 0.9%: 600 mL  Total IN: 950 mL    OUT:    Voided: 650 mL  Total OUT: 650 mL    Total NET: 300 mL      27 Oct 2017 07:01  -  27 Oct 2017 23:43  --------------------------------------------------------  IN:    dextrose 10% + sodium chloride 0.9%: 200 mL    dextrose 10%.: 1200 mL    fentaNYL  Infusion: 2.3 mL    fentaNYL  Infusion: 46.5 mL    norepinephrine Infusion: 17.7 mL    norepinephrine Infusion: 43 mL    vasopressin Infusion: 12 mL    vasopressin Infusion: 1.2 mL  Total IN: 1522.7 mL    OUT:    Chest Tube: 10 mL    Indwelling Catheter - Urethral: 500 mL    Nasoenteral Tube: 100 mL  Total OUT: 610 mL    Total NET: 912.7 mL          MEDICATIONS  (STANDING):  dextrose 10%. 1000 milliLiter(s) (200 mL/Hr) IV Continuous <Continuous>  fentaNYL   Infusion 2 MICROgram(s)/kG/Hr (9.26 mL/Hr) IV Continuous <Continuous>  norepinephrine Infusion 0.1 MICROgram(s)/kG/Min (8.681 mL/Hr) IV Continuous <Continuous>  vasopressin Infusion 0.04 Unit(s)/Min (2.4 mL/Hr) IV Continuous <Continuous>    MEDICATIONS  (PRN):  LORazepam   Injectable 2 milliGRAM(s) IV Push every 4 hours PRN Agitation      LABS:                        7.0    7.17  )-----------( 110      ( 27 Oct 2017 11:30 )             21.1     10-27    134<L>  |  98  |  44<H>  ----------------------------<  108<H>  5.0   |  15<L>  |  0.55    Ca    7.2<L>      27 Oct 2017 11:30  Phos  4.6     10-26  Mg     2.0     10-26    TPro  3.5<L>  /  Alb  1.9<L>  /  TBili  0.9  /  DBili  x   /  AST  3668<H>  /  ALT  1598<H>  /  AlkPhos  66  10-27    PT/INR - ( 27 Oct 2017 11:30 )   PT: 19.8 SEC;   INR: 1.75          PTT - ( 27 Oct 2017 11:30 )  PTT:50.2 SEC  Urinalysis Basic - ( 26 Oct 2017 19:00 )    Color: YELLOW / Appearance: CLEAR / S.033 / pH: 6.0  Gluc: 250 / Ketone: NEGATIVE  / Bili: NEGATIVE / Urobili: NORMAL E.U.   Blood: NEGATIVE / Protein: 100 / Nitrite: NEGATIVE   Leuk Esterase: NEGATIVE / RBC: 0-2 / WBC 2-5   Sq Epi: x / Non Sq Epi: x / Bacteria: x        RADIOLOGY & ADDITIONAL STUDIES:

## 2017-10-27 NOTE — PROGRESS NOTE ADULT - SUBJECTIVE AND OBJECTIVE BOX
VASCULAR SURGERY POST-OP CHECK:    S: Patient seen and examined in the SICU. He was on levo, vaso, and fentanyl drips. He was not responsive at bedside. His family has elected not to advance care.    O:  Vital Signs Last 24 Hrs  T(C): 36.4 (28 Oct 2017 00:00), Max: 36.8 (27 Oct 2017 19:30)  T(F): 97.5 (28 Oct 2017 00:00), Max: 98.3 (27 Oct 2017 19:30)  HR: 112 (28 Oct 2017 00:00) (64 - 122)  BP: 93/69 (27 Oct 2017 17:45) (84/59 - 110/57)  BP(mean): 75 (27 Oct 2017 17:45) (75 - 86)  RR: 15 (28 Oct 2017 00:00) (15 - 28)  SpO2: 97% (27 Oct 2017 23:54) (95% - 100%)    I&O's Detail    26 Oct 2017 07:01  -  27 Oct 2017 07:00  --------------------------------------------------------  IN:    dextrose 10% + sodium chloride 0.9%: 300 mL    IV PiggyBack: 50 mL    multivitamin/thiamine/folic acid in sodium chloride 0.9%: 600 mL  Total IN: 950 mL    OUT:    Voided: 650 mL  Total OUT: 650 mL    Total NET: 300 mL    27 Oct 2017 07:01  -  28 Oct 2017 00:38  --------------------------------------------------------  IN:    dextrose 10% + sodium chloride 0.9%: 200 mL    dextrose 10%.: 1400 mL    fentaNYL  Infusion: 2.3 mL    fentaNYL  Infusion: 55.8 mL    norepinephrine Infusion: 17.7 mL    norepinephrine Infusion: 51.6 mL    vasopressin Infusion: 14.4 mL    vasopressin Infusion: 1.2 mL  Total IN: 1743 mL    OUT:    Chest Tube: 10 mL    Indwelling Catheter - Urethral: 925 mL    Nasoenteral Tube: 100 mL  Total OUT: 1035 mL    Total NET: 708 mL    MEDICATIONS  (STANDING):  dextrose 10%. 1000 milliLiter(s) (200 mL/Hr) IV Continuous <Continuous>  fentaNYL   Infusion 2 MICROgram(s)/kG/Hr (9.26 mL/Hr) IV Continuous <Continuous>  norepinephrine Infusion 0.1 MICROgram(s)/kG/Min (8.681 mL/Hr) IV Continuous <Continuous>  vasopressin Infusion 0.04 Unit(s)/Min (2.4 mL/Hr) IV Continuous <Continuous>    MEDICATIONS  (PRN):  LORazepam   Injectable 2 milliGRAM(s) IV Push every 4 hours PRN Agitation                    7.0    7.17  )-----------( 110      ( 27 Oct 2017 11:30 )             21.1     10-27    134<L>  |  98  |  44<H>  ----------------------------<  108<H>  5.0   |  15<L>  |  0.55    Ca    7.2<L>      27 Oct 2017 11:30  Phos  4.6     10-26  Mg     2.0     10-26    TPro  3.5<L>  /  Alb  1.9<L>  /  TBili  0.9  /  DBili  x   /  AST  3668<H>  /  ALT  1598<H>  /  AlkPhos  66  10-27    Physical Exam:  Gen: Laying in bed, not responsive, mckeon in  HEENT: NGT in, torus mandibularis, poor dentition, L eyelid swollen compared to R  Resp: intubated, PEEP 7 FiO2 100%, R pigtail in place  Abd: softly distended, midline incision covered with gauze with blood strikethrough    Lines:   IVs: patent, in place  R IJ TLC  fem A line  mckeon  NGT  ETT

## 2017-10-27 NOTE — BRIEF OPERATIVE NOTE - PROCEDURE
<<-----Click on this checkbox to enter Procedure Exploratory laparotomy  10/27/2017    Active  AGAINES

## 2017-10-27 NOTE — PROVIDER CONTACT NOTE (OTHER) - SITUATION
MD Jackson # [id number] informed that patient f/s is 44. Patient has an episode of dark stool and rn requesting order for occult blood

## 2017-10-27 NOTE — PROVIDER CONTACT NOTE (OTHER) - ACTION/TREATMENT ORDERED:
1 amp D50 given. Patient continues with D10. Will continue to monitor. 1 amp D50 given. Patient continues with D10. Will continue to monitor. RN instructed to switch the rate of fluids. Banana bag at 50cc/hr and D10 at 100cc/hr.

## 2017-10-27 NOTE — CHART NOTE - NSCHARTNOTEFT_GEN_A_CORE
October 27th, 2017  8:30 AM    Initial General Surgical Consultation    I was asked to evaluate this patient and to provide a General Surgical Consultation. The patient’s chart was reviewed and he is examined.    This patient is a 61-year-old cachectic male who was transferred from the Medical Service at [facility] to the Vascular Surgery Service at [facility] at approximately 5:30 PM on 10/26/17 for ongoing care. Apparently, because he had complaints of having abdominal pain, diarrhea, and weight loss, in January or February, 2017, at [facility], the patient had upper and lower endoscopy as an outpatient reportedly finding some inflammation. He presented to the ED at [facility] at 1 PM on 9/8/17 with complaints of having abdominal pain. The pain had been present for one year prior to that evaluation and was accompanied with unintentional weight loss. On presentation to the ED he had a:    BP	=	114/76  P	=	83  R	=	15  Temp	=	37  O2 Sat	=	99%  VAS	=	2/10    He was awake, alert and in no acute distress. He had a soft, non-distended abdomen with epigastric and ian-umbilical tenderness. There was no guarding or rebound.    An IV cannula was inserted and fluid was given. Laboratory tests revealed a:    WBC	=	14	Na		=	137	Bilirubin	=	0.5  Neutro	=	81%	K		=	4.1	Alk Phos	=	72  Hgb	=	14	Cl		=	98	SGOT		=	27  Hct	=	43%	HCO3		=	27	SGPT		=	25  Plts	=	358	Glucose	=	85  			BUN		=	17	Albumin	=	4.0  			Creat		=	0.8	Lipase		=	66  pH	=	7.33  pCO2	=	60	Lactate		=	1.4  pO2	=	19  BE	=	4.1    An EKG revealed sinus bradycardia    A contrast enhanced CT scan of his abdomen and pelvis revealed mild wall thickening of loops of small bowel.    The patient was thought to have enteritis and was discharged to home. Apparently he then sought care at [facility] for his continued complaints of having abdominal pain. The patient’s family members report that at one time he was evaluated in an ED for complaints of pain alone with anorexia, nausea, emesis and diarrhea. He was thought to have drug seeking behavior. He was reportedly escorted out of the ED by hospital security. From 10/16 - 10/17 he was hospitalized at [facility] but the events and findings of that reported hospitalization are not now known.    He presented to the ED at [facility] at approximately 11:50 PM on 10/18/17 with complaints of having abdominal pain, anorexia, nausea, emesis intermittent diarrhea and weight loss. He denied having had fever or chills and had not been jaundiced. He denied having had chest pain or palpitations and had not had a cough of dyspnea. He denied having had urinary symptoms of frequency, hematuria, or dysuria. He had not had sick contacts or recent foreign travel.    On presentation to the ED he had a:    BP	=	133/88  P	=	72	Weight	=	46.7 Kg  R	=	16	Height	=	188 cm  Temp	=	34.5	BMI	=	13.2  O2 Sat	=	100%  VAS	=	6/10    He was awake, alert, and fully oriented. He was in mild acute distress. He was cachetic.  He was anicteric. His pupils were reactive.  He had regular heart tones and he did not have a murmur.  He had clear breath sounds bilaterally.  He had a soft, scaphoid abdomen that was neither distended nor tender. There was no guarding or rebound. He did not have palpable masses or abdominal wall hernias. He had active bowel sounds. A rectal exam revealed only multiple external hemorrhoids.    A contrast enhanced CT scan of his abdomen and pelvis failed to reveal evidence of a bowel obstruction or inflammation. He had extensive atheromatous plaque in the aorta.      He was diagnosed as having hypokalemia and hyponatremia and was given 2 liters on NS. He was admitted to the Medical Service. Potassium was administered. He had hypoglycemia and was given parenteral dextrose. A nephrology consultation was obtained and it was suggested that the patient had asymptomatic hypovolemic hyponatremia. A Gastroenterology consultation was also obtained and agreed with a previous suggestion that the patient should be evaluated for an insulinoma (as he had hypoglycemia). It was recommended that the patient’s stool be checked for ova and parasites as well as elastase and that the patient should undergo an upper endoscopy.     On 10/20 it was noted that the patient’s pain had improved and he had only one bowel movement. There was no nausea or emesis and he was able to tolerate oral liquids. The patient initially refused upper endoscopy but subsequently underwent upper endoscopy finding candidal esophagitis and mild gastritis. The mucosa of the duodenum appeared abnormal with villous blunting, pitting and cracking. Biopsies were obtained. He continued to have hypoglycemia and was given dextrose and glucagon. An endocrinology consultation suggested that the hypoglycemia was due to chronic malnutrition.    A CT scan of his chest was obtained on 10/19 that revealed multiple upper lobe bleb.    The patient had periods of forgetfulness and plans were formulated for him to have an MRI of his brain. He was stable and continued to gradually improve. Some consideration was given to place a PEG. On 10/23 it was noted that the patient had increased pain after eating and the gastroenterologist suggested that the previously planned MRI of the pancreas also be done with an MRA of the mesenteric vessels. In the morning of 10/24 the patient reported that had dyspnea for the last few days and supplemental oxygen was then administered. A chest radiograph in the evening of 10/24 revealed a spontaneous right pneumothorax (20% – 25%) that was initially treated “conservatively.” The patient also initially refused chest tube insertion. In the afternoon of 10/15 a chest tube was inserted (pigtail catheter). The MRI of his brain and pancreas failed to reveal any abnormalities. There was a concern that the patient had a neoplasm, he had leukocytosis, anemia and thrombocytopenia and a hematology / oncology consultation was also obtained. The anemia was thought to be due to chronic disease, and the acute decrease in the platelet count was thought to be due to DIC, HIT or progressive bone marrow disease. It was suggested that the lovonex be discontinued. The patient’s elevated PT and INR were thought to be due to vitamin K deficiency and vitamin K was given.    While he was at [facility] laboratory tests revealed a:    			10/19	10/19	10/19	10//20	  			1	9:30	3	6	  			AM	AM	PM	AM	  WBC		=	12	15	-	13	  Neutro		=	87%	88%	-	88%	  Bands		=	-	-	-	-	  Hgb		=	12	12	-	13	  Hct		=	37%	34%	-	39%	  Plts		=	245	196	-	209	    PT		=	-	-	15	15.6	  PTT		=	-	-	-	38.8	  INR		=	-	-	1.37	1.42	    Na		=	121	130	131	132	  K		=	3.3	3.2	3.7	4.2	  Cl		=	81	94	97	97	  HCO3		=	34	30	30	32	  Glucose	=	62	23	54	93	  BUN		=	19	20	20	15	  Creat		=	0.3	< 0.2	0.3	0.3	    Bilirubin	=	0.8	0.8	-	0.7	  Alk Phos	=	85	83	-	89	  SGOT		=	78	79	-	75	  SGPT		=	70	67	-	72	    LDH		=	-	-	-	332	    Lipase		=	377	-	-	-	    On 10/26 it was noted that the patient had lower abdominal tenderness without guarding or rebound. The patient was treated with morphine without significant relief. 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 The HARI was not seen and was thought to be occluded. This finding prompted the patient’s physicians to transfer this patient to [facility] as the patient was thought to have mesenteric ischemia (in order to revascularize the mesenteric vessels).      He has a medical history for the above noted chronic abdominal pain that was accompanied with a 70 - 80 pound weight loss over the last year. He had had symptoms of having gastro-esophageal reflux. He has had an inguinal herniorrhaphy. Prior to the current hospitalization he took:    1)	Oxycodone	2)	Imodium	3)	Protonix	4)	Colace  5)	Flagyl    He has no known medication allergies and he is not allergic to latex. He previously abused ethanol stopping 7 years ago. He smokes ½ of a pack of cigarettes per day for the last 42 years. His family history is significant for an uncle who had gastric carcinoma. He is  and resides with his spouse, Leyla Cabrera ([phone]) in a private home in . They live in the attic of a home with three flights of stairs to the attic. Another person resides at the residence (rents a room). He reportedly has another spouse in the [facility]. The patient’s cousin, who lives in [place], [place] is Dr. Bala Cabrera ([phone]). The patient has decreased hearing and impaired vision. He has had weakness and fatigue. He has not had fever or chills. He denied having had headaches or photophobia and has not had chest pain or palpitations. He has not had a cough or dyspnea. He has the above noted diffuse abdominal pain that has been accompanied with anorexia, nausea, and intermittently with emesis and multiple loose bowel movements per day (diarrhea). He has not been jaundiced nor has he had dark urine or acholic stools. He has not had urinary symptoms. His 10-point review of systems was otherwise negative. His internists are Dr. Samanta Strange and Dr. Davies.           Blood 10/19	-	Negative  Blood 10/19	-	Negative  Blood 10/19	-	HIV - Negative October 27th, 2017  8:30 AM    Initial General Surgical Consultation    I was asked to evaluate this patient and to provide a General Surgical Consultation. The patient’s chart was reviewed and he is examined.    This patient is a 61-year-old cachectic male who was transferred from the Medical Service at [facility] to the Vascular Surgery Service at [facility] at approximately 5:30 PM on 10/26/17 for ongoing care. Apparently, because he had complaints of having abdominal pain, diarrhea, and weight loss, in January or February, 2017, at [facility], the patient had upper and lower endoscopy as an outpatient reportedly finding some inflammation. He presented to the ED at [facility] at 1 PM on 9/8/17 with complaints of having abdominal pain. The pain had been present for one year prior to that evaluation and was accompanied with unintentional weight loss. On presentation to the ED he had a:    BP	=	114/76  P	=	83  R	=	15  Temp	=	37  O2 Sat	=	99%  VAS	=	2/10    He was awake, alert and in no acute distress. He had a soft, non-distended abdomen with epigastric and ian-umbilical tenderness. There was no guarding or rebound.    An IV cannula was inserted and fluid was given. Laboratory tests revealed a:    WBC	=	14	Na		=	137	Bilirubin		=	0.5  Neutro	=	81%	K		=	4.1	Alk Phos	=	72  Hgb	=	14	Cl		=	98	SGOT		=	27  Hct	=	43%	HCO3		=	27	SGPT		=	25  Plts	=	358	Glucose	=	85  			BUN		=	17	Albumin	=	4.0  			Creat		=	0.8	Lipase		=	66  pH	=	7.33  pCO2	=	60	Lactate		=	1.4  pO2	=	19  BE	=	4.1    An EKG revealed sinus bradycardia    A contrast enhanced CT scan of his abdomen and pelvis revealed mild wall thickening of loops of small bowel.    The patient was thought to have enteritis and was discharged to home. Apparently he then sought care at [facility] for his continued complaints of having abdominal pain. The patient’s family members report that at one time he was evaluated in an ED for complaints of pain alone with anorexia, nausea, emesis and diarrhea. He was thought to have drug seeking behavior. He was reportedly escorted out of the ED by hospital security. From 10/16 - 10/17 he was hospitalized at [facility] but the events and findings of that reported hospitalization are not now known.    He presented to the ED at [facility] at approximately 11:50 PM on 10/18/17 with complaints of having abdominal pain, anorexia, nausea, emesis intermittent diarrhea and weight loss. He denied having had fever or chills and had not been jaundiced. He denied having had chest pain or palpitations and had not had a cough of dyspnea. He denied having had urinary symptoms of frequency, hematuria, or dysuria. He had not had sick contacts or recent foreign travel.    On presentation to the ED he had a:    BP	=	133/88  P	=	72	Weight	=	46.7 Kg  R	=	16	Height	=	188 cm  Temp	=	34.5	BMI	=	13.2  O2 Sat	=	100%  VAS	=	6/10    He was awake, alert, and fully oriented. He was in mild acute distress. He was cachetic.  He was anicteric. His pupils were reactive.  He had regular heart tones and he did not have a murmur.  He had clear breath sounds bilaterally.  He had a soft, scaphoid abdomen that was neither distended nor tender. There was no guarding or rebound. He did not have palpable masses or abdominal wall hernias. He had active bowel sounds. A rectal exam revealed only multiple external hemorrhoids.    A contrast enhanced CT scan of his abdomen and pelvis failed to reveal evidence of a bowel obstruction or inflammation. He had extensive atheromatous plaque in the aorta.      He was diagnosed as having hypokalemia and hyponatremia and was given 2 liters on NS. He was admitted to the Medical Service. Potassium was administered. He had hypoglycemia and was given parenteral dextrose. A nephrology consultation was obtained and it was suggested that the patient had asymptomatic hypovolemic hyponatremia. A Gastroenterology consultation was also obtained and agreed with a previous suggestion that the patient should be evaluated for an insulinoma (as he had hypoglycemia). It was recommended that the patient’s stool be checked for ova and parasites as well as elastase and that the patient should undergo an upper endoscopy.     On 10/20 it was noted that the patient’s pain had improved and he had only one bowel movement. There was no nausea or emesis and he was able to tolerate oral liquids. The patient initially refused upper endoscopy but subsequently underwent upper endoscopy finding candidal esophagitis and mild gastritis. The mucosa of the duodenum appeared abnormal with villous blunting, pitting and cracking. Biopsies were obtained. He continued to have hypoglycemia and was given dextrose and glucagon. An endocrinology consultation suggested that the hypoglycemia was due to chronic malnutrition.    A CT scan of his chest was obtained on 10/19 that revealed multiple upper lobe bleb.    The patient had periods of forgetfulness and plans were formulated for him to have an MRI of his brain. He was stable and continued to gradually improve. Some consideration was given to place a PEG. On 10/23 it was noted that the patient had increased pain after eating and the gastroenterologist suggested that the previously planned MRI of the pancreas also be done with an MRA of the mesenteric vessels. In the morning of 10/24 the patient reported that had dyspnea for the last few days and supplemental oxygen was then administered. A chest radiograph in the evening of 10/24 revealed a spontaneous right pneumothorax (20% – 25%) that was initially treated “conservatively.” The patient also initially refused chest tube insertion. In the afternoon of 10/15 a chest tube was inserted (pigtail catheter). The non-contrast MRI of his brain and the non-contrast MRI of the pancreas failed to reveal any abnormalities. There was a concern that the patient had a neoplasm, he had leukocytosis, anemia and thrombocytopenia and a hematology / oncology consultation was also obtained. The anemia was thought to be due to chronic disease, and the acute decrease in the platelet count was thought to be due to DIC, HIT or progressive bone marrow disease. It was suggested that the lovonex be discontinued. The patient’s elevated PT and INR were thought to be due to vitamin K deficiency and vitamin K was given. He had abdominal pain on 10/25 and an abdominal radiograph revealed dilated loops of small bowel with several air-fluid levels. While he was at [facility] laboratory tests revealed a:    			10/19	10/19	10/19	10//20	10/20	10/20	10/21	10/21  			1	9:30	3	6	2:50	7:35	12:30	9  			AM	AM	PM	AM	PM	PM	AM	AM  WBC		=	12	15	-	13	-	-	-	10  Neutro		=	87%	88%	-	88%	-	-	-	83%  Bands		=	-	-	-	-	-	-	-	  Hgb		=	12	12	-	13	-	-	-	12  Hct		=	37%	34%	-	39%	-	-	-	38%  Plts		=	245	196	-	209	-	-	-	186    PT		=	-	-	15	15.6	-	-	-	-  PTT		=	-	-	-	38.8	-	-	-	-  INR		=	-	-	1.37	1.42	-	-	-	-    Na		=	121	130	131	132	132	133	136	132  K		=	3.3	3.2	3.7	4.2	3.4	3.0	3.5	3.5  Cl		=	81	94	97	97	97	95	96	96  HCO3		=	34	30	30	32	32	32	33	33  Glucose	=	62	23	54	93	53	139	84	82  BUN		=	19	20	20	15	11	11	10	8  Creat		=	0.3	< 0.2	0.3	0.3	0.3	0.4	0.2	0.3    Bilirubin		=	0.8	0.8	-	0.7	-	-	-	-  Alk Phos	=	85	83	-	89	-	-	-	-  SGOT		=	78	79	-	75	-	-	-	-  SGPT		=	70	67	-	72	-	-	-	-    LDH		=	-	-	-	332	-	-	-	-    Lipase		=	377	-	-	-	-	-	-	-    			10/21	10/21	10/22	10/23	10/24	10/25	10/26  			2:40	7:40	7	7	7	7:25	10:30  			PM	PM	AM	AM	AM	AM	AM  WBC		=	-	-	12	15	-	13	10  Neutro		=	-	-	84%	-	-	-	-  Bands		=	-	-	-	-	-	-	-  Hgb		=	-	-	12	13	-	12	12  Hct		=	-	-	36%	39%	-	34%	35%  Plts		=	-	-	164	139	-	114	119    PT		=	-	-	-	-	-	-	16.7  PTT		=	-	-	-	-	-	-	32.6  INR		=	-	-	-	-	-	-	1.52  Fibrinogen	=	-	-	-	-	-	-	866  D-dimer	=	-	-	-	-	-	-	785	-    Na		=	133	135	137	135	130	139	131  K		=	3.3	3.4	3.3	4.3	4.3	3.6	4.6  Cl		=	96	97	99	101	95	94	96  HCO3		=	34	32	31	29	25	27	30  Glucose	=	87	110	83	90	61	78	54  BUN		=	9	10	9	9	7	13	32  Creat		=	0.3	0.4	0.3	0.3	< 0.2	0.2	0.3    Bilirubin		=	-	-	-	-	-	-	-  Alk Phos	=	-	-	-	-	-	-	-  SGOT		=	-	-	-	-	-	-	-  SGPT		=	-	-	-	-	-	-	-    LDH		=	-	-	-	-	-	-	-    Amylase	=	-	-	-	117	-	-	-  Lipase		=	-	-	-	638	-	-	-    Lactate		=	-	-	-	-	-	1.1	-    On 10/26 it was noted that the patient had lower abdominal tenderness without guarding or rebound. The patient was treated with morphine without significant relief. 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 The HARI was not seen and was thought to be occluded. 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 This finding prompted the patient’s physicians to transfer this patient to [facility] as the patient was thought to have mesenteric ischemia (in order to revascularize the mesenteric vessels).      He has a medical history for the above noted chronic abdominal pain that was accompanied with a 70 - 80 pound weight loss over the last year. He has had symptoms of having gastro-esophageal reflux. He has had an inguinal herniorrhaphy. Prior to the current hospitalization he took:    1)	Oxycodone	2)	Imodium	3)	Protonix	4)	Colace  5)	Flagyl    He has no known medication allergies and he is not allergic to latex. He previously abused ethanol stopping 7 years ago. He smokes ½ of a pack of cigarettes per day for the last 42 years. His family history is significant for an uncle who had gastric carcinoma. He is  and resides with his spouse, Leyla Cabrera ([phone]) in a private home in . They live in the attic of a home with three flights of stairs to the attic. Another person resides at the residence (rents a room). He reportedly has another spouse in the [facility]. The patient’s cousin, who lives in [place], [place] is Dr. Bala Cabrera ([phone]). The patient has decreased hearing and impaired vision. He has had weakness and fatigue. He has not had fever or chills. He denied having had headaches or photophobia and has not had chest pain or palpitations. He has not had a cough or dyspnea. He has the above noted diffuse abdominal pain that has been accompanied with anorexia, nausea, and intermittently with emesis and multiple loose bowel movements per day (diarrhea). He has not been jaundiced nor has he had dark urine or acholic stools. He has not had urinary symptoms. His 10-point review of systems was otherwise negative. His internists are Dr. Samanta Strange and Dr. Davies.     On presentation to the vascular surgery floor at 5:30 PM he had a:    BP	=	  P	=	  R	=	  Temp	=	        Blood 10/19	-	Negative  Blood 10/19	-	Negative  Blood 10/19	-	HIV - Negative October 27th, 2017  8:30 AM    Initial General Surgical Consultation    I was asked to evaluate this patient and to provide a General Surgical Consultation. The patient’s chart was reviewed and he is examined.    This patient is a 61-year-old cachectic male who was transferred from the Medical Service at [facility] to the Vascular Surgery Service at [facility] at approximately 5:30 PM on 10/26/17 for ongoing care. Apparently, because he had complaints of having abdominal pain, diarrhea, and weight loss, in January or February, 2017, at [facility], the patient had upper and lower endoscopy as an outpatient reportedly finding some inflammation. He presented to the ED at [facility] at 1 PM on 9/8/17 with complaints of having abdominal pain. The pain had been present for one year prior to that evaluation and was accompanied with unintentional weight loss. On presentation to the ED he had a:    BP	=	114/76  P	=	83  R	=	15  Temp	=	37  O2 Sat	=	99%  VAS	=	2/10    He was awake, alert and in no acute distress. He had a soft, non-distended abdomen with epigastric and ian-umbilical tenderness. There was no guarding or rebound.    An IV cannula was inserted and fluid was given. Laboratory tests revealed a:    WBC	=	14	Na		=	137	Bilirubin		=	0.5  Neutro	=	81%	K		=	4.1	Alk Phos	=	72  Hgb	=	14	Cl		=	98	SGOT		=	27  Hct	=	43%	HCO3		=	27	SGPT		=	25  Plts	=	358	Glucose	=	85  			BUN		=	17	Albumin	=	4.0  			Creat		=	0.8	Lipase		=	66  pH	=	7.33  pCO2	=	60	Lactate		=	1.4  pO2	=	19  BE	=	4.1    An EKG revealed sinus bradycardia    A contrast enhanced CT scan of his abdomen and pelvis revealed mild wall thickening of loops of small bowel.    The patient was thought to have enteritis and was discharged to home. Apparently he then sought care at [facility] for his continued complaints of having abdominal pain. The patient’s family members report that at one time he was evaluated in an ED for complaints of pain alone with anorexia, nausea, emesis and diarrhea. He was thought to have drug seeking behavior. He was reportedly escorted out of the ED by hospital security. From 10/16 - 10/17 he was hospitalized at [facility] but the events and findings of that reported hospitalization are not now known.    He presented to the ED at [facility] at approximately 11:50 PM on 10/18/17 with complaints of having abdominal pain, anorexia, nausea, emesis intermittent diarrhea and weight loss. He denied having had fever or chills and had not been jaundiced. He denied having had chest pain or palpitations and had not had a cough of dyspnea. He denied having had urinary symptoms of frequency, hematuria, or dysuria. He had not had sick contacts or recent foreign travel.    On presentation to the ED he had a:    BP	=	133/88  P	=	72	Weight	=	46.7 Kg  R	=	16	Height	=	188 cm  Temp	=	34.5	BMI	=	13.2  O2 Sat	=	100%  VAS	=	6/10    He was awake, alert, and fully oriented. He was in mild acute distress. He was cachetic.  He was anicteric. His pupils were reactive.  He had regular heart tones and he did not have a murmur.  He had clear breath sounds bilaterally.  He had a soft, scaphoid abdomen that was neither distended nor tender. There was no guarding or rebound. He did not have palpable masses or abdominal wall hernias. He had active bowel sounds. A rectal exam revealed only multiple external hemorrhoids.    A contrast enhanced CT scan of his abdomen and pelvis failed to reveal evidence of a bowel obstruction or inflammation. He had extensive atheromatous plaque in the aorta.    He was diagnosed as having hypokalemia and hyponatremia and was given 2 liters on NS. He was admitted to the Medical Service. Potassium was administered. He had hypoglycemia and was given parenteral dextrose. A nephrology consultation was obtained and it was suggested that the patient had asymptomatic hypovolemic hyponatremia. A Gastroenterology consultation was also obtained and agreed with a previous suggestion that the patient should be evaluated for an insulinoma (as he had hypoglycemia). It was recommended that the patient’s stool be checked for ova and parasites as well as elastase and that the patient should undergo an upper endoscopy.     On 10/20 it was noted that the patient’s pain had improved and he had only one bowel movement. There was no nausea or emesis and he was able to tolerate oral liquids. The patient initially refused upper endoscopy but subsequently underwent upper endoscopy finding candidal esophagitis and mild gastritis. The mucosa of the duodenum appeared abnormal with villous blunting, pitting and cracking. Biopsies were obtained. He continued to have hypoglycemia and was given dextrose and glucagon. An endocrinology consultation suggested that the hypoglycemia was due to chronic malnutrition.    A CT scan of his chest was obtained on 10/19 that revealed multiple upper lobe bleb.    The patient had periods of forgetfulness and plans were formulated for him to have an MRI of his brain. He was stable and continued to gradually improve. Some consideration was given to place a PEG. On 10/23 it was noted that the patient had increased pain after eating and the gastroenterologist suggested that the previously planned MRI of the pancreas also be done with an MRA of the mesenteric vessels. In the morning of 10/24 the patient reported that had dyspnea for the last few days and supplemental oxygen was then administered. A chest radiograph in the evening of 10/24 revealed a spontaneous right pneumothorax (20% – 25%) that was initially treated “conservatively.” The patient also initially refused chest tube insertion. In the afternoon of 10/15 a chest tube was inserted (pigtail catheter). The non-contrast MRI of his brain and the non-contrast MRI of the pancreas failed to reveal any abnormalities. There was a concern that the patient had a neoplasm, he had leukocytosis, anemia and thrombocytopenia and a hematology / oncology consultation was also obtained. The anemia was thought to be due to chronic disease, and the acute decrease in the platelet count was thought to be due to DIC, HIT or progressive bone marrow disease. It was suggested that the lovonex be discontinued. The patient’s elevated PT and INR were thought to be due to vitamin K deficiency and vitamin K was given. He had abdominal pain on 10/25 and an abdominal radiograph revealed dilated loops of small bowel with several air-fluid levels. While he was at [facility] laboratory tests revealed a:    			10/19	10/19	10/19	10//20	10/20	10/20	10/21	10/21  			1	9:30	3	6	2:50	7:35	12:30	9  			AM	AM	PM	AM	PM	PM	AM	AM  WBC		=	12	15	-	13	-	-	-	10  Neutro		=	87%	88%	-	88%	-	-	-	83%  Bands		=	-	-	-	-	-	-	-	  Hgb		=	12	12	-	13	-	-	-	12  Hct		=	37%	34%	-	39%	-	-	-	38%  Plts		=	245	196	-	209	-	-	-	186    PT		=	-	-	15	15.6	-	-	-	-  PTT		=	-	-	-	38.8	-	-	-	-  INR		=	-	-	1.37	1.42	-	-	-	-    Na		=	121	130	131	132	132	133	136	132  K		=	3.3	3.2	3.7	4.2	3.4	3.0	3.5	3.5  Cl		=	81	94	97	97	97	95	96	96  HCO3		=	34	30	30	32	32	32	33	33  Glucose	=	62	23	54	93	53	139	84	82  BUN		=	19	20	20	15	11	11	10	8  Creat		=	0.3	< 0.2	0.3	0.3	0.3	0.4	0.2	0.3    Bilirubin		=	0.8	0.8	-	0.7	-	-	-	-  Alk Phos	=	85	83	-	89	-	-	-	-  SGOT		=	78	79	-	75	-	-	-	-  SGPT		=	70	67	-	72	-	-	-	-    LDH		=	-	-	-	332	-	-	-	-    Lipase		=	377	-	-	-	-	-	-	-    			10/21	10/21	10/22	10/23	10/24	10/25	10/26  			2:40	7:40	7	7	7	7:25	10:30  			PM	PM	AM	AM	AM	AM	AM  WBC		=	-	-	12	15	-	13	10  Neutro		=	-	-	84%	-	-	-	-  Bands		=	-	-	-	-	-	-	-  Hgb		=	-	-	12	13	-	12	12  Hct		=	-	-	36%	39%	-	34%	35%  Plts		=	-	-	164	139	-	114	119    PT		=	-	-	-	-	-	-	16.7  PTT		=	-	-	-	-	-	-	32.6  INR		=	-	-	-	-	-	-	1.52  Fibrinogen	=	-	-	-	-	-	-	866  D-dimer	=	-	-	-	-	-	-	785	-    Na		=	133	135	137	135	130	139	131  K		=	3.3	3.4	3.3	4.3	4.3	3.6	4.6  Cl		=	96	97	99	101	95	94	96  HCO3		=	34	32	31	29	25	27	30  Glucose	=	87	110	83	90	61	78	54  BUN		=	9	10	9	9	7	13	32  Creat		=	0.3	0.4	0.3	0.3	< 0.2	0.2	0.3    Bilirubin		=	-	-	-	-	-	-	-  Alk Phos	=	-	-	-	-	-	-	-  SGOT		=	-	-	-	-	-	-	-  SGPT		=	-	-	-	-	-	-	-    LDH		=	-	-	-	-	-	-	-    Amylase	=	-	-	-	117	-	-	-  Lipase		=	-	-	-	638	-	-	-    Lactate		=	-	-	-	-	-	1.1	-    On 10/26 it was noted that the patient had lower abdominal tenderness without guarding or rebound. The patient was treated with morphine without significant relief. 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 The HARI was not seen and was thought to be occluded. 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 This finding prompted the patient’s physicians to transfer this patient to [facility] as the patient was thought to have mesenteric ischemia (in order to revascularize the mesenteric vessels).      He has a medical history for the above noted chronic abdominal pain that was accompanied with a 70 - 80 pound weight loss over the last year. He has had symptoms of having gastro-esophageal reflux. He has had an inguinal herniorrhaphy. Prior to the current hospitalization he took:    1)	Oxycodone	2)	Imodium	3)	Protonix	4)	Colace  5)	Flagyl    He has no known medication allergies and he is not allergic to latex. He previously abused ethanol stopping 7 years ago. He smokes ½ of a pack of cigarettes per day for the last 42 years. His family history is significant for an uncle who had gastric carcinoma. He is  and resides with his spouse, Leyla Cabrera ([phone]) in a private home in . They live in the attic of a home with three flights of stairs to the attic. Another person resides at the residence (rents a room). He reportedly has another spouse in the [facility]. The patient’s cousin, who lives in [place], [place] is Dr. Bala Cabrera ([phone]). The patient has decreased hearing and impaired vision. He has corrective lenses. He has had weakness and fatigue. He has dentures. He has not had fever or chills. He denied having had headaches or photophobia and has not had chest pain or palpitations. He has not had a cough or dyspnea. He has the above noted diffuse abdominal pain that has been accompanied with anorexia, nausea, and intermittently with emesis and multiple loose bowel movements per day (diarrhea). He has not been jaundiced nor has he had dark urine or acholic stools. He has not had urinary symptoms. His 10-point review of systems was otherwise negative. His internists are Dr. Samanta Strange and Dr. Davies.     On presentation to the vascular surgery floor at 5:30 PM he had a:    BP	=	  P	=	  R	=	  Temp	=	        Blood 10/19	-	Negative  Blood 10/19	-	Negative  Blood 10/19	-	HIV - Negative October 27th, 2017  8:30 AM    Initial General Surgical Consultation    I was asked to evaluate this patient and to provide a General Surgical Consultation. The patient’s chart was reviewed and he is examined.    This patient is a 61-year-old cachectic male who was transferred from the Medical Service at [facility] to the Vascular Surgery Service at [facility] at approximately 5:30 PM on 10/26/17 for ongoing care. Apparently, because he had complaints of having abdominal pain, diarrhea, and weight loss, in January or February, 2017, at [facility], the patient had upper and lower endoscopy as an outpatient reportedly finding some inflammation. He presented to the ED at [facility] at 1 PM on 9/8/17 with complaints of having abdominal pain. The pain had been present for one year prior to that evaluation and was accompanied with unintentional weight loss. On presentation to the ED he had a:    BP	=	114/76  P	=	83  R	=	15  Temp	=	37  O2 Sat	=	99%  VAS	=	2/10    He was awake, alert and in no acute distress. He had a soft, non-distended abdomen with epigastric and ian-umbilical tenderness. There was no guarding or rebound.    An IV cannula was inserted and fluid was given. Laboratory tests revealed a:    WBC	=	14	Na		=	137	Bilirubin		=	0.5  Neutro	=	81%	K		=	4.1	Alk Phos	=	72  Hgb	=	14	Cl		=	98	SGOT		=	27  Hct	=	43%	HCO3		=	27	SGPT		=	25  Plts	=	358	Glucose	=	85  			BUN		=	17	Albumin	=	4.0  			Creat		=	0.8	Lipase		=	66  pH	=	7.33  pCO2	=	60	Lactate		=	1.4  pO2	=	19  BE	=	4.1    An EKG revealed sinus bradycardia    A contrast enhanced CT scan of his abdomen and pelvis revealed mild wall thickening of loops of small bowel.    The patient was thought to have enteritis and was discharged to home. Apparently he then sought care at [facility] for his continued complaints of having abdominal pain. The patient’s family members report that at one time he was evaluated in an ED for complaints of pain alone with anorexia, nausea, emesis and diarrhea. He was thought to have drug seeking behavior. He was reportedly escorted out of the ED by hospital security. From 10/16 - 10/17 he was hospitalized at [facility] but the events and findings of that reported hospitalization are not now known.    He presented to the ED at [facility] at approximately 11:50 PM on 10/18/17 with complaints of having abdominal pain, anorexia, nausea, emesis intermittent diarrhea and weight loss. He denied having had fever or chills and had not been jaundiced. He denied having had chest pain or palpitations and had not had a cough of dyspnea. He denied having had urinary symptoms of frequency, hematuria, or dysuria. He had not had sick contacts or recent foreign travel.    On presentation to the ED he had a:    BP	=	133/88  P	=	72	Weight	=	46.7 Kg  R	=	16	Height	=	188 cm  Temp	=	34.5	BMI	=	13.2  O2 Sat	=	100%  VAS	=	6/10    He was awake, alert, and fully oriented. He was in mild acute distress. He was cachetic.  He was anicteric. His pupils were reactive.  He had regular heart tones and he did not have a murmur.  He had clear breath sounds bilaterally.  He had a soft, scaphoid abdomen that was neither distended nor tender. There was no guarding or rebound. He did not have palpable masses or abdominal wall hernias. He had active bowel sounds. A rectal exam revealed only multiple external hemorrhoids.    A contrast enhanced CT scan of his abdomen and pelvis failed to reveal evidence of a bowel obstruction or inflammation. He had extensive atheromatous plaque in the aorta.    He was diagnosed as having hypokalemia and hyponatremia and was given 2 liters on NS. He was admitted to the Medical Service. Potassium was administered. He had hypoglycemia and was given parenteral dextrose. A nephrology consultation was obtained and it was suggested that the patient had asymptomatic hypovolemic hyponatremia. A Gastroenterology consultation was also obtained and agreed with a previous suggestion that the patient should be evaluated for an insulinoma (as he had hypoglycemia). It was recommended that the patient’s stool be checked for ova and parasites as well as elastase and that the patient should undergo an upper endoscopy.     On 10/20 it was noted that the patient’s pain had improved and he had only one bowel movement. There was no nausea or emesis and he was able to tolerate oral liquids. The patient initially refused upper endoscopy but subsequently underwent upper endoscopy finding candidal esophagitis and mild gastritis. The mucosa of the duodenum appeared abnormal with villous blunting, pitting and cracking. Biopsies were obtained. He continued to have hypoglycemia and was given dextrose and glucagon. An endocrinology consultation suggested that the hypoglycemia was due to chronic malnutrition.    A CT scan of his chest was obtained on 10/19 that revealed multiple upper lobe bleb.    The patient had periods of forgetfulness and plans were formulated for him to have an MRI of his brain. He was stable and continued to gradually improve. Some consideration was given to place a PEG. On 10/23 it was noted that the patient had increased pain after eating and the gastroenterologist suggested that the previously planned MRI of the pancreas also be done with an MRA of the mesenteric vessels. In the morning of 10/24 the patient reported that had dyspnea for the last few days and supplemental oxygen was then administered. A chest radiograph in the evening of 10/24 revealed a spontaneous right pneumothorax (20% – 25%) that was initially treated “conservatively.” The patient also initially refused chest tube insertion. In the afternoon of 10/15 a chest tube was inserted (pigtail catheter). The non-contrast MRI of his brain and the non-contrast MRI of the pancreas failed to reveal any abnormalities. There was a concern that the patient had a neoplasm, he had leukocytosis, anemia and thrombocytopenia and a hematology / oncology consultation was also obtained. The anemia was thought to be due to chronic disease, and the acute decrease in the platelet count was thought to be due to DIC, HIT or progressive bone marrow disease. It was suggested that the lovonex be discontinued. The patient’s elevated PT and INR were thought to be due to vitamin K deficiency and vitamin K was given. He had abdominal pain on 10/25 and an abdominal radiograph revealed dilated loops of small bowel with several air-fluid levels. While he was at [facility] laboratory tests revealed a:    			10/19	10/19	10/19	10//20	10/20	10/20	10/21	10/21  			1	9:30	3	6	2:50	7:35	12:30	9  			AM	AM	PM	AM	PM	PM	AM	AM  WBC		=	12	15	-	13	-	-	-	10  Neutro		=	87%	88%	-	88%	-	-	-	83%  Bands		=	-	-	-	-	-	-	-	  Hgb		=	12	12	-	13	-	-	-	12  Hct		=	37%	34%	-	39%	-	-	-	38%  Plts		=	245	196	-	209	-	-	-	186    PT		=	-	-	15	15.6	-	-	-	-  PTT		=	-	-	-	38.8	-	-	-	-  INR		=	-	-	1.37	1.42	-	-	-	-    Na		=	121	130	131	132	132	133	136	132  K		=	3.3	3.2	3.7	4.2	3.4	3.0	3.5	3.5  Cl		=	81	94	97	97	97	95	96	96  HCO3		=	34	30	30	32	32	32	33	33  Glucose	=	62	23	54	93	53	139	84	82  BUN		=	19	20	20	15	11	11	10	8  Creat		=	0.3	< 0.2	0.3	0.3	0.3	0.4	0.2	0.3    Bilirubin		=	0.8	0.8	-	0.7	-	-	-	-  Alk Phos	=	85	83	-	89	-	-	-	-  SGOT		=	78	79	-	75	-	-	-	-  SGPT		=	70	67	-	72	-	-	-	-    LDH		=	-	-	-	332	-	-	-	-    Lipase		=	377	-	-	-	-	-	-	-    			10/21	10/21	10/22	10/23	10/24	10/25	10/26  			2:40	7:40	7	7	7	7:25	10:30  			PM	PM	AM	AM	AM	AM	AM  WBC		=	-	-	12	15	-	13	10  Neutro		=	-	-	84%	-	-	-	-  Bands		=	-	-	-	-	-	-	-  Hgb		=	-	-	12	13	-	12	12  Hct		=	-	-	36%	39%	-	34%	35%  Plts		=	-	-	164	139	-	114	119    PT		=	-	-	-	-	-	-	16.7  PTT		=	-	-	-	-	-	-	32.6  INR		=	-	-	-	-	-	-	1.52  Fibrinogen	=	-	-	-	-	-	-	866  D-dimer	=	-	-	-	-	-	-	785	-    Na		=	133	135	137	135	130	139	131  K		=	3.3	3.4	3.3	4.3	4.3	3.6	4.6  Cl		=	96	97	99	101	95	94	96  HCO3		=	34	32	31	29	25	27	30  Glucose	=	87	110	83	90	61	78	54  BUN		=	9	10	9	9	7	13	32  Creat		=	0.3	0.4	0.3	0.3	< 0.2	0.2	0.3    Bilirubin		=	-	-	-	-	-	-	-  Alk Phos	=	-	-	-	-	-	-	-  SGOT		=	-	-	-	-	-	-	-  SGPT		=	-	-	-	-	-	-	-    LDH		=	-	-	-	-	-	-	-    Amylase	=	-	-	-	117	-	-	-  Lipase		=	-	-	-	638	-	-	-    Lactate		=	-	-	-	-	-	1.1	-    On 10/26 it was noted that the patient had lower abdominal tenderness without guarding or rebound. The patient was treated with morphine without significant relief. 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 The HARI was not seen and was thought to be occluded. 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 This finding prompted the patient’s physicians to transfer this patient to [facility] as the patient was thought to have mesenteric ischemia (in order to revascularize the mesenteric vessels).      He has a medical history for the above noted chronic abdominal pain that was accompanied with a 70 - 80 pound weight loss over the last year. He has had symptoms of having gastro-esophageal reflux. He has had an inguinal herniorrhaphy. Prior to the current hospitalization he took:    1)	Oxycodone	2)	Imodium	3)	Protonix	4)	Colace  5)	Flagyl    He has no known medication allergies and he is not allergic to latex. He previously abused ethanol stopping 7 years ago. He smokes ½ of a pack of cigarettes per day for the last 42 years. His family history is significant for an uncle who had gastric carcinoma. He is  and resides with his spouse, Leyla Cabrera ([phone]) in a private home in . They live in the attic of a home with three flights of stairs to the attic. Another person resides at the residence (rents a room). He reportedly has another spouse in the [facility]. The patient’s cousin, who lives in [place], [place] is Dr. Bala Cabrera ([phone]). The patient has decreased hearing and impaired vision. He has corrective lenses. He has had weakness and fatigue. He has dentures. He has not had fever or chills. He denied having had headaches or photophobia and has not had chest pain or palpitations. He has not had a cough or dyspnea. He has the above noted diffuse abdominal pain that has been accompanied with anorexia, nausea, and intermittently with emesis and multiple loose bowel movements per day (diarrhea). He has not been jaundiced nor has he had dark urine or acholic stools. He has not had urinary symptoms. His 10-point review of systems was otherwise negative. His internists are Dr. Samanta Strange and Dr. Davies.     On presentation to the vascular surgery floor at 5:30 PM he had a:    BP	=	112/76  P	=	101	Weight	=	46.3 Kg  R	=	20	Height	=	183 cm  Temp	=	36	BMI	=	13.8  O2 Sat	=	100%    He was awake, alert, and responsive.  He was emaciated and was being given supplemental oxygen.  He had a right chest tube in place and he had dyspnea.  He had left lower quadrant tenderness with guarding. He had bowel sounds.    On arrival to the hospital a rapid response team was activated for a depressed level of consciousness and hypoglycemia. He had a glucose of 12 and was given Dextrose. A thoracic surgery consultation was requested and the chest tube (pigtail catheter) was left in place. A critical care consultation was obtained and it was thought that the patient did not meet criteria for SICU admission. It was suggested that po intake be encouraged, that dextrose be given and glucose levels be checked every 1 hour. A general surgical consultation was obtained and noted that the patient had abdominal pain, liver failure, coagulopathy, electrolyte abnormalities and mesenteric artery occlusions. The consultant suggested fluid hydration, vitamin k administration, and vitamin administration. While being give dextrose his bedside glucose level was 39 and additional glucose was given. His lactic acid level decreased to 4.0 and he was hypothermic with a rectal temperature of 95.5. Subsequently he had a glucose level of 44 and again dextrose was given.     In the morning of 10/27 it was noted that the patient had diffuse abdominal pain. He was tachycardic and had diffuse abdominal tenderness with guarding and rebound. Since being at [facility] laboratory tests revealed a:    			10/26	10/27  			7:10	12:10  			PM	AM  WBC		=	11	9  Neutro		=	-	37%  Bands		=	-	34%  Hgb		=	8	9  Hct		=	22%	26%  Plts		=	76	82    PT		=	27.7	41.0  PTT		=	42.7	46.3  INR		=	2.43	3.57    Na		=	139	133  K		=	4.9	4.5  Cl		=	94	96  HCO3		=	18	25  Glucose	=	166	151  BUN		=	42	43  Creat		=	0.4	0.5    Bilirubin		=	0.9	1.0  Alk Phos	=	57	71  SGOT		=	1120	3611  SGPT		=	687	1832    pH		=	-	7.32  pCO2		=	-	50  pO2		=	-	57  BE		=	-	-0.5    Lactate		=	6.4	4.9    CPK		=	-	135  CPK-MB	=	-	6.9  Troponin	=	-	0.3	        Blood 10/19	-	Negative  Blood 10/19	-	Negative  Blood 10/19	-	HIV - Negative October 27th, 2017  8:30 AM    Initial General Surgical Consultation    I was asked to evaluate this patient and to provide a General Surgical Consultation. The patient’s chart was reviewed and he is examined.    This patient is a 61-year-old cachectic male who was transferred from the Medical Service at [facility] to the Vascular Surgery Service at [facility] at approximately 5:30 PM on 10/26/17 for ongoing care. Apparently, because he had complaints of having abdominal pain, diarrhea, and weight loss, in January or February, 2017, at [facility], the patient had upper and lower endoscopy as an outpatient reportedly finding some inflammation. He presented to the ED at [facility] at 1 PM on 9/8/17 with complaints of having abdominal pain. The pain had been present for one year prior to that evaluation and was accompanied with unintentional weight loss. On presentation to the ED he had a:    BP	=	114/76  P	=	83  R	=	15  Temp	=	37  O2 Sat	=	99%  VAS	=	2/10    He was awake, alert and in no acute distress. He had a soft, non-distended abdomen with epigastric and ian-umbilical tenderness. There was no guarding or rebound.    An IV cannula was inserted and fluid was given. Laboratory tests revealed a:    WBC	=	14	Na		=	137	Bilirubin		=	0.5  Neutro	=	81%	K		=	4.1	Alk Phos	=	72  Hgb	=	14	Cl		=	98	SGOT		=	27  Hct	=	43%	HCO3		=	27	SGPT		=	25  Plts	=	358	Glucose	=	85  			BUN		=	17	Albumin	=	4.0  			Creat		=	0.8	Lipase		=	66  pH	=	7.33  pCO2	=	60	Lactate		=	1.4  pO2	=	19  BE	=	4.1    An EKG revealed sinus bradycardia    A contrast enhanced CT scan of his abdomen and pelvis revealed mild wall thickening of loops of small bowel.    The patient was thought to have enteritis and was discharged to home. Apparently he then sought care at [facility] for his continued complaints of having abdominal pain. The patient’s family members report that at one time he was evaluated in an ED for complaints of pain alone with anorexia, nausea, emesis and diarrhea. He was thought to have drug seeking behavior. He was reportedly escorted out of the ED by hospital security. From 10/16 - 10/17 he was hospitalized at [facility] but the events and findings of that reported hospitalization are not now known.    He presented to the ED at [facility] at approximately 11:50 PM on 10/18/17 with complaints of having abdominal pain, anorexia, nausea, emesis intermittent diarrhea and weight loss. He denied having had fever or chills and had not been jaundiced. He denied having had chest pain or palpitations and had not had a cough of dyspnea. He denied having had urinary symptoms of frequency, hematuria, or dysuria. He had not had sick contacts or recent foreign travel.    On presentation to the ED he had a:    BP	=	133/88  P	=	72	Weight	=	46.7 Kg  R	=	16	Height	=	188 cm  Temp	=	34.5	BMI	=	13.2  O2 Sat	=	100%  VAS	=	6/10    He was awake, alert, and fully oriented. He was in mild acute distress. He was cachetic.  He was anicteric. His pupils were reactive.  He had regular heart tones and he did not have a murmur.  He had clear breath sounds bilaterally.  He had a soft, scaphoid abdomen that was neither distended nor tender. There was no guarding or rebound. He did not have palpable masses or abdominal wall hernias. He had active bowel sounds. A rectal exam revealed only multiple external hemorrhoids.    A contrast enhanced CT scan of his abdomen and pelvis failed to reveal evidence of a bowel obstruction or inflammation. He had extensive atheromatous plaque in the aorta.    He was diagnosed as having hypokalemia and hyponatremia and was given 2 liters on NS. He was admitted to the Medical Service. Potassium was administered. He had hypoglycemia and was given parenteral dextrose. A nephrology consultation was obtained and it was suggested that the patient had asymptomatic hypovolemic hyponatremia. A Gastroenterology consultation was also obtained and agreed with a previous suggestion that the patient should be evaluated for an insulinoma (as he had hypoglycemia). It was recommended that the patient’s stool be checked for ova and parasites as well as elastase and that the patient should undergo an upper endoscopy.     On 10/20 it was noted that the patient’s pain had improved and he had only one bowel movement. There was no nausea or emesis and he was able to tolerate oral liquids. The patient initially refused upper endoscopy but subsequently underwent upper endoscopy finding candidal esophagitis and mild gastritis. The mucosa of the duodenum appeared abnormal with villous blunting, pitting and cracking. Biopsies were obtained. He continued to have hypoglycemia and was given dextrose and glucagon. An endocrinology consultation suggested that the hypoglycemia was due to chronic malnutrition.    A CT scan of his chest was obtained on 10/19 that revealed multiple upper lobe bleb.    The patient had periods of forgetfulness and plans were formulated for him to have an MRI of his brain. He was stable and continued to gradually improve. Some consideration was given to place a PEG. On 10/23 it was noted that the patient had increased pain after eating and the gastroenterologist suggested that the previously planned MRI of the pancreas also be done with an MRA of the mesenteric vessels. In the morning of 10/24 the patient reported that had dyspnea for the last few days and supplemental oxygen was then administered. A chest radiograph in the evening of 10/24 revealed a spontaneous right pneumothorax (20% – 25%) that was initially treated “conservatively.” The patient also initially refused chest tube insertion. In the afternoon of 10/15 a chest tube was inserted (pigtail catheter). The non-contrast MRI of his brain and the non-contrast MRI of the pancreas failed to reveal any abnormalities. There was a concern that the patient had a neoplasm, he had leukocytosis, anemia and thrombocytopenia and a hematology / oncology consultation was also obtained. The anemia was thought to be due to chronic disease, and the acute decrease in the platelet count was thought to be due to DIC, HIT or progressive bone marrow disease. It was suggested that the Lovenox be discontinued. The patient’s elevated PT and INR were thought to be due to vitamin K deficiency and vitamin K was given. He had abdominal pain on 10/25 and an abdominal radiograph revealed dilated loops of small bowel with several air-fluid levels. While he was at [facility] laboratory tests revealed a:    			10/19	10/19	10/19	10//20	10/20	10/20	10/21	10/21  			1	9:30	3	6	2:50	7:35	12:30	9  			AM	AM	PM	AM	PM	PM	AM	AM  WBC		=	12	15	-	13	-	-	-	10  Neutro		=	87%	88%	-	88%	-	-	-	83%  Bands		=	-	-	-	-	-	-	-	  Hgb		=	12	12	-	13	-	-	-	12  Hct		=	37%	34%	-	39%	-	-	-	38%  Plts		=	245	196	-	209	-	-	-	186    PT		=	-	-	15	15.6	-	-	-	-  PTT		=	-	-	-	38.8	-	-	-	-  INR		=	-	-	1.37	1.42	-	-	-	-    Na		=	121	130	131	132	132	133	136	132  K		=	3.3	3.2	3.7	4.2	3.4	3.0	3.5	3.5  Cl		=	81	94	97	97	97	95	96	96  HCO3		=	34	30	30	32	32	32	33	33  Glucose	=	62	23	54	93	53	139	84	82  BUN		=	19	20	20	15	11	11	10	8  Creat		=	0.3	< 0.2	0.3	0.3	0.3	0.4	0.2	0.3    Bilirubin		=	0.8	0.8	-	0.7	-	-	-	-  Alk Phos	=	85	83	-	89	-	-	-	-  SGOT		=	78	79	-	75	-	-	-	-  SGPT		=	70	67	-	72	-	-	-	-    LDH		=	-	-	-	332	-	-	-	-    Lipase		=	377	-	-	-	-	-	-	-    			10/21	10/21	10/22	10/23	10/24	10/25	10/26  			2:40	7:40	7	7	7	7:25	10:30  			PM	PM	AM	AM	AM	AM	AM  WBC		=	-	-	12	15	-	13	10  Neutro		=	-	-	84%	-	-	-	-  Bands		=	-	-	-	-	-	-	-  Hgb		=	-	-	12	13	-	12	12  Hct		=	-	-	36%	39%	-	34%	35%  Plts		=	-	-	164	139	-	114	119    PT		=	-	-	-	-	-	-	16.7  PTT		=	-	-	-	-	-	-	32.6  INR		=	-	-	-	-	-	-	1.52  Fibrinogen	=	-	-	-	-	-	-	866  D-dimer	=	-	-	-	-	-	-	785	-    Na		=	133	135	137	135	130	139	131  K		=	3.3	3.4	3.3	4.3	4.3	3.6	4.6  Cl		=	96	97	99	101	95	94	96  HCO3		=	34	32	31	29	25	27	30  Glucose	=	87	110	83	90	61	78	54  BUN		=	9	10	9	9	7	13	32  Creat		=	0.3	0.4	0.3	0.3	< 0.2	0.2	0.3    Bilirubin		=	-	-	-	-	-	-	-  Alk Phos	=	-	-	-	-	-	-	-  SGOT		=	-	-	-	-	-	-	-  SGPT		=	-	-	-	-	-	-	-    LDH		=	-	-	-	-	-	-	-    Amylase	=	-	-	-	117	-	-	-  Lipase		=	-	-	-	638	-	-	-    Lactate		=	-	-	-	-	-	1.1	-    On 10/26 it was noted that the patient had lower abdominal tenderness without guarding or rebound. The patient was treated with morphine without significant relief. 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 The HARI was not seen and was thought to be occluded. 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 This finding prompted the patient’s physicians to transfer this patient to [facility] as the patient was thought to have mesenteric ischemia (in order to revascularize the mesenteric vessels).      He has a medical history for the above noted chronic abdominal pain that was accompanied with a 70 - 80 pound weight loss over the last year. He has had symptoms of having gastro-esophageal reflux. He has had an inguinal herniorrhaphy. Prior to the current hospitalization he took:    1)	Oxycodone	2)	Imodium	3)	Protonix	4)	Colace  5)	Flagyl    He has no known medication allergies and he is not allergic to latex. He previously abused ethanol stopping 7 years ago. He smokes ½ of a pack of cigarettes per day for the last 42 years. His family history is significant for an uncle who had gastric carcinoma. He is  and resides with his spouse, Leyla Cabrera ([phone]) in a private home in . They live in the attic of a home with three flights of stairs to the attic. Another person resides at the residence (rents a room). He reportedly was previously  to a woman who resides in the [facility] (now ). The patient’s cousin, who lives in [place], [place] is Dr. Bala Cabrera ([phone]). The patient has a daughter who resides in the [facility], a sister who lives in [place], and an brother who lives in [facility]. 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 The patient has decreased hearing and impaired vision. He has corrective lenses. He has had weakness and fatigue. He has dentures. He has not had fever or chills. He denied having had headaches or photophobia and has not had chest pain or palpitations. He has not had a cough or dyspnea. He has the above noted diffuse abdominal pain that has been accompanied with anorexia, nausea, and intermittently with emesis and multiple loose bowel movements per day (diarrhea). He has not been jaundiced nor has he had dark urine or acholic stools. He has not had urinary symptoms. His 10-point review of systems was otherwise negative. His internists are Dr. Samanta Strange and Dr. Davies.     On presentation to the vascular surgery floor at 5:30 PM he had a:    BP	=	112/76  P	=	101	Weight	=	46.3 Kg  R	=	20	Height	=	183 cm  Temp	=	36	BMI	=	13.8  O2 Sat	=	100%    He was awake, alert, and responsive.  He was emaciated and was being given supplemental oxygen.  He had a right chest tube in place and he had dyspnea.  He had left lower quadrant tenderness with guarding. He had bowel sounds.    On arrival to this hospital a rapid response team was activated for a depressed level of consciousness and hypoglycemia. He had a glucose of 12 and was given Dextrose. A thoracic surgery consultation was requested and the chest tube (pigtail catheter) was left in place. A critical care consultation was obtained and it was thought that the patient did not meet criteria for SICU admission. It was suggested that po intake be encouraged, that dextrose be given and glucose levels be checked every 1 hour. A general surgical consultation was obtained and noted that the patient had abdominal pain, liver failure, coagulopathy, electrolyte abnormalities and mesenteric artery occlusions. The consultant suggested fluid hydration, vitamin k administration, and vitamin administration. While being give dextrose his bedside glucose level was 39 and additional glucose was given. His lactic acid level decreased to 4.0 and he was hypothermic with a rectal temperature of 95.5. Subsequently he had a glucose level of 44 and again dextrose was given.     Overnight he has had a:        In the morning of 10/27 it was noted that the patient had diffuse abdominal pain. He was tachycardic and had diffuse abdominal tenderness with guarding and rebound. Since being at [facility] laboratory tests revealed a:    			10/26	10/27  			7:10	12:10  			PM	AM  WBC		=	11	9  Neutro		=	-	37%  Bands		=	-	34%  Hgb		=	8	9  Hct		=	22%	26%  Plts		=	76	82    PT		=	27.7	41.0  PTT		=	42.7	46.3  INR		=	2.43	3.57    Na		=	139	133  K		=	4.9	4.5  Cl		=	94	96  HCO3		=	18	25  Glucose	=	166	151  BUN		=	42	43  Creat		=	0.4	0.5    Bilirubin		=	0.9	1.0  Alk Phos	=	57	71  SGOT		=	1120	3611  SGPT		=	687	1832    pH		=	-	7.32  pCO2		=	-	50  pO2		=	-	57  BE		=	-	-0.5    Lactate		=	6.4	4.9    CPK		=	-	135  CPK-MB	=	-	6.9  Troponin	=	-	0.3	        Blood 10/19	-	Negative  Blood 10/19	-	Negative  Blood 10/19	-	HIV - Negative October 27th, 2017  8:30 AM    Initial General Surgical Consultation    I was asked to evaluate this patient and to provide a General Surgical Consultation. The patient’s chart was reviewed and he is examined.    This patient is a 61-year-old cachectic male who was transferred from the Medical Service at [facility] to the Vascular Surgery Service at [facility] at approximately 5:30 PM on 10/26/17 for ongoing care. Apparently, because he had complaints of having abdominal pain, diarrhea, and weight loss, in January or February, 2017, at [facility], the patient had upper and lower endoscopy as an outpatient reportedly finding some inflammation. He presented to the ED at [facility] at 1 PM on 9/8/17 with complaints of having abdominal pain. The pain had been present for one year prior to that evaluation and was accompanied with unintentional weight loss. On presentation to the ED he had a:    BP	=	114/76  P	=	83  R	=	15  Temp	=	37  O2 Sat	=	99%  VAS	=	2/10    He was awake, alert and in no acute distress. He had a soft, non-distended abdomen with epigastric and ian-umbilical tenderness. There was no guarding or rebound.    An IV cannula was inserted and fluid was given. Laboratory tests revealed a:    WBC	=	14	Na		=	137	Bilirubin		=	0.5  Neutro	=	81%	K		=	4.1	Alk Phos	=	72  Hgb	=	14	Cl		=	98	SGOT		=	27  Hct	=	43%	HCO3		=	27	SGPT		=	25  Plts	=	358	Glucose	=	85  			BUN		=	17	Albumin	=	4.0  			Creat		=	0.8	Lipase		=	66  pH	=	7.33  pCO2	=	60	Lactate		=	1.4  pO2	=	19  BE	=	4.1    An EKG revealed sinus bradycardia    A contrast enhanced CT scan of his abdomen and pelvis revealed mild wall thickening of loops of small bowel.    The patient was thought to have enteritis and was discharged to home. Apparently he then sought care at [facility] for his continued complaints of having abdominal pain. The patient’s family members report that at one time he was evaluated in an ED for complaints of pain alone with anorexia, nausea, emesis and diarrhea. He was thought to have drug seeking behavior. He was reportedly escorted out of the ED by hospital security. From 10/16 - 10/17 he was hospitalized at [facility] but the events and findings of that reported hospitalization are not now known.    He presented to the ED at [facility] at approximately 11:50 PM on 10/18/17 with complaints of having abdominal pain, anorexia, nausea, emesis intermittent diarrhea and weight loss. He denied having had fever or chills and had not been jaundiced. He denied having had chest pain or palpitations and had not had a cough of dyspnea. He denied having had urinary symptoms of frequency, hematuria, or dysuria. He had not had sick contacts or recent foreign travel.    On presentation to the ED he had a:    BP	=	133/88  P	=	72	Weight	=	46.7 Kg  R	=	16	Height	=	188 cm  Temp	=	34.5	BMI	=	13.2  O2 Sat	=	100%  VAS	=	6/10    He was awake, alert, and fully oriented. He was in mild acute distress. He was cachetic.  He was anicteric. His pupils were reactive.  He had regular heart tones and he did not have a murmur.  He had clear breath sounds bilaterally.  He had a soft, scaphoid abdomen that was neither distended nor tender. There was no guarding or rebound. He did not have palpable masses or abdominal wall hernias. He had active bowel sounds. A rectal exam revealed only multiple external hemorrhoids.    A contrast enhanced CT scan of his abdomen and pelvis failed to reveal evidence of a bowel obstruction or inflammation. He had extensive atheromatous plaque in the aorta.    He was diagnosed as having hypokalemia and hyponatremia and was given 2 liters on NS. He was admitted to the Medical Service. Potassium was administered. He had hypoglycemia and was given parenteral dextrose. A nephrology consultation was obtained and it was suggested that the patient had asymptomatic hypovolemic hyponatremia. A Gastroenterology consultation was also obtained and agreed with a previous suggestion that the patient should be evaluated for an insulinoma (as he had hypoglycemia). It was recommended that the patient’s stool be checked for ova and parasites as well as elastase and that the patient should undergo an upper endoscopy.     On 10/20 it was noted that the patient’s pain had improved and he had only one bowel movement. There was no nausea or emesis and he was able to tolerate oral liquids. The patient initially refused upper endoscopy but subsequently underwent upper endoscopy finding candidal esophagitis and mild gastritis. The mucosa of the duodenum appeared abnormal with villous blunting, pitting and cracking. Biopsies were obtained. He continued to have hypoglycemia and was given dextrose and glucagon. An endocrinology consultation suggested that the hypoglycemia was due to chronic malnutrition.    A CT scan of his chest was obtained on 10/19 that revealed multiple upper lobe bleb.    The patient had periods of forgetfulness and plans were formulated for him to have an MRI of his brain. He was stable and continued to gradually improve. Some consideration was given to place a PEG. On 10/23 it was noted that the patient had increased pain after eating and the gastroenterologist suggested that the previously planned MRI of the pancreas also be done with an MRA of the mesenteric vessels. In the morning of 10/24 the patient reported that had dyspnea for the last few days and supplemental oxygen was then administered. A chest radiograph in the evening of 10/24 revealed a spontaneous right pneumothorax (20% – 25%) that was initially treated “conservatively.” The patient also initially refused chest tube insertion. In the afternoon of 10/15 a chest tube was inserted (pigtail catheter). The non-contrast MRI of his brain and the non-contrast MRI of the pancreas failed to reveal any abnormalities. There was a concern that the patient had a neoplasm, he had leukocytosis, anemia and thrombocytopenia and a hematology / oncology consultation was also obtained. The anemia was thought to be due to chronic disease, and the acute decrease in the platelet count was thought to be due to DIC, HIT or progressive bone marrow disease. It was suggested that the Lovenox be discontinued. The patient’s elevated PT and INR were thought to be due to vitamin K deficiency and vitamin K was given. He had abdominal pain on 10/25 and an abdominal radiograph revealed dilated loops of small bowel with several air-fluid levels. While he was at [facility] laboratory tests revealed a:    			10/19	10/19	10/19	10//20	10/20	10/20	10/21	10/21  			1	9:30	3	6	2:50	7:35	12:30	9  			AM	AM	PM	AM	PM	PM	AM	AM  WBC		=	12	15	-	13	-	-	-	10  Neutro		=	87%	88%	-	88%	-	-	-	83%  Bands		=	-	-	-	-	-	-	-	  Hgb		=	12	12	-	13	-	-	-	12  Hct		=	37%	34%	-	39%	-	-	-	38%  Plts		=	245	196	-	209	-	-	-	186    PT		=	-	-	15	15.6	-	-	-	-  PTT		=	-	-	-	38.8	-	-	-	-  INR		=	-	-	1.37	1.42	-	-	-	-    Na		=	121	130	131	132	132	133	136	132  K		=	3.3	3.2	3.7	4.2	3.4	3.0	3.5	3.5  Cl		=	81	94	97	97	97	95	96	96  HCO3		=	34	30	30	32	32	32	33	33  Glucose	=	62	23	54	93	53	139	84	82  BUN		=	19	20	20	15	11	11	10	8  Creat		=	0.3	< 0.2	0.3	0.3	0.3	0.4	0.2	0.3    Bilirubin		=	0.8	0.8	-	0.7	-	-	-	-  Alk Phos	=	85	83	-	89	-	-	-	-  SGOT		=	78	79	-	75	-	-	-	-  SGPT		=	70	67	-	72	-	-	-	-    LDH		=	-	-	-	332	-	-	-	-    Lipase		=	377	-	-	-	-	-	-	-    			10/21	10/21	10/22	10/23	10/24	10/25	10/26  			2:40	7:40	7	7	7	7:25	10:30  			PM	PM	AM	AM	AM	AM	AM  WBC		=	-	-	12	15	-	13	10  Neutro		=	-	-	84%	-	-	-	-  Bands		=	-	-	-	-	-	-	-  Hgb		=	-	-	12	13	-	12	12  Hct		=	-	-	36%	39%	-	34%	35%  Plts		=	-	-	164	139	-	114	119    PT		=	-	-	-	-	-	-	16.7  PTT		=	-	-	-	-	-	-	32.6  INR		=	-	-	-	-	-	-	1.52  Fibrinogen	=	-	-	-	-	-	-	866  D-dimer	=	-	-	-	-	-	-	785	-    Na		=	133	135	137	135	130	139	131  K		=	3.3	3.4	3.3	4.3	4.3	3.6	4.6  Cl		=	96	97	99	101	95	94	96  HCO3		=	34	32	31	29	25	27	30  Glucose	=	87	110	83	90	61	78	54  BUN		=	9	10	9	9	7	13	32  Creat		=	0.3	0.4	0.3	0.3	< 0.2	0.2	0.3    Bilirubin		=	-	-	-	-	-	-	-  Alk Phos	=	-	-	-	-	-	-	-  SGOT		=	-	-	-	-	-	-	-  SGPT		=	-	-	-	-	-	-	-    LDH		=	-	-	-	-	-	-	-    Amylase	=	-	-	-	117	-	-	-  Lipase		=	-	-	-	638	-	-	-    Lactate		=	-	-	-	-	-	1.1	-    On 10/26 it was noted that the patient had lower abdominal tenderness without guarding or rebound. The patient was treated with morphine without significant relief. 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 The HARI was not seen and was thought to be occluded. 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 This finding prompted the patient’s physicians to transfer this patient to [facility] as the patient was thought to have mesenteric ischemia (in order to revascularize the mesenteric vessels).      He has a medical history for the above noted chronic abdominal pain that was accompanied with a 70 - 80 pound weight loss over the last year. He has had symptoms of having gastro-esophageal reflux. He has had an inguinal herniorrhaphy. Prior to the current hospitalization he took:    1)	Oxycodone	2)	Imodium	3)	Protonix	4)	Colace  5)	Flagyl    He has no known medication allergies and he is not allergic to latex. He previously abused ethanol stopping 7 years ago. He smokes ½ of a pack of cigarettes per day for the last 42 years. His family history is significant for an uncle who had gastric carcinoma. He is  and resides with his spouse, Leyla Cabrera ([phone]) in a private home in . They live in the attic of a home with three flights of stairs to the attic. Another person resides at the residence (rents a room). He reportedly was previously  to a woman who resides in the [facility] (now ). The patient’s cousin, who lives in [place], [place] is Dr. Bala Cabrera ([phone]). The patient has a daughter who resides in the [facility], a sister who lives in [place], and an brother who lives in [facility]. 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 The patient has decreased hearing and impaired vision. He has corrective lenses. He has had weakness and fatigue. He has dentures. He has not had fever or chills. He denied having had headaches or photophobia and has not had chest pain or palpitations. He has not had a cough or dyspnea. He has the above noted diffuse abdominal pain that has been accompanied with anorexia, nausea, and intermittently with emesis and multiple loose bowel movements per day (diarrhea). He has not been jaundiced nor has he had dark urine or acholic stools. He has not had urinary symptoms. His 10-point review of systems was otherwise negative. His internists are Dr. Samanta Strange and Dr. Davies.     On presentation to the vascular surgery floor at 5:30 PM he had a:    BP	=	112/76  P	=	101	Weight	=	46.3 Kg  R	=	20	Height	=	183 cm  Temp	=	36	BMI	=	13.8  O2 Sat	=	100%    He was awake, alert, and responsive.  He was emaciated and was being given supplemental oxygen.  He had a right chest tube in place and he had dyspnea.  He had left lower quadrant tenderness with guarding. He had bowel sounds.    On arrival to this hospital a rapid response team was activated for a depressed level of consciousness and hypoglycemia. He had a glucose of 12 and was given Dextrose. A thoracic surgery consultation was requested and the chest tube (pigtail catheter) was left in place. A critical care consultation was obtained and it was thought that the patient did not meet criteria for SICU admission. It was suggested that po intake be encouraged, that dextrose be given and glucose levels be checked every 1 hour. A general surgical consultation was obtained and noted that the patient had abdominal pain, liver failure, coagulopathy, electrolyte abnormalities and mesenteric artery occlusions. The consultant suggested fluid hydration, vitamin k administration, and vitamin administration. While being give dextrose his bedside glucose level was 39 and additional glucose was given. His lactic acid level decreased to 4.0 and he was hypothermic with a rectal temperature of 95.5. Subsequently he had a glucose level of 44 and again dextrose was given.     He was give parenteral fluid, enoxaparin, and     Overnight he has had a:    BP		=	96 – 115/57 - 78  P		=	98 - 109	O2 Sat	=	96% - 100%  R		=	20		I/O	=	950 in/650 out  Temp		=	35.3 – 36.6    Glucose	=	12 – 141    In the morning of 10/27 it was noted that the patient had diffuse abdominal pain. He was tachycardic and had diffuse abdominal tenderness with guarding and rebound. Since being at [facility] laboratory tests revealed a:    			10/26	10/27  			7:10	12:10  			PM	AM  WBC		=	11	9  Neutro		=	-	37%  Bands		=	-	34%  Hgb		=	8	9  Hct		=	22%	26%  Plts		=	76	82    PT		=	27.7	41.0  PTT		=	42.7	46.3  INR		=	2.43	3.57    Na		=	139	133  K		=	4.9	4.5  Cl		=	94	96  HCO3		=	18	25  Glucose	=	166	151  BUN		=	42	43  Creat		=	0.4	0.5    Bilirubin		=	0.9	1.0  Alk Phos	=	57	71  SGOT		=	1120	3611  SGPT		=	687	1832    pH		=	-	7.32  pCO2		=	-	50  pO2		=	-	57  BE		=	-	-0.5    Lactate		=	6.4	4.9    CPK		=	-	135  CPK-MB	=	-	6.9  Troponin	=	-	0.3	        Blood 10/19	-	Negative  Blood 10/19	-	Negative  Blood 10/19	-	HIV - Negative October 27th, 2017  8:30 AM    Initial General Surgical Consultation    I was asked to evaluate this patient and to provide a General Surgical Consultation. The patient’s chart was reviewed and he is examined.    This patient is a 61-year-old cachectic male who was transferred from the Medical Service at [facility] to the Vascular Surgery Service at [facility] at approximately 5:30 PM on 10/26/17 for ongoing care. Apparently, because he had complaints of having abdominal pain, diarrhea, and weight loss, in January or February, 2017, at [facility], the patient had upper and lower endoscopy as an outpatient reportedly finding some inflammation. He presented to the ED at [facility] at 1 PM on 9/8/17 with complaints of having abdominal pain. The pain had been present for one year prior to that evaluation and was accompanied with unintentional weight loss. On presentation to the ED he had a:    BP	=	114/76  P	=	83  R	=	15  Temp	=	37  O2 Sat	=	99%  VAS	=	2/10    He was awake, alert and in no acute distress. He had a soft, non-distended abdomen with epigastric and ian-umbilical tenderness. There was no guarding or rebound.    An IV cannula was inserted and fluid was given. Laboratory tests revealed a:    WBC	=	14	Na		=	137	Bilirubin		=	0.5  Neutro	=	81%	K		=	4.1	Alk Phos	=	72  Hgb	=	14	Cl		=	98	SGOT		=	27  Hct	=	43%	HCO3		=	27	SGPT		=	25  Plts	=	358	Glucose	=	85  			BUN		=	17	Albumin	=	4.0  			Creat		=	0.8	Lipase		=	66  pH	=	7.33  pCO2	=	60	Lactate		=	1.4  pO2	=	19  BE	=	4.1    An EKG revealed sinus bradycardia    A contrast enhanced CT scan of his abdomen and pelvis revealed mild wall thickening of loops of small bowel.    The patient was thought to have enteritis and was discharged to home. Apparently he then sought care at [facility] for his continued complaints of having abdominal pain. The patient’s family members report that at one time he was evaluated in an ED for complaints of pain alone with anorexia, nausea, emesis and diarrhea. He was thought to have drug seeking behavior. He was reportedly escorted out of the ED by hospital security. From 10/16 - 10/17 he was hospitalized at [facility] but the events and findings of that reported hospitalization are not now known.    He presented to the ED at [facility] at approximately 11:50 PM on 10/18/17 with complaints of having abdominal pain, anorexia, nausea, emesis intermittent diarrhea and weight loss. He denied having had fever or chills and had not been jaundiced. He denied having had chest pain or palpitations and had not had a cough of dyspnea. He denied having had urinary symptoms of frequency, hematuria, or dysuria. He had not had sick contacts or recent foreign travel.    On presentation to the ED he had a:    BP	=	133/88  P	=	72	Weight	=	46.7 Kg  R	=	16	Height	=	188 cm  Temp	=	34.5	BMI	=	13.2  O2 Sat	=	100%  VAS	=	6/10    He was awake, alert, and fully oriented. He was in mild acute distress. He was cachetic.  He was anicteric. His pupils were reactive.  He had regular heart tones and he did not have a murmur.  He had clear breath sounds bilaterally.  He had a soft, scaphoid abdomen that was neither distended nor tender. There was no guarding or rebound. He did not have palpable masses or abdominal wall hernias. He had active bowel sounds. A rectal exam revealed only multiple external hemorrhoids.    A contrast enhanced CT scan of his abdomen and pelvis failed to reveal evidence of a bowel obstruction or inflammation. He had extensive atheromatous plaque in the aorta.    He was diagnosed as having hypokalemia and hyponatremia and was given 2 liters on NS. He was admitted to the Medical Service. Potassium was administered. He had hypoglycemia and was given parenteral dextrose. A nephrology consultation was obtained and it was suggested that the patient had asymptomatic hypovolemic hyponatremia. A Gastroenterology consultation was also obtained and agreed with a previous suggestion that the patient should be evaluated for an insulinoma (as he had hypoglycemia). It was recommended that the patient’s stool be checked for ova and parasites as well as elastase and that the patient should undergo an upper endoscopy.     On 10/20 it was noted that the patient’s pain had improved and he had only one bowel movement. There was no nausea or emesis and he was able to tolerate oral liquids. The patient initially refused upper endoscopy but subsequently underwent upper endoscopy finding candidal esophagitis and mild gastritis. The mucosa of the duodenum appeared abnormal with villous blunting, pitting and cracking. Biopsies were obtained. He continued to have hypoglycemia and was given dextrose and glucagon. An endocrinology consultation suggested that the hypoglycemia was due to chronic malnutrition.    A CT scan of his chest was obtained on 10/19 that revealed multiple upper lobe bleb.    The patient had periods of forgetfulness and plans were formulated for him to have an MRI of his brain. He was stable and continued to gradually improve. Some consideration was given to place a PEG. On 10/23 it was noted that the patient had increased pain after eating and the gastroenterologist suggested that the previously planned MRI of the pancreas also be done with an MRA of the mesenteric vessels. In the morning of 10/24 the patient reported that had dyspnea for the last few days and supplemental oxygen was then administered. A chest radiograph in the evening of 10/24 revealed a spontaneous right pneumothorax (20% – 25%) that was initially treated “conservatively.” The patient also initially refused chest tube insertion. In the afternoon of 10/15 a chest tube was inserted (pigtail catheter). The non-contrast MRI of his brain and the non-contrast MRI of the pancreas failed to reveal any abnormalities. There was a concern that the patient had a neoplasm, he had leukocytosis, anemia and thrombocytopenia and a hematology / oncology consultation was also obtained. The anemia was thought to be due to chronic disease, and the acute decrease in the platelet count was thought to be due to DIC, HIT or progressive bone marrow disease. It was suggested that the Lovenox be discontinued. The patient’s elevated PT and INR were thought to be due to vitamin K deficiency and vitamin K was given. He had abdominal pain on 10/25 and an abdominal radiograph revealed dilated loops of small bowel with several air-fluid levels. While he was at [facility] laboratory tests revealed a:    			10/19	10/19	10/19	10//20	10/20	10/20	10/21	10/21  			1	9:30	3	6	2:50	7:35	12:30	9  			AM	AM	PM	AM	PM	PM	AM	AM  WBC		=	12	15	-	13	-	-	-	10  Neutro		=	87%	88%	-	88%	-	-	-	83%  Bands		=	-	-	-	-	-	-	-	  Hgb		=	12	12	-	13	-	-	-	12  Hct		=	37%	34%	-	39%	-	-	-	38%  Plts		=	245	196	-	209	-	-	-	186    PT		=	-	-	15	15.6	-	-	-	-  PTT		=	-	-	-	38.8	-	-	-	-  INR		=	-	-	1.37	1.42	-	-	-	-    Na		=	121	130	131	132	132	133	136	132  K		=	3.3	3.2	3.7	4.2	3.4	3.0	3.5	3.5  Cl		=	81	94	97	97	97	95	96	96  HCO3		=	34	30	30	32	32	32	33	33  Glucose	=	62	23	54	93	53	139	84	82  BUN		=	19	20	20	15	11	11	10	8  Creat		=	0.3	< 0.2	0.3	0.3	0.3	0.4	0.2	0.3    Bilirubin		=	0.8	0.8	-	0.7	-	-	-	-  Alk Phos	=	85	83	-	89	-	-	-	-  SGOT		=	78	79	-	75	-	-	-	-  SGPT		=	70	67	-	72	-	-	-	-    LDH		=	-	-	-	332	-	-	-	-    Lipase		=	377	-	-	-	-	-	-	-    			10/21	10/21	10/22	10/23	10/24	10/25	10/26  			2:40	7:40	7	7	7	7:25	10:30  			PM	PM	AM	AM	AM	AM	AM  WBC		=	-	-	12	15	-	13	10  Neutro		=	-	-	84%	-	-	-	-  Bands		=	-	-	-	-	-	-	-  Hgb		=	-	-	12	13	-	12	12  Hct		=	-	-	36%	39%	-	34%	35%  Plts		=	-	-	164	139	-	114	119    PT		=	-	-	-	-	-	-	16.7  PTT		=	-	-	-	-	-	-	32.6  INR		=	-	-	-	-	-	-	1.52  Fibrinogen	=	-	-	-	-	-	-	866  D-dimer	=	-	-	-	-	-	-	785	-    Na		=	133	135	137	135	130	139	131  K		=	3.3	3.4	3.3	4.3	4.3	3.6	4.6  Cl		=	96	97	99	101	95	94	96  HCO3		=	34	32	31	29	25	27	30  Glucose	=	87	110	83	90	61	78	54  BUN		=	9	10	9	9	7	13	32  Creat		=	0.3	0.4	0.3	0.3	< 0.2	0.2	0.3    Bilirubin		=	-	-	-	-	-	-	-  Alk Phos	=	-	-	-	-	-	-	-  SGOT		=	-	-	-	-	-	-	-  SGPT		=	-	-	-	-	-	-	-    LDH		=	-	-	-	-	-	-	-    Amylase	=	-	-	-	117	-	-	-  Lipase		=	-	-	-	638	-	-	-    Lactate		=	-	-	-	-	-	1.1	-    On 10/26 it was noted that the patient had lower abdominal tenderness without guarding or rebound. The patient was treated with morphine without significant relief. 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 The HARI was not seen and was thought to be occluded. 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 This finding prompted the patient’s physicians to transfer this patient to [facility] as the patient was thought to have mesenteric ischemia (in order to revascularize the mesenteric vessels).      He has a medical history for the above noted chronic abdominal pain that was accompanied with a 70 - 80 pound weight loss over the last year. He has had symptoms of having gastro-esophageal reflux. He has had an inguinal herniorrhaphy. Prior to the current hospitalization he took:    1)	Oxycodone	2)	Imodium	3)	Protonix	4)	Colace  5)	Flagyl    He has no known medication allergies and he is not allergic to latex. He previously abused ethanol stopping 7 years ago. He smokes ½ of a pack of cigarettes per day for the last 42 years. His family history is significant for an uncle who had gastric carcinoma. He is  and resides with his spouse, Leyla Cabrera ([phone]) in a private home in . They live in the attic of a home with three flights of stairs to the attic. Another person resides at the residence (rents a room). He reportedly was previously  to a woman who resides in the [facility] (now ). The patient’s cousin, who lives in [place], [place] is Dr. Bala Cabrera ([phone]). The patient has a daughter who resides in the [facility], a sister who lives in [place], and an brother who lives in [facility]. 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 The patient has decreased hearing and impaired vision. He has corrective lenses. He has had weakness and fatigue. He has dentures. He has not had fever or chills. He denied having had headaches or photophobia and has not had chest pain or palpitations. He has not had a cough or dyspnea. He has the above noted diffuse abdominal pain that has been accompanied with anorexia, nausea, and intermittently with emesis and multiple loose bowel movements per day (diarrhea). He has not been jaundiced nor has he had dark urine or acholic stools. He has not had urinary symptoms. His 10-point review of systems was otherwise negative. His internists are Dr. Samanta Strange and Dr. Davies.     On presentation to the vascular surgery floor at 5:30 PM he had a:    BP	=	112/76  P	=	101	Weight	=	46.3 Kg  R	=	20	Height	=	183 cm  Temp	=	36	BMI	=	13.8  O2 Sat	=	100%    He was awake, alert, and responsive.  He was emaciated and was being given supplemental oxygen.  He had a right chest tube in place and he had dyspnea.  He had left lower quadrant tenderness with guarding. He had bowel sounds.    On arrival to this hospital a rapid response team was activated for a depressed level of consciousness and hypoglycemia. He had a glucose of 12 and was given Dextrose. A thoracic surgery consultation was requested and the chest tube (pigtail catheter) was left in place. A critical care consultation was obtained and it was thought that the patient did not meet criteria for SICU admission. It was suggested that po intake be encouraged, that dextrose be given and glucose levels be checked every 1 hour. A general surgical consultation was obtained and noted that the patient had abdominal pain, liver failure, coagulopathy, electrolyte abnormalities and mesenteric artery occlusions. The consultant suggested fluid hydration, vitamin k administration, and vitamin administration. While being give dextrose his bedside glucose level was 39 and additional glucose was given. His lactic acid level decreased to 4.0 and he was hypothermic with a rectal temperature of 95.5. Subsequently he had a glucose level of 44 and again dextrose was given.     He was given IV fluid (D10 NS @ 100 ml/hour, a banana bag (NS + MVI, thiamine, and folate) and 5 mg of Vitamin K and a clear liquid diet. Overnight he has had a:    BP		=	96 – 115/57 - 78  P		=	98 - 109	O2 Sat	=	96% - 100%  R		=	20		I/O	=	950 in/650 out  Temp		=	35.3 – 36.6    Glucose	=	12 – 141    In the morning of 10/27 it was noted that the patient had diffuse abdominal pain. He was tachycardic and had diffuse abdominal tenderness with guarding and rebound. Since being at [facility] laboratory tests revealed a:    			10/26	10/27  			7:10	12:10  			PM	AM  WBC		=	11	9  Neutro		=	-	37%  Bands		=	-	34%  Hgb		=	8	9  Hct		=	22%	26%  Plts		=	76	82    PT		=	27.7	41.0  PTT		=	42.7	46.3  INR		=	2.43	3.57    Na		=	139	133  K		=	4.9	4.5  Cl		=	94	96  HCO3		=	18	25  Glucose	=	166	151  BUN		=	42	43  Creat		=	0.4	0.5    Bilirubin		=	0.9	1.0  Alk Phos	=	57	71  SGOT		=	1120	3611  SGPT		=	687	1832    pH		=	-	7.32  pCO2		=	-	50  pO2		=	-	57  BE		=	-	-0.5    Lactate		=	6.4	4.9    CPK		=	-	135  CPK-MB	=	-	6.9  Troponin	=	-	0.3	    Blood 10/19	-	Negative  Blood 10/19	-	Negative  Blood 10/19	-	HIV - Negative October 27th, 2017  8:30 AM    Initial General Surgical Consultation    I was asked to evaluate this patient and to provide a General Surgical Consultation. The patient’s chart was reviewed and he is examined.    This patient is a 61-year-old cachectic male who was transferred from the Medical Service at [facility] to the Vascular Surgery Service at [facility] at approximately 5:30 PM on 10/26/17 for ongoing care. Apparently, because he had complaints of having abdominal pain, diarrhea, and weight loss, in January or February, 2017, at [facility], the patient had upper and lower endoscopy as an outpatient reportedly finding some inflammation. He presented to the ED at [facility] at 1 PM on 9/8/17 with complaints of having abdominal pain. The pain had been present for one year prior to that evaluation and was accompanied with unintentional weight loss. On presentation to the ED he had a:    BP	=	114/76  P	=	83  R	=	15  Temp	=	37  O2 Sat	=	99%  VAS	=	2/10    He was awake, alert and in no acute distress. He had a soft, non-distended abdomen with epigastric and ian-umbilical tenderness. There was no guarding or rebound.    An IV cannula was inserted and fluid was given. Laboratory tests revealed a:    WBC	=	14	Na		=	137	Bilirubin		=	0.5  Neutro	=	81%	K		=	4.1	Alk Phos	=	72  Hgb	=	14	Cl		=	98	SGOT		=	27  Hct	=	43%	HCO3		=	27	SGPT		=	25  Plts	=	358	Glucose	=	85  			BUN		=	17	Albumin	=	4.0  			Creat		=	0.8	Lipase		=	66  pH	=	7.33  pCO2	=	60	Lactate		=	1.4  pO2	=	19  BE	=	4.1    An EKG revealed sinus bradycardia    A contrast enhanced CT scan of his abdomen and pelvis revealed mild wall thickening of loops of small bowel.    The patient was thought to have enteritis and was discharged to home. Apparently he then sought care at [facility] for his continued complaints of having abdominal pain. The patient’s family members report that at one time he was evaluated in an ED for complaints of pain alone with anorexia, nausea, emesis and diarrhea. He was thought to have drug seeking behavior. He was reportedly escorted out of the ED by hospital security. From 10/16 - 10/17 he was hospitalized at [facility] but the events and findings of that reported hospitalization are not now known.    He presented to the ED at [facility] at approximately 11:50 PM on 10/18/17 with complaints of having abdominal pain, anorexia, nausea, emesis intermittent diarrhea and weight loss. He denied having had fever or chills and had not been jaundiced. He denied having had chest pain or palpitations and had not had a cough of dyspnea. He denied having had urinary symptoms of frequency, hematuria, or dysuria. He had not had sick contacts or recent foreign travel.    On presentation to the ED he had a:    BP	=	133/88  P	=	72	Weight	=	46.7 Kg  R	=	16	Height	=	188 cm  Temp	=	34.5	BMI	=	13.2  O2 Sat	=	100%  VAS	=	6/10    He was awake, alert, and fully oriented. He was in mild acute distress. He was cachetic.  He was anicteric. His pupils were reactive.  He had regular heart tones and he did not have a murmur.  He had clear breath sounds bilaterally.  He had a soft, scaphoid abdomen that was neither distended nor tender. There was no guarding or rebound. He did not have palpable masses or abdominal wall hernias. He had active bowel sounds. A rectal exam revealed only multiple external hemorrhoids.    A contrast enhanced CT scan of his abdomen and pelvis failed to reveal evidence of a bowel obstruction or inflammation. He had extensive atheromatous plaque in the aorta.    He was diagnosed as having hypokalemia and hyponatremia and was given 2 liters on NS. He was admitted to the Medical Service. Potassium was administered. He had hypoglycemia and was given parenteral dextrose. A nephrology consultation was obtained and it was suggested that the patient had asymptomatic hypovolemic hyponatremia. A Gastroenterology consultation was also obtained and agreed with a previous suggestion that the patient should be evaluated for an insulinoma (as he had hypoglycemia). It was recommended that the patient’s stool be checked for ova and parasites as well as elastase and that the patient should undergo an upper endoscopy.     On 10/20 it was noted that the patient’s pain had improved and he had only one bowel movement. There was no nausea or emesis and he was able to tolerate oral liquids. The patient initially refused upper endoscopy but subsequently underwent upper endoscopy finding candidal esophagitis and mild gastritis. The mucosa of the duodenum appeared abnormal with villous blunting, pitting and cracking. Biopsies were obtained. He continued to have hypoglycemia and was given dextrose and glucagon. An endocrinology consultation suggested that the hypoglycemia was due to chronic malnutrition.    A CT scan of his chest was obtained on 10/19 that revealed multiple upper lobe bleb.    The patient had periods of forgetfulness and plans were formulated for him to have an MRI of his brain. He was stable and continued to gradually improve. Some consideration was given to place a PEG. On 10/23 it was noted that the patient had increased pain after eating and the gastroenterologist suggested that the previously planned MRI of the pancreas also be done with an MRA of the mesenteric vessels. In the morning of 10/24 the patient reported that had dyspnea for the last few days and supplemental oxygen was then administered. A chest radiograph in the evening of 10/24 revealed a spontaneous right pneumothorax (20% – 25%) that was initially treated “conservatively.” The patient also initially refused chest tube insertion. In the afternoon of 10/15 a chest tube was inserted (pigtail catheter). The non-contrast MRI of his brain and the non-contrast MRI of the pancreas failed to reveal any abnormalities. There was a concern that the patient had a neoplasm, he had leukocytosis, anemia and thrombocytopenia and a hematology / oncology consultation was also obtained. The anemia was thought to be due to chronic disease, and the acute decrease in the platelet count was thought to be due to DIC, HIT or progressive bone marrow disease. It was suggested that the Lovenox be discontinued. The patient’s elevated PT and INR were thought to be due to vitamin K deficiency and vitamin K was given. He had abdominal pain on 10/25 and an abdominal radiograph revealed dilated loops of small bowel with several air-fluid levels. While he was at [facility] laboratory tests revealed a:    			10/19	10/19	10/19	10//20	10/20	10/20	10/21	10/21  			1	9:30	3	6	2:50	7:35	12:30	9  			AM	AM	PM	AM	PM	PM	AM	AM  WBC		=	12	15	-	13	-	-	-	10  Neutro		=	87%	88%	-	88%	-	-	-	83%  Bands		=	-	-	-	-	-	-	-	  Hgb		=	12	12	-	13	-	-	-	12  Hct		=	37%	34%	-	39%	-	-	-	38%  Plts		=	245	196	-	209	-	-	-	186    PT		=	-	-	15	15.6	-	-	-	-  PTT		=	-	-	-	38.8	-	-	-	-  INR		=	-	-	1.37	1.42	-	-	-	-    Na		=	121	130	131	132	132	133	136	132  K		=	3.3	3.2	3.7	4.2	3.4	3.0	3.5	3.5  Cl		=	81	94	97	97	97	95	96	96  HCO3		=	34	30	30	32	32	32	33	33  Glucose	=	62	23	54	93	53	139	84	82  BUN		=	19	20	20	15	11	11	10	8  Creat		=	0.3	< 0.2	0.3	0.3	0.3	0.4	0.2	0.3    Bilirubin		=	0.8	0.8	-	0.7	-	-	-	-  Alk Phos	=	85	83	-	89	-	-	-	-  SGOT		=	78	79	-	75	-	-	-	-  SGPT		=	70	67	-	72	-	-	-	-    LDH		=	-	-	-	332	-	-	-	-    Lipase		=	377	-	-	-	-	-	-	-    			10/21	10/21	10/22	10/23	10/24	10/25	10/26  			2:40	7:40	7	7	7	7:25	10:30  			PM	PM	AM	AM	AM	AM	AM  WBC		=	-	-	12	15	-	13	10  Neutro		=	-	-	84%	-	-	-	-  Bands		=	-	-	-	-	-	-	-  Hgb		=	-	-	12	13	-	12	12  Hct		=	-	-	36%	39%	-	34%	35%  Plts		=	-	-	164	139	-	114	119    PT		=	-	-	-	-	-	-	16.7  PTT		=	-	-	-	-	-	-	32.6  INR		=	-	-	-	-	-	-	1.52  Fibrinogen	=	-	-	-	-	-	-	866  D-dimer	=	-	-	-	-	-	-	785	-    Na		=	133	135	137	135	130	139	131  K		=	3.3	3.4	3.3	4.3	4.3	3.6	4.6  Cl		=	96	97	99	101	95	94	96  HCO3		=	34	32	31	29	25	27	30  Glucose	=	87	110	83	90	61	78	54  BUN		=	9	10	9	9	7	13	32  Creat		=	0.3	0.4	0.3	0.3	< 0.2	0.2	0.3    Bilirubin		=	-	-	-	-	-	-	-  Alk Phos	=	-	-	-	-	-	-	-  SGOT		=	-	-	-	-	-	-	-  SGPT		=	-	-	-	-	-	-	-    LDH		=	-	-	-	-	-	-	-    Amylase	=	-	-	-	117	-	-	-  Lipase		=	-	-	-	638	-	-	-    Lactate		=	-	-	-	-	-	1.1	-    On 10/26 it was noted that the patient had lower abdominal tenderness without guarding or rebound. The patient was treated with morphine without significant relief. 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 The HARI was not seen and was thought to be occluded. 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 This finding prompted the patient’s physicians to transfer this patient to [facility] as the patient was thought to have mesenteric ischemia (in order to revascularize the mesenteric vessels).      He has a medical history for the above noted chronic abdominal pain that was accompanied with a 70 - 80 pound weight loss over the last year. He has had symptoms of having gastro-esophageal reflux. He has had an inguinal herniorrhaphy. Prior to the current hospitalization he took:    1)	Oxycodone	2)	Imodium	3)	Protonix	4)	Colace  5)	Flagyl    He has no known medication allergies and he is not allergic to latex. He previously abused ethanol stopping 7 years ago. He smokes ½ of a pack of cigarettes per day for the last 42 years. His family history is significant for an uncle who had gastric carcinoma. He is  and resides with his spouse, Leyla Cabrera ([phone]) in a private home in . They live in the attic of a home with three flights of stairs to the attic. Another person resides at the residence (rents a room). He reportedly was previously  to a woman who resides in the [facility] (now ). The patient’s cousin, who lives in [place], [place] is Dr. Bala Cabrera ([phone]). The patient has a daughter who resides in the [facility], a sister who lives in [place], and an brother who lives in [facility]. 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 The patient has decreased hearing and impaired vision. He has corrective lenses. He has had weakness and fatigue. He has dentures. He has not had fever or chills. He denied having had headaches or photophobia and has not had chest pain or palpitations. He has not had a cough or dyspnea. He has the above noted diffuse abdominal pain that has been accompanied with anorexia, nausea, and intermittently with emesis and multiple loose bowel movements per day (diarrhea). He has not been jaundiced nor has he had dark urine or acholic stools. He has not had urinary symptoms. His 10-point review of systems was otherwise negative. His internists are Dr. Samanta Strange and Dr. Davies.     On presentation to the vascular surgery floor at 5:30 PM he had a:    BP	=	112/76  P	=	101	Weight	=	46.3 Kg  R	=	20	Height	=	183 cm  Temp	=	36	BMI	=	13.8  O2 Sat	=	100%    He was awake, alert, and responsive.  He was emaciated and was being given supplemental oxygen.  He had a right chest tube in place and he had dyspnea.  He had left lower quadrant tenderness with guarding. He had bowel sounds.    On arrival to this hospital a rapid response team was activated for a depressed level of consciousness and hypoglycemia. He had a glucose of 12 and was given Dextrose. A thoracic surgery consultation was requested and the chest tube (pigtail catheter) was left in place. A critical care consultation was obtained and it was thought that the patient did not meet criteria for SICU admission. It was suggested that po intake be encouraged, that dextrose be given and glucose levels be checked every 1 hour. A general surgical consultation was obtained and noted that the patient had abdominal pain, liver failure, coagulopathy, electrolyte abnormalities and mesenteric artery occlusions. The consultant suggested fluid hydration, vitamin k administration, and vitamin administration. While being give dextrose his bedside glucose level was 39 and additional glucose was given. His lactic acid level decreased to 4.0 and he was hypothermic with a rectal temperature of 95.5. Subsequently he had a glucose level of 44 and again dextrose was given.     He was given IV fluid (D10 NS @ 100 ml/hour, a banana bag (NS + MVI, thiamine, and folate) and 5 mg of Vitamin K and a clear liquid diet. Overnight he has had a:    BP		=	96 – 115/57 - 78  P		=	98 - 109	O2 Sat	=	96% - 100%  R		=	20		I/O	=	950 in/650 out  Temp		=	35.3 – 36.6    Glucose	=	12 – 141    In the morning of 10/27 it was noted that the patient had diffuse abdominal pain. He was tachycardic and had diffuse abdominal tenderness with guarding and rebound. Since being at [facility] laboratory tests revealed a:    			10/26	10/27  			7:10	12:10  			PM	AM  WBC		=	11	9  Neutro		=	-	37%  Bands		=	-	34%  Hgb		=	8	9  Hct		=	22%	26%  Plts		=	76	82    PT		=	27.7	41.0  PTT		=	42.7	46.3  INR		=	2.43	3.57    Na		=	139	133  K		=	4.9	4.5  Cl		=	94	96  HCO3		=	18	25  Glucose	=	166	151  BUN		=	42	43  Creat		=	0.4	0.5    Bilirubin		=	0.9	1.0  Alk Phos	=	57	71  SGOT		=	1120	3611  SGPT		=	687	1832    pH		=	-	7.32  pCO2		=	-	50  pO2		=	-	57  BE		=	-	-0.5    Lactate		=	6.4	4.9    CPK		=	-	135  CPK-MB	=	-	6.9  Troponin	=	-	0.3	    Stool 10/22	-	Blake – negative  Stool 10/22	-	C diff – negative  Stool 10/22	-	No enteric pathogens.  Stool 10/22	-	No ova or parasites   Blood 10/19	-	Negative  Blood 10/19	-	Negative  Blood 10/19	-	HIV - Negative    I evaluated this patient at approximately 8:30 AM with Dr. Tavera. The patient was awake and interactive and appeared acutely ill.  He was anicteric and had reactive pupils. He was emaciated.  He did not have thrush. He was malodorous.  He did not have JVD.  He had decreased breath sounds and he was tachypnic. He had symmetrical chest wall movements and he had a pigtail catheter in the right chest.  He had a firm, diffusely tender abdomen with guarding and rebound. There was a healed right inguinal surgical scar and there were no palpable masses or abdominal wall hernias. He had normal external genitalia.    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 He is tachycardic, tachypnic, hypotensive, and hypothermic and has evidence of having peritonitis. He does not have a leukocytosis but he has bandemia and lactic acidosis. There is evidence that he has acute hepatic failure with an elevated INR, PT, lactic acid level, hypoglycemia and transaminitis. He is coagulopathic and thrombocytopenic. All of the above are due to systemic inflammatory response due to sepsis with septic shock and organ failure. 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 I have discussed this briefly with the patients spouse as well as at length with the vascular surgeons.    Plan to:    1)	Transfuse blood produces (plasma, possibly cryoprecipitate, platelets).  2)	Place additional IV cannulas to resuscitate this patient.  3)	Place an arterial cannula for close monitoring and to provide access for  	frequent phlebotomy.  4)	Give broad spectrum parenteral antibiotics.  5)	Plan operative exploration of his abdomen. I expect to find ischemic /  	gangrene of the bowel and would expect that there is extensive portions  	of non-viable bowel. Potentially his liver is also ischemic. Would plan to  	resect the non-viable bowel if only a limited amount of bowel required  	resection (i.e. if all or most of the intestine was non-viable will not resect  	the bowel and would close the abdomen and provide comfort care).  	This was discussed with the patient's spouse and the patient's cousin  	who is a cardiologist. The family members seem to understand the gravity  	of the patient's situation and likelihood of death. Could just provide  	palliation (comfort care) but the patient is relatively young (61-years-old)  	and I have not had significant prior exposure to this patient and therefore  	have only limited (the above) knowledge of his illnesses.  6)	Full support in place    Charlie Salgado October 27th, 2017  8:30 AM    Initial General Surgical Consultation    I was asked to evaluate this patient and to provide a General Surgical Consultation. The patient’s chart was reviewed and he is examined.    This patient is a 61-year-old cachectic male who was transferred from the Medical Service at [facility] to the Vascular Surgery Service at [facility] at approximately 5:30 PM on 10/26/17 for ongoing care. Apparently, because he had complaints of having abdominal pain, diarrhea, and weight loss, in January or February, 2017, at [facility], the patient had upper and lower endoscopy as an outpatient reportedly finding some inflammation. He presented to the ED at [facility] at 1 PM on 9/8/17 with complaints of having abdominal pain. The pain had been present for one year prior to that evaluation and was accompanied with unintentional weight loss. On presentation to the ED he had a:    BP	=	114/76  P	=	83  R	=	15  Temp	=	37  O2 Sat	=	99%  VAS	=	2/10    He was awake, alert and in no acute distress. He had a soft, non-distended abdomen with epigastric and ian-umbilical tenderness. There was no guarding or rebound.    An IV cannula was inserted and fluid was given. Laboratory tests revealed a:    WBC	=	14	Na		=	137	Bilirubin		=	0.5  Neutro	=	81%	K		=	4.1	Alk Phos	=	72  Hgb	=	14	Cl		=	98	SGOT		=	27  Hct	=	43%	HCO3		=	27	SGPT		=	25  Plts	=	358	Glucose	=	85  			BUN		=	17	Albumin	=	4.0  			Creat		=	0.8	Lipase		=	66  pH	=	7.33  pCO2	=	60	Lactate		=	1.4  pO2	=	19  BE	=	4.1    An EKG revealed sinus bradycardia    A contrast enhanced CT scan of his abdomen and pelvis revealed mild wall thickening of loops of small bowel.    The patient was thought to have enteritis and was discharged to home. Apparently he then sought care at [facility] for his continued complaints of having abdominal pain. The patient’s family members report that at one time he was evaluated in an ED for complaints of pain alone with anorexia, nausea, emesis and diarrhea. He was thought to have drug seeking behavior. He was reportedly escorted out of the ED by hospital security. From 10/16 - 10/17 he was hospitalized at [facility] but the events and findings of that reported hospitalization are not now known.    He presented to the ED at [facility] at approximately 11:50 PM on 10/18/17 with complaints of having abdominal pain, anorexia, nausea, emesis intermittent diarrhea and weight loss. He denied having had fever or chills and had not been jaundiced. He denied having had chest pain or palpitations and had not had a cough of dyspnea. He denied having had urinary symptoms of frequency, hematuria, or dysuria. He had not had sick contacts or recent foreign travel.    On presentation to the ED he had a:    BP	=	133/88  P	=	72	Weight	=	46.7 Kg  R	=	16	Height	=	188 cm  Temp	=	34.5	BMI	=	13.2  O2 Sat	=	100%  VAS	=	6/10    He was awake, alert, and fully oriented. He was in mild acute distress. He was cachetic.  He was anicteric. His pupils were reactive.  He had regular heart tones and he did not have a murmur.  He had clear breath sounds bilaterally.  He had a soft, scaphoid abdomen that was neither distended nor tender. There was no guarding or rebound. He did not have palpable masses or abdominal wall hernias. He had active bowel sounds. A rectal exam revealed only multiple external hemorrhoids.    A contrast enhanced CT scan of his abdomen and pelvis failed to reveal evidence of a bowel obstruction or inflammation. He had extensive atheromatous plaque in the aorta.    He was diagnosed as having hypokalemia and hyponatremia and was given 2 liters on NS. He was admitted to the Medical Service. Potassium was administered. He had hypoglycemia and was given parenteral dextrose. A nephrology consultation was obtained and it was suggested that the patient had asymptomatic hypovolemic hyponatremia. A Gastroenterology consultation was also obtained and agreed with a previous suggestion that the patient should be evaluated for an insulinoma (as he had hypoglycemia). It was recommended that the patient’s stool be checked for ova and parasites as well as elastase and that the patient should undergo an upper endoscopy.     On 10/20 it was noted that the patient’s pain had improved and he had only one bowel movement. There was no nausea or emesis and he was able to tolerate oral liquids. The patient initially refused upper endoscopy but subsequently underwent upper endoscopy finding candidal esophagitis and mild gastritis. The mucosa of the duodenum appeared abnormal with villous blunting, pitting and cracking. Biopsies were obtained. He continued to have hypoglycemia and was given dextrose and glucagon. An endocrinology consultation suggested that the hypoglycemia was due to chronic malnutrition.    A CT scan of his chest was obtained on 10/19 that revealed multiple upper lobe bleb.    The patient had periods of forgetfulness and plans were formulated for him to have an MRI of his brain. He was stable and continued to gradually improve. Some consideration was given to place a PEG. On 10/23 it was noted that the patient had increased pain after eating and the gastroenterologist suggested that the previously planned MRI of the pancreas also be done with an MRA of the mesenteric vessels. In the morning of 10/24 the patient reported that had dyspnea for the last few days and supplemental oxygen was then administered. A chest radiograph in the evening of 10/24 revealed a spontaneous right pneumothorax (20% – 25%) that was initially treated “conservatively.” The patient also initially refused chest tube insertion. In the afternoon of 10/15 a chest tube was inserted (pigtail catheter). The non-contrast MRI of his brain and the non-contrast MRI of the pancreas failed to reveal any abnormalities. There was a concern that the patient had a neoplasm, he had leukocytosis, anemia and thrombocytopenia and a hematology / oncology consultation was also obtained. The anemia was thought to be due to chronic disease, and the acute decrease in the platelet count was thought to be due to DIC, HIT or progressive bone marrow disease. It was suggested that the Lovenox be discontinued. The patient’s elevated PT and INR were thought to be due to vitamin K deficiency and vitamin K was given. He had abdominal pain on 10/25 and an abdominal radiograph revealed dilated loops of small bowel with several air-fluid levels. While he was at [facility] laboratory tests revealed a:    			10/19	10/19	10/19	10//20	10/20	10/20	10/21	10/21  			1	9:30	3	6	2:50	7:35	12:30	9  			AM	AM	PM	AM	PM	PM	AM	AM  WBC		=	12	15	-	13	-	-	-	10  Neutro		=	87%	88%	-	88%	-	-	-	83%  Bands		=	-	-	-	-	-	-	-	  Hgb		=	12	12	-	13	-	-	-	12  Hct		=	37%	34%	-	39%	-	-	-	38%  Plts		=	245	196	-	209	-	-	-	186    PT		=	-	-	15	15.6	-	-	-	-  PTT		=	-	-	-	38.8	-	-	-	-  INR		=	-	-	1.37	1.42	-	-	-	-    Na		=	121	130	131	132	132	133	136	132  K		=	3.3	3.2	3.7	4.2	3.4	3.0	3.5	3.5  Cl		=	81	94	97	97	97	95	96	96  HCO3		=	34	30	30	32	32	32	33	33  Glucose	=	62	23	54	93	53	139	84	82  BUN		=	19	20	20	15	11	11	10	8  Creat		=	0.3	< 0.2	0.3	0.3	0.3	0.4	0.2	0.3    Bilirubin		=	0.8	0.8	-	0.7	-	-	-	-  Alk Phos	=	85	83	-	89	-	-	-	-  SGOT		=	78	79	-	75	-	-	-	-  SGPT		=	70	67	-	72	-	-	-	-    LDH		=	-	-	-	332	-	-	-	-    Lipase		=	377	-	-	-	-	-	-	-    			10/21	10/21	10/22	10/23	10/24	10/25	10/26  			2:40	7:40	7	7	7	7:25	10:30  			PM	PM	AM	AM	AM	AM	AM  WBC		=	-	-	12	15	-	13	10  Neutro		=	-	-	84%	-	-	-	-  Bands		=	-	-	-	-	-	-	-  Hgb		=	-	-	12	13	-	12	12  Hct		=	-	-	36%	39%	-	34%	35%  Plts		=	-	-	164	139	-	114	119    PT		=	-	-	-	-	-	-	16.7  PTT		=	-	-	-	-	-	-	32.6  INR		=	-	-	-	-	-	-	1.52  Fibrinogen	=	-	-	-	-	-	-	866  D-dimer	=	-	-	-	-	-	-	785	-    Na		=	133	135	137	135	130	139	131  K		=	3.3	3.4	3.3	4.3	4.3	3.6	4.6  Cl		=	96	97	99	101	95	94	96  HCO3		=	34	32	31	29	25	27	30  Glucose	=	87	110	83	90	61	78	54  BUN		=	9	10	9	9	7	13	32  Creat		=	0.3	0.4	0.3	0.3	< 0.2	0.2	0.3    Bilirubin		=	-	-	-	-	-	-	-  Alk Phos	=	-	-	-	-	-	-	-  SGOT		=	-	-	-	-	-	-	-  SGPT		=	-	-	-	-	-	-	-    LDH		=	-	-	-	-	-	-	-    Amylase	=	-	-	-	117	-	-	-  Lipase		=	-	-	-	638	-	-	-    Lactate		=	-	-	-	-	-	1.1	-    On 10/26 it was noted that the patient had lower abdominal tenderness without guarding or rebound. The patient was treated with morphine without significant relief. 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 The HARI was not seen and was thought to be occluded. 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 This finding prompted the patient’s physicians to transfer this patient to [facility] as the patient was thought to have mesenteric ischemia (in order to revascularize the mesenteric vessels).      He has a medical history for the above noted chronic abdominal pain that was accompanied with a 70 - 80 pound weight loss over the last year. He has had symptoms of having gastro-esophageal reflux. He has had an inguinal herniorrhaphy. Prior to the current hospitalization he took:    1)	Oxycodone	2)	Imodium	3)	Protonix	4)	Colace  5)	Flagyl    He has no known medication allergies and he is not allergic to latex. He previously abused ethanol stopping 7 years ago. He smokes ½ of a pack of cigarettes per day for the last 42 years. His family history is significant for an uncle who had gastric carcinoma. He is  and resides with his spouse, Leyla Cabrera ([phone]) in a private home in . They live in the attic of a home with three flights of stairs to the attic. Another person resides at the residence (rents a room). He reportedly was previously  to a woman who resides in the [facility] (now ). The patient’s cousin, who lives in [place], [place] is Dr. Bala Cabrera ([phone]). The patient has a daughter who resides in the [facility], a sister who lives in [place], and an brother who lives in [facility]. 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 The patient has decreased hearing and impaired vision. He has corrective lenses. He has had weakness and fatigue. He has dentures. He has not had fever or chills. He denied having had headaches or photophobia and has not had chest pain or palpitations. He has not had a cough or dyspnea. He has the above noted diffuse abdominal pain that has been accompanied with anorexia, nausea, and intermittently with emesis and multiple loose bowel movements per day (diarrhea). He has not been jaundiced nor has he had dark urine or acholic stools. He has not had urinary symptoms. His 10-point review of systems was otherwise negative. His internists are Dr. Samanta Strange and Dr. Davies.     On presentation to the vascular surgery floor at 5:30 PM he had a:    BP	=	112/76  P	=	101	Weight	=	46.3 Kg  R	=	20	Height	=	183 cm  Temp	=	36	BMI	=	13.8  O2 Sat	=	100%    He was awake, alert, and responsive.  He was emaciated and was being given supplemental oxygen.  He had a right chest tube in place and he had dyspnea.  He had left lower quadrant tenderness with guarding. He had bowel sounds.    On arrival to this hospital a rapid response team was activated for a depressed level of consciousness and hypoglycemia. He had a glucose of 12 and was given Dextrose. A thoracic surgery consultation was requested and the chest tube (pigtail catheter) was left in place. A critical care consultation was obtained and it was thought that the patient did not meet criteria for SICU admission. It was suggested that po intake be encouraged, that dextrose be given and glucose levels be checked every 1 hour. A general surgical consultation was obtained and noted that the patient had abdominal pain, liver failure, coagulopathy, electrolyte abnormalities and mesenteric artery occlusions. The consultant suggested fluid hydration, vitamin k administration, and vitamin administration. While being give dextrose his bedside glucose level was 39 and additional glucose was given. His lactic acid level decreased to 4.0 and he was hypothermic with a rectal temperature of 95.5. Subsequently he had a glucose level of 44 and again dextrose was given.     He was given IV fluid (D10 NS @ 100 ml/hour, a banana bag (NS + MVI, thiamine, and folate) and 5 mg of Vitamin K and a clear liquid diet. Overnight he has had a:    BP		=	96 – 115/57 - 78  P		=	98 - 109	O2 Sat	=	96% - 100%  R		=	20		I/O	=	950 in/650 out  Temp		=	35.3 – 36.6    Glucose	=	12 – 141    In the morning of 10/27 it was noted that the patient had diffuse abdominal pain. He was tachycardic and had diffuse abdominal tenderness with guarding and rebound. Since being at [facility] laboratory tests revealed a:    			10/26	10/27	10/27  			7:10	12:10	12:20  			PM	AM	AM  WBC		=	11	9	-  Neutro		=	-	37%	-  Bands		=	-	34%	-  Hgb		=	8	9	-  Hct		=	22%	26%	-  Plts		=	76	82	-    PT		=	27.7	41.0	-  PTT		=	42.7	46.3	-  INR		=	2.43	3.57	-    Na		=	139	133	-  K		=	4.9	4.5	-  Cl		=	94	96	-  HCO3		=	18	25	-  Glucose	=	166	151	-  BUN		=	42	43	-  Creat		=	0.4	0.5	-    Bilirubin		=	0.9	1.0	-  Alk Phos	=	57	71	-  SGOT		=	1120	3611	-  SGPT		=	687	1832	-    pH		=	-	7.32	-  pCO2		=	-	50	-  pO2		=	-	57	-  BE		=	-	-0.5	-    Lactate		=	6.4	4.0	4.9    CPK		=	-	135	-  CPK-MB	=	-	6.9	-  Troponin	=	-	0.3	-	    Stool 10/22	-	Blake – negative  Stool 10/22	-	C diff – negative  Stool 10/22	-	No enteric pathogens.  Stool 10/22	-	No ova or parasites   Blood 10/19	-	Negative  Blood 10/19	-	Negative  Blood 10/19	-	HIV - Negative    I evaluated this patient at approximately 8:30 AM with Dr. Tavera. The patient was awake and interactive and appeared acutely ill.  He was anicteric and had reactive pupils. He was emaciated.  He did not have thrush. He was malodorous.  He did not have JVD.  He had decreased breath sounds and he was tachypnic. He had symmetrical chest wall movements and he had a pigtail catheter in the right chest.  He had a firm, diffusely tender abdomen with guarding and rebound. There was a healed right inguinal surgical scar and there were no palpable masses or abdominal wall hernias. He had normal external genitalia.    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 He is tachycardic, tachypnic, hypotensive, and hypothermic and has evidence of having peritonitis. He does not have a leukocytosis but he has bandemia and lactic acidosis. There is evidence that he has acute hepatic failure with an elevated INR, PT, lactic acid level, hypoglycemia and transaminitis. He is coagulopathic and thrombocytopenic. All of the above are due to systemic inflammatory response due to sepsis with septic shock and organ failure. 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 I have discussed this briefly with the patients spouse as well as at length with the vascular surgeons.    Plan to:    1)	Transfuse blood produces (plasma, possibly cryoprecipitate, platelets).  2)	Place additional IV cannulas to resuscitate this patient.  3)	Place an arterial cannula for close monitoring and to provide access for  	frequent phlebotomy.  4)	Give broad spectrum parenteral antibiotics.  5)	Plan operative exploration of his abdomen. I expect to find ischemic /  	gangrene of the bowel and would expect that there is extensive portions  	of non-viable bowel. Potentially his liver is also ischemic. Would plan to  	resect the non-viable bowel if only a limited amount of bowel required  	resection (i.e. if all or most of the intestine was non-viable will not resect  	the bowel and would close the abdomen and provide comfort care).  	This was discussed with the patient's spouse and the patient's cousin  	who is a cardiologist. The family members seem to understand the gravity  	of the patient's situation and likelihood of death. Could just provide  	palliation (comfort care) but the patient is relatively young (61-years-old)  	and I have not had significant prior exposure to this patient and therefore  	have only limited (the above) knowledge of his illnesses.  6)	Full support in place    Charlie Salgado

## 2017-10-27 NOTE — PROVIDER CONTACT NOTE (OTHER) - ASSESSMENT
Patient VSS Patient VSS. Patient axox3. Patient npo with d10 infusing at 50cc/hr and Banana bag at 100 cc/hr

## 2017-10-27 NOTE — PROGRESS NOTE ADULT - SUBJECTIVE AND OBJECTIVE BOX
GENERAL SURGERY PROGRESS NOTE:    ===================================  B Team Surgery Pager [phone]  ===================================    Subjective:  Pt reports severe abdominal pain diffusely.      Objective:    PE:  Gen: In acute distress, malnourished  Resp: airway patent, increased WoB  CVS: Tachy  Abd: tense, TTP diffusely, w/ rebound and guarding  Ext: cachetic, palpable pulses  Neuro: AAOx3, no focal deficits    Vital Signs Last 24 Hrs  T(C): 36.6 (27 Oct 2017 06:28), Max: 36.8 (26 Oct 2017 14:52)  T(F): 97.8 (27 Oct 2017 06:28), Max: 98.2 (26 Oct 2017 14:52)  HR: 100 (27 Oct 2017 06:28) (98 - 104)  BP: 104/67 (27 Oct 2017 06:28) (96/67 - 139/95)  BP(mean): --  RR: 20 (27 Oct 2017 06:28) (17 - 20)  SpO2: 96% (27 Oct 2017 06:28) (96% - 100%)    I&O's Detail    26 Oct 2017 07:01  -  27 Oct 2017 07:00  --------------------------------------------------------  IN:    dextrose 10% + sodium chloride 0.9%: 300 mL    IV PiggyBack: 50 mL    multivitamin/thiamine/folic acid in sodium chloride 0.9%: 600 mL  Total IN: 950 mL    OUT:    Voided: 650 mL  Total OUT: 650 mL    Total NET: 300 mL          Daily Height in cm: 182.88 (26 Oct 2017 17:33)    Daily Weight in k.8 (27 Oct 2017 00:54)    MEDICATIONS  (STANDING):  dextrose 10% + sodium chloride 0.9% 1000 milliLiter(s) (50 mL/Hr) IV Continuous <Continuous>  dextrose 5%. 1000 milliLiter(s) (50 mL/Hr) IV Continuous <Continuous>  dextrose 50% Injectable 12.5 Gram(s) IV Push once  dextrose 50% Injectable 25 Gram(s) IV Push once  dextrose 50% Injectable 25 Gram(s) IV Push once  enoxaparin Injectable 40 milliGRAM(s) SubCutaneous every 24 hours  influenza   Vaccine 0.5 milliLiter(s) IntraMuscular once  multivitamin/thiamine/folic acid in sodium chloride 0.9% 1000 milliLiter(s) (100 mL/Hr) IV Continuous <Continuous>  thiamine IVPB 500 milliGRAM(s) IV Intermittent daily    MEDICATIONS  (PRN):  dextrose Gel 1 Dose(s) Oral once PRN Blood Glucose LESS THAN 70 milliGRAM(s)/deciLiter  glucagon  Injectable 1 milliGRAM(s) IntraMuscular once PRN Glucose <70 milliGRAM(s)/deciLiter      LABS:                        8.7    8.64  )-----------( 82       ( 27 Oct 2017 00:10 )             25.8     10-27    133<L>  |  96<L>  |  43<H>  ----------------------------<  151<H>  4.5   |  25  |  0.48<L>    Ca    7.4<L>      27 Oct 2017 00:10  Phos  4.6     10-  Mg     2.0     10-26    TPro  4.1<L>  /  Alb  2.2<L>  /  TBili  1.0  /  DBili  0.4<H>  /  AST  3611<H>  /  ALT  1832<H>  /  AlkPhos  71  10-27    PT/INR - ( 27 Oct 2017 00:10 )   PT: 41.0 SEC;   INR: 3.57          PTT - ( 27 Oct 2017 00:10 )  PTT:46.3 SEC  Urinalysis Basic - ( 26 Oct 2017 19:00 )    Color: YELLOW / Appearance: CLEAR / S.033 / pH: 6.0  Gluc: 250 / Ketone: NEGATIVE  / Bili: NEGATIVE / Urobili: NORMAL E.U.   Blood: NEGATIVE / Protein: 100 / Nitrite: NEGATIVE   Leuk Esterase: NEGATIVE / RBC: 0-2 / WBC 2-5   Sq Epi: x / Non Sq Epi: x / Bacteria: x        RADIOLOGY & ADDITIONAL STUDIES:  < from: MRA Abdomen w/Cont (10.26.17 @ 12:50) >  Impression: High-grade stenosis at the proximal celiac axis. Occlusion at   the proximal SMA. The HARI is not visualized, likely occluded.    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    < end of copied text >

## 2017-10-27 NOTE — PROVIDER CONTACT NOTE (CRITICAL VALUE NOTIFICATION) - ACTION/TREATMENT ORDERED:
SICU consult.  MD aware
EKG performed as ordered. Will continue to monitor.
Will continue to monitor.

## 2017-10-28 NOTE — PROGRESS NOTE ADULT - SUBJECTIVE AND OBJECTIVE BOX
SICU Daily Progress Note  =====================================================  Interval/Overnight Events:    OR - extensive necrotic bowel, purulence, bile.  pt closed without intervention.    POD #  1        	SICU Day #    2    HPI:  61M transferred from H for vascular surgery. Patient was initially admitted for diarrhea and abdominal pain, a week ago at [facility]. On admission, patient was hyponatremic, hypokalemic and hypophospatemic, with varable blood glucoses ranging from 39 - 300. EGD was signififcant for gastritis, but no mass or malignancy. Colonoscopy done earlier this year (around March according to patient's spouse) was insignificant.. He was empirically started on fluconazole for possible esophageal candidiasis but fungal stains were negative on biopsy and flucoanozole was discontinued. HIV negative. 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 Patient was transferred to [facility] today for surgery. Of note, patient had a right sided pneumothorax on 10/24, chest tube was placed on 10/25, and there is a plan to remove chest tube on 10/27. Patient has been getting 1g NaCl BID for hyponatremia.       Allergies: No Known Allergies      MEDICATIONS:   --------------------------------------------------------------------------------------  Neurologic Medications  fentaNYL   Infusion 2 MICROgram(s)/kG/Hr IV Continuous <Continuous>  LORazepam   Injectable 2 milliGRAM(s) IV Push every 4 hours PRN Agitation    Respiratory Medications    Cardiovascular Medications  norepinephrine Infusion 0.1 MICROgram(s)/kG/Min IV Continuous <Continuous>    Gastrointestinal Medications  dextrose 10%. 1000 milliLiter(s) IV Continuous <Continuous>    Genitourinary Medications    Hematologic/Oncologic Medications    Antimicrobial/Immunologic Medications    Endocrine/Metabolic Medications  vasopressin Infusion 0.04 Unit(s)/Min IV Continuous <Continuous>    Topical/Other Medications    --------------------------------------------------------------------------------------    VITAL SIGNS, INS/OUTS (last 24 hours):  --------------------------------------------------------------------------------------  ICU Vital Signs Last 24 Hrs  T(C): 36.2 (28 Oct 2017 04:00), Max: 36.8 (27 Oct 2017 19:30)  T(F): 97.2 (28 Oct 2017 04:00), Max: 98.3 (27 Oct 2017 19:30)  HR: 106 (28 Oct 2017 04:00) (64 - 122)  BP: 93/69 (27 Oct 2017 17:45) (84/59 - 110/57)  BP(mean): 75 (27 Oct 2017 17:45) (75 - 86)  ABP: 119/61 (28 Oct 2017 04:00) (94/53 - 120/62)  ABP(mean): 76 (28 Oct 2017 04:00) (66 - 78)  RR: 15 (28 Oct 2017 04:00) (15 - 28)  SpO2: 97% (27 Oct 2017 23:54) (95% - 100%)    --------------------------------------------------------------------------------------    EXAM  NEUROLOGY  Exam: intubated, sedated    HEENT  Exam: Normocephalic, atraumatic, EOMI.      RESPIRATORY  Mechanical Ventilation: Mode: AC/ CMV (Assist Control/ Continuous Mandatory Ventilation), RR (machine): 15, TV (machine): 600, FiO2: 100, PEEP: 7, MAP: 13, PIP: 28    CARDIOVASCULAR  Exam: S1, S2.  Tachycardia    GI/NUTRITION  Exam: Abdomen soft, Non-distended. dressing with some strikethrough.  liquid dark bowel movment      VASCULAR  Exam: Extremities warm, pink, well-perfused.    MUSCULOSKELETAL  Exam: sedated    SKIN  Exam: Good skin turgor, no skin breakdown.    METABOLIC/FLUIDS/ELECTROLYTES  dextrose 10%. 1000 milliLiter(s) IV Continuous <Continuous>      HEMATOLOGIC  [x] VTE Prophylaxis:   Transfusions:	[] PRBC	[] Platelets		[] FFP	[] Cryoprecipitate    INFECTIOUS DISEASE  Antimicrobials/Immunologic Medications:    Day #      of     ***    Tubes/Lines/Drains  ***  [x] Peripheral IV  [x] Central Venous Line     	[] R	[] L	[] IJ	[] Fem	[] SC	Date Placed:   [] Arterial Line		[] R	[] L	[] Fem	[] Rad	[] Ax	Date Placed:   [] PICC		[] Midline		[] Mediport  [] Urinary Catheter		Date Placed:   [x] Necessity of urinary, arterial, and venous catheters discussed    LABS  --------------------------------------------------------------------------------------  no labs  --------------------------------------------------------------------------------------    OTHER LABORATORY:       ASSESSMENT:  61y Male with extensive necrotic bowel.  No resection during surgical procedure.  Pt is DNR, will not escalate care.    PLAN:     Neurologic: continue sedation with fentanyl, ativan as needed  Respiratory: continue ventilatory support  Cardiovascular: continue pressors, will not escalate  Gastrointestinal/Nutrition: NPO  Renal/Genitourinary: ALESSIA mckeon  Hematologic: none  Infectious Disease: none  Tubes/Lines/Drains: continue all current lines  Endocrine: none  Disposition: SICU    --------------------------------------------------------------------------------------    Critical Care Diagnoses:  respiratory failure, ischemic/necrotic bowel, septic shock SICU Daily Progress Note  =====================================================  Interval/Overnight Events:    OR - extensive necrotic bowel, purulence, bile.  pt closed without intervention.    POD #  1        	SICU Day #    2    HPI:  61M transferred from H for vascular surgery. Patient was initially admitted for diarrhea and abdominal pain, a week ago at [facility]. On admission, patient was hyponatremic, hypokalemic and hypophospatemic, with varable blood glucoses ranging from 39 - 300. EGD was signififcant for gastritis, but no mass or malignancy. Colonoscopy done earlier this year (around March according to patient's spouse) was insignificant.. He was empirically started on fluconazole for possible esophageal candidiasis but fungal stains were negative on biopsy and flucoanozole was discontinued. HIV negative. 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 Patient was transferred to [facility] today for surgery. Of note, patient had a right sided pneumothorax on 10/24, chest tube was placed on 10/25, and there is a plan to remove chest tube on 10/27. Patient has been getting 1g NaCl BID for hyponatremia.       Allergies: No Known Allergies      MEDICATIONS:   --------------------------------------------------------------------------------------  Neurologic Medications  fentaNYL   Infusion 2 MICROgram(s)/kG/Hr IV Continuous <Continuous>  LORazepam   Injectable 2 milliGRAM(s) IV Push every 4 hours PRN Agitation    Respiratory Medications    Cardiovascular Medications  norepinephrine Infusion 0.1 MICROgram(s)/kG/Min IV Continuous <Continuous>    Gastrointestinal Medications  dextrose 10%. 1000 milliLiter(s) IV Continuous <Continuous>    Genitourinary Medications    Hematologic/Oncologic Medications    Antimicrobial/Immunologic Medications    Endocrine/Metabolic Medications  vasopressin Infusion 0.04 Unit(s)/Min IV Continuous <Continuous>    Topical/Other Medications    --------------------------------------------------------------------------------------    VITAL SIGNS, INS/OUTS (last 24 hours):  --------------------------------------------------------------------------------------  ICU Vital Signs Last 24 Hrs  T(C): 36.2 (28 Oct 2017 04:00), Max: 36.8 (27 Oct 2017 19:30)  T(F): 97.2 (28 Oct 2017 04:00), Max: 98.3 (27 Oct 2017 19:30)  HR: 106 (28 Oct 2017 04:00) (64 - 122)  BP: 93/69 (27 Oct 2017 17:45) (84/59 - 110/57)  BP(mean): 75 (27 Oct 2017 17:45) (75 - 86)  ABP: 119/61 (28 Oct 2017 04:00) (94/53 - 120/62)  ABP(mean): 76 (28 Oct 2017 04:00) (66 - 78)  RR: 15 (28 Oct 2017 04:00) (15 - 28)  SpO2: 97% (27 Oct 2017 23:54) (95% - 100%)    --------------------------------------------------------------------------------------    EXAM  NEUROLOGY  Exam: intubated, sedated    HEENT  Exam: Normocephalic, atraumatic, EOMI.      RESPIRATORY  Mechanical Ventilation: Mode: AC/ CMV (Assist Control/ Continuous Mandatory Ventilation), RR (machine): 15, TV (machine): 600, FiO2: 100, PEEP: 7, MAP: 13, PIP: 28    CARDIOVASCULAR  Exam: S1, S2.  Tachycardia    GI/NUTRITION  Exam: Abdomen soft, Non-distended. dressing with some strikethrough.  liquid dark bowel movment      VASCULAR  Exam: Extremities warm, pink, well-perfused.    MUSCULOSKELETAL  Exam: sedated    SKIN  Exam: Good skin turgor, no skin breakdown.    METABOLIC/FLUIDS/ELECTROLYTES  dextrose 10%. 1000 milliLiter(s) IV Continuous <Continuous>      HEMATOLOGIC  [x] VTE Prophylaxis:   Transfusions:	[] PRBC	[] Platelets		[] FFP	[] Cryoprecipitate    INFECTIOUS DISEASE  Antimicrobials/Immunologic Medications:    Day #      of     ***    Tubes/Lines/Drains  ***  [x] Peripheral IV  [x] Central Venous Line     	[] R	[] L	[] IJ	[] Fem	[] SC	Date Placed:   [] Arterial Line		[] R	[] L	[] Fem	[] Rad	[] Ax	Date Placed:   [] PICC		[] Midline		[] Mediport  [] Urinary Catheter		Date Placed:   [x] Necessity of urinary, arterial, and venous catheters discussed    LABS  --------------------------------------------------------------------------------------  no labs  --------------------------------------------------------------------------------------    OTHER LABORATORY:       ASSESSMENT:  61y Male with extensive necrotic bowel.  No resection during surgical procedure.  Pt is DNR, will not escalate care.    PLAN:     Neurologic: continue sedation with fentanyl, ativan as needed, comfort care  Respiratory: terminal extubation  Cardiovascular: continue pressors, will not escalate  Gastrointestinal/Nutrition: NPO  Renal/Genitourinary: mckeon, IV lock  Hematologic: none  Infectious Disease: none  Tubes/Lines/Drains: continue all current lines  Endocrine: none  Disposition: SICU    --------------------------------------------------------------------------------------  Code Status: DNR  Critical Care Diagnoses:  respiratory failure, ischemic/necrotic bowel, septic shock

## 2017-10-28 NOTE — PROGRESS NOTE ADULT - ATTENDING COMMENTS
61y Male with extensive necrotic bowel.  No resection during surgical procedure.  Pt is DNR, will not escalate care.    PLAN:     Neurologic: continue sedation with fentanyl, ativan as needed, comfort care  Respiratory: terminal extubation  Cardiovascular: continue pressors, will not escalate  Gastrointestinal/Nutrition: NPO  Renal/Genitourinary: mckeon, IV lock  Hematologic: none  Infectious Disease: none  Tubes/Lines/Drains: continue all current lines  Endocrine: none  Disposition: SICU  Spoke to wife and she agreed to comfort care measures.  --------------------------------------------------------------------------------------  Code Status: DNR  Critical Care Diagnoses:  respiratory failure, ischemic/necrotic bowel, septic shock    The patient is a critical care patient with hemodynamic and metabolic instability in SICU.  I have personally interviewed and examined this patient, reviewed labs and x-rays, discussed with other consultants, House staff and PA's.  I spent 66    minutes  in total providing critical care for the diagnoses, assessment and plan above.  These diagnoses are unrelated to the surgical procedure noted above.  I met with family 33    min to get further history and make care decisions for this patient who is unable to participate due to altered mental status.  Time involved in performance of separately billable procedures was not counted toward my critical care time.  There is no overlap.

## 2017-10-28 NOTE — PROGRESS NOTE ADULT - ASSESSMENT
61y Male with extensive necrotic bowel.  No resection during surgical procedure.  Pt is DNR, will not escalate care.    Plan:  - Comfort measures only  - Supportive care per SICU
61yM w/ critical mesenteric ischemia    - pt now w/ acute abdomen  - will mobilize emergent OR team for exploratory laparotomy, possible mesenteric bypass  - will discuss w/ vascular team  - please page [phone] w/ any questions
A: 61M seen s/p ex-lap and visualization of necrotic small bowel/colon, in addition to a perforated appendix leaking purulent fluid.    P:  - No further escalation of care  - NPO  - Keep lines in for monitoring  - Care per SICU team
62YO male with history of chronic mesenteric ischemia s/p exploratory laparotomy, POD #0 in SICU, requiring pressors and mechanical ventilation for septic shock and respiratory failure     - operative findings - ischemic and necrotic bowel with <30% of bowel viable - poor prognosis   - family does not wish to escalate care   - SICU care appreciated   - NPO/IVF      TEAM B [id number]

## 2017-10-28 NOTE — PROGRESS NOTE ADULT - SUBJECTIVE AND OBJECTIVE BOX
B Team Surgery Progress Note - pager [phone]    Patient is a 61y old  Male who presents with a chief complaint of Vascular surgery (26 Oct 2017 19:23)      SUBJECTIVE: Patient seen and examined on B team morning rounds.     Interval/Overnight Events:    OR - extensive necrotic bowel, purulence, bile.  pt closed without intervention.    OBJECTIVE:     ** Physical Exam **  Deferred physical exam. Patient is comfort care.     ** Vital signs / I&O's **    Vital Signs Last 24 Hrs  T(C): 36.1 (28 Oct 2017 08:08), Max: 36.8 (27 Oct 2017 19:30)  T(F): 97 (28 Oct 2017 08:08), Max: 98.3 (27 Oct 2017 19:30)  HR: 88 (28 Oct 2017 09:24) (88 - 122)  BP: 93/69 (27 Oct 2017 17:45) (90/70 - 109/79)  BP(mean): 75 (27 Oct 2017 17:45) (75 - 86)  RR: 8 (28 Oct 2017 09:26) (8 - 16)  SpO2: 59% (28 Oct 2017 09:26) (59% - 100%)      27 Oct 2017 07:01  -  28 Oct 2017 07:00  --------------------------------------------------------  IN:    dextrose 10%: 2800 mL    dextrose 10% + sodium chloride 0.9%: 200 mL    fentaNYL  Infusion: 2.3 mL    fentaNYL  Infusion: 120.9 mL    norepinephrine Infusion: 111.8 mL    norepinephrine Infusion: 17.7 mL    vasopressin Infusion: 1.2 mL    vasopressin Infusion: 31.2 mL  Total IN: 3285.1 mL    OUT:    Chest Tube: 35 mL    Indwelling Catheter - Urethral: 1295 mL    Nasoenteral Tube: 120 mL  Total OUT: 1450 mL    Total NET: 1835.1 mL      28 Oct 2017 07:01  -  28 Oct 2017 11:32  --------------------------------------------------------  IN:    dextrose 10%: 200 mL    fentaNYL  Infusion: 13.9 mL  Total IN: 213.9 mL    OUT:    Indwelling Catheter - Urethral: 225 mL    Nasoenteral Tube: 20 mL  Total OUT: 245 mL    Total NET: -31.1 mL            ** Labs **                          7.0    7.17  )-----------( 110      ( 27 Oct 2017 11:30 )             21.1     27 Oct 2017 11:30    134    |  98     |  44     ----------------------------<  108    5.0     |  15     |  0.55     Ca    7.2        27 Oct 2017 11:30  Phos  4.6       26 Oct 2017 19:10  Mg     2.0       26 Oct 2017 19:10    TPro  3.5    /  Alb  1.9    /  TBili  0.9    /  DBili  x      /  AST  3668   /  ALT  1598   /  AlkPhos  66     27 Oct 2017 11:30    PT/INR - ( 27 Oct 2017 11:30 )   PT: 19.8 SEC;   INR: 1.75          PTT - ( 27 Oct 2017 11:30 )  PTT:50.2 SEC  CAPILLARY BLOOD GLUCOSE        CARDIAC MARKERS ( 27 Oct 2017 00:10 )  x     / 0.31 ng/mL / 135 u/L / 6.87 ng/mL / x          LIVER FUNCTIONS - ( 27 Oct 2017 11:30 )  Alb: 1.9 g/dL / Pro: 3.5 g/dL / ALK PHOS: 66 u/L / ALT: 1598 u/L / AST: 3668 u/L / GGT: x               MEDICATIONS  (STANDING):  fentaNYL   Infusion 4 MICROgram(s)/kG/Hr (18.52 mL/Hr) IV Continuous <Continuous>    MEDICATIONS  (PRN):  LORazepam   Injectable 2 milliGRAM(s) IV Push every 4 hours PRN Agitation

## 2017-10-28 NOTE — CHART NOTE - NSCHARTNOTEFT_GEN_A_CORE
Notified by nursing at 10:06 am that pt has , heart rate and blood pressure decreased over the last 20 minutes until asystole and no blood pressure recorded on telemetry monitor. Pt for comfort measures only at time of death, terminal extubation occurred at 9:17am.    Attended bedside with resident and attending Dr Lopez with family present.    PE: no signs of life, no respiratory effort  pupils fixed and dilated  no heart or lung sounds on auscultation of the chest    time of death 10:06am, pt examined 10:10am. RIP.    family has requested an autopsy. Medical examiner will be contacted, if not accepted for review by the ME then pathology will be contacted for autopsy.    Dr Mickey WHITINGBS

## 2017-10-28 NOTE — DISCHARGE NOTE FOR THE EXPIRED PATIENT - HOSPITAL COURSE
61M transferred from [facility] for vascular surgery. Patient was initially admitted for diarrhea and abdominal pain, a week ago at [facility]. On admission, patient was hyponatremic, hypokalemic and hypophospatemic, with varable blood glucoses ranging from 39 - 300. EGD was significant for gastritis, but no mass or malignancy. Colonoscopy done earlier this year (around March according to patient's spouse) was insignificant. He was empirically started on fluconazole for possible esophageal candidiasis but fungal stains were negative on biopsy and flucoanozole was discontinued. HIV negative. 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 Patient was transferred to [facility] today for surgery. Of note, patient had a right sided pneumothorax on 10/24, chest tube was placed on 10/25, and there is a plan to remove chest tube on 10/27. Patient has been getting 1g NaCl BID for hyponatremia.   He became acutely ill with a worsening abdominal exam. He was taken to the operating room where dead gut was found from 30 cm of small bowel distal from the stomach to the rectum. There were many perforations seen and gross spillage of pus. At that point the patient's gut was deemed not viable and his condition not compatible with life. The abdomen was closed and the patient went to SICU. Comfort care measures were taken and he was extubated in the morning with his family at bedside. Approximately 50 minutes later he  at 10:10 AM. 61M transferred from [facility] for vascular surgery. Patient was initially admitted for diarrhea and abdominal pain, a week ago at [facility]. On admission, patient was hyponatremic, hypokalemic and hypophospatemic, with varable blood glucoses ranging from 39 - 300. EGD was significant for gastritis, but no mass or malignancy. Colonoscopy done earlier this year (around March according to patient's spouse) was insignificant. He was empirically started on fluconazole for possible esophageal candidiasis but fungal stains were negative on biopsy and flucoanozole was discontinued. HIV negative. 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 Patient was transferred to [facility] today for surgery. Of note, patient had a right sided pneumothorax on 10/24, chest tube was placed on 10/25, and there is a plan to remove chest tube on 10/27. Patient has been getting 1g NaCl BID for hyponatremia.   He became acutely ill with a worsening abdominal exam. He was taken to the operating room where dead gut was found from 30 cm of small bowel distal from the stomach to the rectum. There were many perforations seen and gross spillage of pus. At that point the patient's gut was deemed not viable and his condition not compatible with life. The abdomen was closed and the patient went to SICU. Comfort care measures were taken and he was extubated in the morning with his family at bedside. Approximately 50 minutes later he  at 10:10 AM.   Agree.

## 2017-10-28 NOTE — PROGRESS NOTE ADULT - SUBJECTIVE AND OBJECTIVE BOX
ANESTHESIA POSTOP CHECK    61y Male POSTOP DAY 1 S/P EX lap    Vital Signs Last 24 Hrs  T(C): 36.1 (28 Oct 2017 08:08), Max: 36.8 (27 Oct 2017 19:30)  T(F): 97 (28 Oct 2017 08:08), Max: 98.3 (27 Oct 2017 19:30)  HR: 88 (28 Oct 2017 09:24) (88 - 122)  BP: 93/69 (27 Oct 2017 17:45) (90/70 - 109/79)  BP(mean): 75 (27 Oct 2017 17:45) (75 - 86)  RR: 8 (28 Oct 2017 09:26) (8 - 28)  SpO2: 59% (28 Oct 2017 09:26) (59% - 100%)  I&O's Summary    27 Oct 2017 07:01  -  28 Oct 2017 07:00  --------------------------------------------------------  IN: 3285.1 mL / OUT: 1450 mL / NET: 1835.1 mL    28 Oct 2017 07:01  -  28 Oct 2017 10:01  --------------------------------------------------------  IN: 213.9 mL / OUT: 150 mL / NET: 63.9 mL        [x ] NO APPARENT ANESTHESIA COMPLICATIONS      Comments: Per SICU he is being terminally extubated

## 2017-10-29 LAB
-  AMIKACIN: SIGNIFICANT CHANGE UP
-  AMPICILLIN/SULBACTAM: SIGNIFICANT CHANGE UP
-  AMPICILLIN: SIGNIFICANT CHANGE UP
-  AZTREONAM: SIGNIFICANT CHANGE UP
-  CEFAZOLIN: SIGNIFICANT CHANGE UP
-  CEFEPIME: SIGNIFICANT CHANGE UP
-  CEFOXITIN: SIGNIFICANT CHANGE UP
-  CEFTAZIDIME: SIGNIFICANT CHANGE UP
-  CEFTRIAXONE: SIGNIFICANT CHANGE UP
-  CIPROFLOXACIN: SIGNIFICANT CHANGE UP
-  ERTAPENEM: SIGNIFICANT CHANGE UP
-  GENTAMICIN: SIGNIFICANT CHANGE UP
-  IMIPENEM: SIGNIFICANT CHANGE UP
-  LEVOFLOXACIN: SIGNIFICANT CHANGE UP
-  MEROPENEM: SIGNIFICANT CHANGE UP
-  PIPERACILLIN/TAZOBACTAM: SIGNIFICANT CHANGE UP
-  TIGECYCLINE: SIGNIFICANT CHANGE UP
-  TOBRAMYCIN: SIGNIFICANT CHANGE UP
-  TRIMETHOPRIM/SULFAMETHOXAZOLE: SIGNIFICANT CHANGE UP
CULTURE - SURGICAL SITE: SIGNIFICANT CHANGE UP
GRAM STN WND: SIGNIFICANT CHANGE UP
METHOD TYPE: SIGNIFICANT CHANGE UP
ORGANISM # SPEC MICROSCOPIC CNT: SIGNIFICANT CHANGE UP
ORGANISM # SPEC MICROSCOPIC CNT: SIGNIFICANT CHANGE UP

## 2017-10-30 LAB
CALPROTECTIN STL-MCNT: 472 UG/G — HIGH (ref 0–120)
FAT STL QN: NORMAL — SIGNIFICANT CHANGE UP
FAT STL QN: NORMAL — SIGNIFICANT CHANGE UP
SPECIMEN SOURCE: SIGNIFICANT CHANGE UP

## 2017-11-02 DIAGNOSIS — I10 ESSENTIAL (PRIMARY) HYPERTENSION: ICD-10-CM

## 2017-11-03 LAB — SPECIMEN SOURCE: SIGNIFICANT CHANGE UP

## 2017-11-16 LAB — STRONGYLOIDES AB SER-ACNC: NEGATIVE — SIGNIFICANT CHANGE UP

## 2017-11-27 LAB — FUNGUS SPEC QL CULT: SIGNIFICANT CHANGE UP

## 2017-12-08 LAB — ACID FAST STN SPEC: SIGNIFICANT CHANGE UP

## 2018-09-21 NOTE — PATIENT PROFILE ADULT. - PATIENT REPRESENTATIVE NAME
Patient discharged with v/s stable. Written and verbal after care instructions 
given and explained. 

Patient verbalized understanding. Ambulance Transport with to nursing home. All 
questions addressed prior to discharge. Advised to follow up with PMD. Leyla Hero

## 2020-03-10 NOTE — PROGRESS NOTE ADULT - PROBLEM SELECTOR PLAN 1
Cachectic male with Low BMI  Likely from GI malignancy as associated with nausea, vomiting, weight loss and loss of appetite.   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 at - no results faxed  - Called [facility] at [phone] and requested to fax medical records- 10/20 none Cachectic male with Low BMI  Likely from GI malignancy as associated with nausea, vomiting, weight loss and loss of appetite.   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   C/w Dextrose + NS+ KCL  Unable to reach wife for medication history, HIPPA form faxed to [facility] at - no results faxed  - Called [facility] at [phone] and requested to fax medical records- 10/20 Cachectic male with Low BMI  Improving apatite   Likely from GI malignancy vs mood related behaviour   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   C/w Dextrose + NS+ KCL  Unable to reach wife for medication history, HIPPA form faxed to [facility] at - no results faxed  - Called [facility] at [phone] and requested to fax medical records- 10/20 Cachectic male with Low BMI  Likely from GI malignancy vs infectious    

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
## 2021-06-10 NOTE — PROGRESS NOTE ADULT - ASSESSMENT
Pt is aware.    61 year old male from home with no significant PMH presented with chronic abdominal pain, nausea, vomiting and diarrhea for 7 months since Feb 2017. Currently being managed for hypoglycemia. Improving apatite as pt is now eating chocolates. As per Nurse, his intake has improved. Diarrhea is still persistent. Finger sticks are more elevated now.    Pt has a cousin in [place] who is doctor (Dr. Bala Cabrera [phone]). Tried to reach him today but was unsuccessful. Can retry during the weekdays.

## 2021-06-16 NOTE — ED ADULT NURSE NOTE - NS ED NURSE REPORT GIVEN TO FT
[de-identified] : 89 
Barbara SAHU
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
## 2021-09-15 NOTE — DISCHARGE NOTE ADULT - PRINCIPAL DIAGNOSIS
ADULT ANNUAL PHYSICAL  [facility]    NAME: Dane Seymour  AGE: 31 y o  SEX: female  : 1991     DATE: 9/15/2021     Assessment and Plan:     Problem List Items Addressed This Visit        Respiratory    Mild persistent asthma without complication       Symptoms now occurring daily with shortness of breath  Patient admits to relief with the use of albuterol  Given daily symptoms are mild, we will start patient on Advair as per new JENNIFER recommendations to use PRN  -  Continue to monitor and consider daily Advair use and continue albuterol p r n  Mixon Spruce Relevant Medications    fluticasone-salmeterol (Advair) 250-50 mcg/dose inhaler       Other    Iron deficiency anemia     History of iron deficiency anemia in the past   Patient states that she recently found out she had fibroids and does have more frequent vaginal bleeding  Follows with OBGYN   -  Repeat CBC  Relevant Orders    CBC and differential    Class 1 obesity due to excess calories without serious comorbidity with body mass index (BMI) of 32 0 to 32 9 in adult     Patient complains of weight gain  BMI Counseling: Body mass index is 32 86 kg/m²  The BMI is above normal  Nutrition recommendations include reducing portion sizes, 3-5 servings of fruits/vegetables daily, reducing fast food intake, consuming healthier snacks and increasing intake of lean protein  Exercise recommendations include moderate aerobic physical activity for 150 minutes/week and exercising 3-5 times per week  - TSH ordered  - Hemoglobin A1c           Relevant Orders    HEMOGLOBIN A1C W/ EAG ESTIMATION    TSH, 3rd generation with Free T4 reflex      Other Visit Diagnoses     Annual physical exam    -  Primary    Missed period        Relevant Orders    POCT urine HCG (Completed)    Need for hepatitis C screening test        Relevant Orders    Hepatitis C Antibody ([facility])    Screening for HIV (human immunodeficiency virus)        Relevant Orders    HIV 1/2 Antigen/Antibody (4th Generation) w Reflex [facility]          Immunizations and preventive care screenings were discussed with patient today  Appropriate education was printed on patient's after visit summary  Counseling:  Alcohol/drug use: discussed moderation in alcohol intake, the recommendations for healthy alcohol use, and avoidance of illicit drug use  Dental Health: discussed importance of regular tooth brushing, flossing, and dental visits  Injury prevention: discussed safety/seat belts, safety helmets, smoke detectors, carbon dioxide detectors, and smoking near bedding or upholstery  Sexual health: discussed sexually transmitted diseases, partner selection, use of condoms, avoidance of unintended pregnancy, and contraceptive alternatives  · Exercise: the importance of regular exercise/physical activity was discussed  Recommend exercise 3-5 times per week for at least 30 minutes  Return in about 3 months (around 12/15/2021) for f/u weight gain  Chief Complaint:     Chief Complaint   Patient presents with    Physical Exam     32 y/o      History of Present Illness:     Adult Annual Physical   Patient here for a comprehensive physical exam  The patient reports problems - Unprotected sex - requests pregnancy test       Diet and Physical Activity  · Diet/Nutrition: well balanced diet and consuming 3-5 servings of fruits/vegetables daily  · Exercise: walking, 5-7 times a week on average and 1-2 hours on average  Depression Screening  PHQ-9 Depression Screening    PHQ-9:   Frequency of the following problems over the past two weeks:           General Health  · Sleep: gets 4-6 hours of sleep on average and unrefreshing sleep  · Hearing: decreased - left  · Vision: goes for regular eye exams, most recent eye exam <1 year ago and wears glasses     · Dental: regular dental visits, brushes teeth three times daily and flosses teeth occasionally  /GYN Health  · Last menstrual period: Unsure  · Contraceptive method: Stopped birth control pills recently     · History of STDs?: no      Review of Systems:     Review of Systems   Constitutional: Negative for activity change, appetite change and fever  HENT: Negative for congestion and sore throat  Eyes: Negative for visual disturbance  Respiratory: Negative for cough, shortness of breath and wheezing  Cardiovascular: Negative for chest pain and palpitations  Gastrointestinal: Negative for abdominal distention, abdominal pain, constipation, diarrhea and vomiting  Musculoskeletal: Negative for arthralgias  Skin: Negative for rash  Neurological: Positive for weakness (Left-sided)  Negative for dizziness, light-headedness and headaches  Past Medical History:     Past Medical History:   Diagnosis Date    Anemia     Asthma     Heart murmur       Past Surgical History:     No past surgical history on file  Social History:     Social History     Socioeconomic History    Marital status: Single     Spouse name: None    Number of children: None    Years of education: None    Highest education level: None   Occupational History    None   Tobacco Use    Smoking status: Never Smoker    Smokeless tobacco: Never Used   Vaping Use    Vaping Use: Never used   Substance and Sexual Activity    Alcohol use: Yes     Comment: rarely; about 3x a year    Drug use: Never    Sexual activity: None   Other Topics Concern    None   Social History Narrative    None     Social Determinants of Health     Financial Resource Strain: Medium Risk    Difficulty of Paying Living Expenses: Somewhat hard   Food Insecurity:     Worried About Running Out of Food in the Last Year:     Ran Out of Food in the Last Year:    Transportation Needs:     Lack of Transportation (Medical):      Lack of Transportation (Non-Medical):    Physical Activity:     Days of Exercise per Week:  Minutes of Exercise per Session:    Stress:     Feeling of Stress :    Social Connections:     Frequency of Communication with Friends and Family:     Frequency of Social Gatherings with Friends and Family:     Attends Sabianism Services:     Active Member of Clubs or Organizations:     Attends Club or Organization Meetings:     Marital Status:    Intimate Partner Violence:     Fear of Current or Ex-Partner:     Emotionally Abused:     Physically Abused:     Sexually Abused:       Family History:     No family history on file  Current Medications:     Current Outpatient Medications   Medication Sig Dispense Refill    albuterol (Ventolin HFA) 90 mcg/act inhaler Inhale 2 puffs every 4 (four) hours as needed for wheezing 2 Inhaler 3    amitriptyline (ELAVIL) 25 mg tablet Take 2 tablets (50 mg total) by mouth daily at bedtime 60 tablet 5    fluticasone (FLONASE) 50 mcg/act nasal spray 1 spray into each nostril daily 16 g 2    loratadine (CLARITIN) 10 mg tablet Take 1 tablet (10 mg total) by mouth daily 30 tablet 1    methocarbamol (ROBAXIN) 750 mg tablet Take 750 mg by mouth 3 (three) times a day as needed      montelukast (SINGULAIR) 10 mg tablet Take 1 tablet (10 mg total) by mouth daily at bedtime 90 tablet 3    Myfembree 40-1-0 5 MG TABS Take 1 tablet by mouth daily      oxybutynin (DITROPAN XL) 15 MG 24 hr tablet Take 15 mg by mouth daily      pentosan polysulfate (ELMIRON) 100 mg capsule 3 (three) times a day      desogestrel-ethinyl estradiol (APRI) 0 15-30 MG-MCG per tablet Take 1 tablet by mouth daily      dexamethasone (DECADRON) 2 mg tablet Take 1 tablet (2 mg total) by mouth daily with breakfast (Patient not taking: Reported on 8/17/2021) 5 tablet 1    diclofenac sodium (VOLTAREN) 1 % Apply 2 g topically 4 (four) times a day 100 g 0    fluticasone-salmeterol (Advair) 250-50 mcg/dose inhaler Inhale 1 puff 2 (two) times a day Rinse mouth after use   60 blister 1    ibuprofen (MOTRIN) 800 mg tablet Take 1 tablet (800 mg total) by mouth every 8 (eight) hours as needed for moderate pain 21 tablet 0    oxymetazoline (AFRIN) 0 05 % nasal spray 3 sprays by Each Nare route 2 (two) times a day for 3 days 1 8 mL 0    prochlorperazine (COMPAZINE) 10 mg tablet Take 1 tablet (10 mg total) by mouth every 8 (eight) hours as needed for nausea (migraine) Take with Decadron 14 tablet 0     No current facility-administered medications for this visit  Allergies: Allergies   Allergen Reactions    Eggs Or Egg-Derived Products - Food Allergy Anaphylaxis    Peanut Oil - Food Allergy Anaphylaxis    Baking Soda-Fluoride [Sodium Fluoride] Swelling    Benzocaine Swelling    Aloe Vera Hives, Itching and Rash      Physical Exam:     /74 (BP Location: Left arm, Patient Position: Sitting, Cuff Size: Standard)   Pulse 96   Temp (!) 97 3 °F (36 3 °C) (Temporal)   Resp 16   Ht 5' 6 5" (1 689 m)   Wt 93 8 kg (206 lb 11 2 oz)   SpO2 97%   BMI 32 86 kg/m²     Physical Exam  Vitals and nursing note reviewed  Constitutional:       General: She is not in acute distress  Appearance: She is well-developed  HENT:      Head: Normocephalic and atraumatic  Right Ear: Tympanic membrane, ear canal and external ear normal  There is no impacted cerumen  Left Ear: Tympanic membrane, ear canal and external ear normal  There is no impacted cerumen  Nose: Nose normal  No congestion or rhinorrhea  Mouth/Throat:      Mouth: Mucous membranes are moist       Pharynx: Oropharynx is clear  No oropharyngeal exudate or posterior oropharyngeal erythema  Eyes:      General: No scleral icterus  Right eye: No discharge  Left eye: No discharge  Extraocular Movements: Extraocular movements intact  Conjunctiva/sclera: Conjunctivae normal    Cardiovascular:      Rate and Rhythm: Normal rate and regular rhythm  Heart sounds: No murmur heard       Pulmonary:      Effort: Pulmonary effort is normal  No respiratory distress  Breath sounds: Normal breath sounds  Abdominal:      Palpations: Abdomen is soft  Tenderness: There is no abdominal tenderness  Musculoskeletal:         General: No swelling  Normal range of motion  Cervical back: Neck supple  Right lower leg: No edema  Left lower leg: No edema  Comments: Asymmetry of the scapula with left scapula more lateral compared to right scapula  Skin:     General: Skin is warm and dry  Neurological:      Mental Status: She is alert  Cranial Nerves: No cranial nerve deficit  Motor: Weakness present  Comments: Weakness (4/5 strength in LUE, 5/5 strength in LLE )   Psychiatric:         Mood and Affect: Mood normal          Behavior: Behavior normal          Thought Content:  Thought content normal          Judgment: Judgment normal           MD Erika SkinnerGalatiansDavid Ville [id number] Mesenteric ischemia

## 2022-03-09 NOTE — PROGRESS NOTE ADULT - ATTENDING COMMENTS
Surgery Bariatric Surgery Patient seen and examined this morning 11.15 AM; Agree with PGY1 A/P above with editing as needed. d/w PGY1 Dr. Garrison    Patient with severe Cachexia and Malnutrition due to poor oral intake due to epigastric pain and early satiety. He had BM. no significant diarrhea at present. patient not drinking Ensure Enlive as ordered.     Spoke with pathologist earlier this afternoon no evidence of obvious malignancy on Biopsy specimen from EGD. EGD did show possible candida esophagitis    P/E: As above  Severe Cachexia  CVs: S1S2 present  Resp: BLAE+  Extr: No edema or calf tenderness b/l lE    Labs: As above  K normalized; WBC slightly elevated; 15K; Low phos    D/D:  Severe Protein calorie Malnutrition with cachexia concerning for occult upper GI Malignancy   Candida Esophagitis  Electrolyte imbalance; Hypophosphatemia  Leucocytosis possibly reactive    Plan:  Await MRI Brain and Abdomen prior to further course of action  Patient may need Endoscopic USG (EUS)  If poor oral intake persist, will d./w Patient for Feeding tube but will avoid and encouraged patient to take frequent small meals and protein supplement which I requested dietitian to follow and may be change flavor. patient like vanilla flavor.   Will d/w Cousin Physician once MRI available.   Low P replaced parenterally.      Discussed with patient  Discussed with PGY 1/2 Dr. Carvalho  Discussed with LUIS ALBERTO Villeda

## 2022-04-08 NOTE — H&P ADULT - HISTORY OF PRESENT ILLNESS
61 year old male from home with no significant PMH presented with chronic abdominal pain, nausea, vomiting and diarrhea. Patient stated that he started having pain in his stomach for 7 months with no relationship to food. He had loss of appetite and loss of weight for 70 lb over this 7 months. The pain is also associated with nausea, vomiting and diarrhea. Patient stated that sometimes he has diarrhea about 8 times per days. he went to [facility] and had EGD and colonoscopy and was tole he had inflammation in his gastrointestinal tract.  Also patient was evaluated in September at [facility]. Patient also stated that he has some obstruction causing him constipation and sometime he had dark bowel movement. Patient denied having any other symptom such as chest pain, cough, palpitation or any urinary habit change. Patient was originally from [facility] but denied recent travel or diet change. Patient is smoker and use alcohol before with risk for malignancy. no

## 2022-04-11 NOTE — ED ADULT NURSE NOTE - INTEGUMENTARY WDL
Color consistent with ethnicity/race, warm, dry intact, resilient. Detail Level: Zone Render In Strict Bullet Format?: No Initiate Treatment: tretinoin 0.025 % topical cream QHS\\nSig: apply to clean dry face every other night for two weeks. then apply nightly if tolerated\\n\\nbenzoyl peroxide 5 % topical cleanser \\nSig: Wash face (chest and back if affected) every morning\\n\\nclindamycin 1 % lotion \\nSig: apply to clean face every morning (can also apply to back and chest if affected)\\n\\ndoxycycline hyclate 100 mg capsule BID\\nSig: Take one pill twice daily with a full glass of water, do not lie down until 1 hour after taking.

## 2022-07-26 NOTE — DISCHARGE NOTE ADULT - HOSPITAL COURSE
patient is a 61 year old male from home with no significant PMH presented with chronic abdominal pain, nausea, vomiting and diarrhea for 7 months since Feb 2017. On admission, pt was hyponatremic, hypokalemic and hypophosphatemia. His blood sugar has been all over the place. Pt continues has diarrhea and persistent abd pain.  Extensive workup has been done, including EGD, HIV, MRI negative.  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 Patient will be transferred to [facility] for vascular surgery. Patient has right side pneumothorax on 10/24, chest tube was placed 10/25, and plan Amnisure positive

## 2023-01-08 NOTE — PROGRESS NOTE ADULT - SUBJECTIVE AND OBJECTIVE BOX
[facility] NEPHROLOGY- PROGRESS NOTE    60yo M with no significant PMH p/w intermittent chronic diarrhea 10-18 times / day for 1year, +weight loss 70 lbs (1 yr & 3 months), with nausea/ vomiting and decreased PO intake. Pt a/w FTT, hypoglycemia and hyponatremia. s/p CT abd/pelvis wnl.   Pt found to be hyponatremic 121 in the ER. s/p 2L NS bolus and serum sodium increased to 130. Renal consulted for hyponatremia. s/p EGD with candidal esophagitis with abnormal small bowel mucosa s/p biopsy- pathology pending     Pt c/o epigastric pain when eating.   Hospital Medications: Medications reviewed.  REVIEW OF SYSTEMS: No h/a, cp, sob, dysuria. Denies any vomiting. pt c/o multiple episodes of diarrhea and epigastric pain after eating.     VITALS:  T(F): 98.1 (10-24-17 @ 08:35), Max: 98.1 (10-24-17 @ 08:35)  HR: 88 (10-24-17 @ 08:35)  BP: 93/69 (10-24-17 @ 08:35)  RR: 17 (10-24-17 @ 08:35)  SpO2: 100% (10-24-17 @ 08:35)  Wt(kg): --    10-23 @ 07:01  -  10-24 @ 07:00  --------------------------------------------------------  IN: 0 mL / OUT: 400 mL / NET: -400 mL      PHYSICAL EXAM:  Gen: Severely cachectic  HEENT: temporal wasting  Cards: RRR, +S1/S2, no M/G/R  Resp: CTA B/L  GI: soft, NT ND  : no mckeon  Extremities: no LE edema B/L  MSK: , severe muscle wasting    LABS:  10-24    130<L>  |  95<L>  |  7   ----------------------------<  61<L>  4.3   |  25  |  <0.20<L>    Ca    8.0<L>      24 Oct 2017 07:01  Phos  2.0     10-24  Mg     1.7     10-24    TPro  5.0<L>  /  Alb  2.7<L>  /  TBili      /  DBili      /  AST      /  ALT      /  AlkPhos      10-23    Creatinine Trend: <0.20 <--, 0.29 <--, 0.25 <--, 0.39 <--, 0.27 <--, 0.26 <--, 0.23 <--, 0.37 <--, 0.29 <--, 0.30 <--, 0.40 <--, 0.30 <--, <0.20 <--, 0.30 <--                        13.0   15.3  )-----------( 139      ( 23 Oct 2017 07:14 )             38.5     Urine Studies:  Urinalysis Basic - ( 22 Oct 2017 11:47 )    Color: Yellow / Appearance: Clear / S.010 / pH:   Gluc:  / Ketone: Negative  / Bili: Negative / Urobili: Negative   Blood:  / Protein: Negative / Nitrite: Negative   Leuk Esterase: Negative / RBC:  / WBC    Sq Epi:  / Non Sq Epi:  / Bacteria:       Osmolality, Random Urine: 321 mos/kg (10-19 @ 22:23)  Chloride, Random Urine: 91 mmol/L (10-19 @ 22:23)  Creatinine, Random Urine: <13 mg/dL (10-19 @ 22:23)  Sodium, Random Urine: 70 mmol/L (10-19 @ 22:23)  Osmolality, Random Urine: 207 mos/kg (10-19 @ 09:56)  Sodium, Random Urine: 65 mmol/L (10-19 @ 04:17)  Chloride, Random Urine: 56 mmol/L (10-19 @ 04:17)  Creatinine, Random Urine: <13 mg/dL (10-19 @ 04:17) None Done

## 2023-07-10 NOTE — ED PROVIDER NOTE - CPE EDP NEURO NORM
Noted. Weight down.   Also labs reviewed. Sodium and potassium are normal. BNP normal. Kidney function up just slightly.    Would continue current plan  Recheck labs one month.     Thanks     normal...

## 2023-08-19 NOTE — DIETITIAN INITIAL EVALUATION ADULT. - PROBLEM SELECTOR PLAN 2
Male
Likely from chronic diarrhea, concern for malignancy.   EGD colonoscopy tomorrow with Dr Holden as above.  Concern for TB as patient from [facility], f/u [facility] and CT chest without contrast.

## 2024-02-23 NOTE — ED ADULT NURSE REASSESSMENT NOTE - RESPONSE TO
Instructions: This plan will send the code FBSE to the PM system.  DO NOT or CHANGE the price.
response to care/treatments:
Price (Do Not Change): 0.00
Detail Level: Simple